# Patient Record
Sex: FEMALE | Race: ASIAN | NOT HISPANIC OR LATINO | URBAN - METROPOLITAN AREA
[De-identification: names, ages, dates, MRNs, and addresses within clinical notes are randomized per-mention and may not be internally consistent; named-entity substitution may affect disease eponyms.]

---

## 2018-05-06 ENCOUNTER — EMERGENCY (EMERGENCY)
Facility: HOSPITAL | Age: 38
LOS: 1 days | Discharge: HOME | End: 2018-05-06
Attending: EMERGENCY MEDICINE | Admitting: EMERGENCY MEDICINE
Payer: COMMERCIAL

## 2018-05-06 VITALS
SYSTOLIC BLOOD PRESSURE: 130 MMHG | RESPIRATION RATE: 18 BRPM | DIASTOLIC BLOOD PRESSURE: 59 MMHG | TEMPERATURE: 98 F | OXYGEN SATURATION: 100 % | HEART RATE: 69 BPM

## 2018-05-06 PROCEDURE — 99283 EMERGENCY DEPT VISIT LOW MDM: CPT

## 2018-05-06 PROCEDURE — 73090 X-RAY EXAM OF FOREARM: CPT | Mod: 26,RT

## 2018-05-06 RX ORDER — KETOROLAC TROMETHAMINE 30 MG/ML
30 SYRINGE (ML) INJECTION ONCE
Qty: 0 | Refills: 0 | Status: DISCONTINUED | OUTPATIENT
Start: 2018-05-06 | End: 2018-05-06

## 2018-05-06 RX ADMIN — Medication 30 MILLIGRAM(S): at 18:50

## 2018-05-06 NOTE — ED PROVIDER NOTE - OBJECTIVE STATEMENT
37 yo with mechanical fall at home onto her R FA.  No LOC no other injuries.  Pain to the lateral R mid FA.  No deformity and good distal PMS.

## 2018-11-21 ENCOUNTER — EMERGENCY (EMERGENCY)
Facility: HOSPITAL | Age: 38
LOS: 1 days | Discharge: HOME | End: 2018-11-21
Attending: EMERGENCY MEDICINE | Admitting: EMERGENCY MEDICINE
Payer: COMMERCIAL

## 2018-11-21 VITALS
DIASTOLIC BLOOD PRESSURE: 92 MMHG | TEMPERATURE: 98 F | HEART RATE: 80 BPM | OXYGEN SATURATION: 97 % | RESPIRATION RATE: 17 BRPM | SYSTOLIC BLOOD PRESSURE: 146 MMHG

## 2018-11-21 VITALS
DIASTOLIC BLOOD PRESSURE: 85 MMHG | OXYGEN SATURATION: 100 % | SYSTOLIC BLOOD PRESSURE: 147 MMHG | RESPIRATION RATE: 16 BRPM | HEART RATE: 68 BPM

## 2018-11-21 PROCEDURE — 99284 EMERGENCY DEPT VISIT MOD MDM: CPT | Mod: 25

## 2018-11-21 RX ORDER — LIDOCAINE 4 G/100G
1 CREAM TOPICAL ONCE
Qty: 0 | Refills: 0 | Status: COMPLETED | OUTPATIENT
Start: 2018-11-21 | End: 2018-11-21

## 2018-11-21 RX ORDER — DIAZEPAM 5 MG
2 TABLET ORAL ONCE
Qty: 0 | Refills: 0 | Status: DISCONTINUED | OUTPATIENT
Start: 2018-11-21 | End: 2018-11-21

## 2018-11-21 RX ORDER — OXYCODONE AND ACETAMINOPHEN 5; 325 MG/1; MG/1
1 TABLET ORAL ONCE
Qty: 0 | Refills: 0 | Status: DISCONTINUED | OUTPATIENT
Start: 2018-11-21 | End: 2018-11-21

## 2018-11-21 RX ORDER — MORPHINE SULFATE 50 MG/1
4 CAPSULE, EXTENDED RELEASE ORAL ONCE
Qty: 0 | Refills: 0 | Status: DISCONTINUED | OUTPATIENT
Start: 2018-11-21 | End: 2018-11-21

## 2018-11-21 RX ORDER — KETOROLAC TROMETHAMINE 30 MG/ML
10 SYRINGE (ML) INJECTION ONCE
Qty: 0 | Refills: 0 | Status: DISCONTINUED | OUTPATIENT
Start: 2018-11-21 | End: 2018-11-21

## 2018-11-21 RX ADMIN — Medication 2 MILLIGRAM(S): at 07:25

## 2018-11-21 RX ADMIN — LIDOCAINE 1 PATCH: 4 CREAM TOPICAL at 07:21

## 2018-11-21 RX ADMIN — Medication 10 MILLIGRAM(S): at 07:19

## 2018-11-21 RX ADMIN — Medication 125 MILLIGRAM(S): at 08:59

## 2018-11-21 RX ADMIN — MORPHINE SULFATE 4 MILLIGRAM(S): 50 CAPSULE, EXTENDED RELEASE ORAL at 08:59

## 2018-11-21 NOTE — ED PROVIDER NOTE - MEDICAL DECISION MAKING DETAILS
37 yo F pt w/PMHx HLD presenting w/lower back pain after mechanical injury; found to have bulging disc. Will provide with pain control for now.

## 2018-11-21 NOTE — ED ADULT NURSE NOTE - NSIMPLEMENTINTERV_GEN_ALL_ED
Implemented All Universal Safety Interventions:  [place] to call system. Call bell, personal items and telephone within reach. Instruct patient to call for assistance. Room bathroom lighting operational. Non-slip footwear when patient is off stretcher. Physically safe environment: no spills, clutter or unnecessary equipment. Stretcher in lowest position, wheels locked, appropriate side rails in place.

## 2018-11-21 NOTE — ED PROVIDER NOTE - FAMILY HISTORY
Mother  Still living? Unknown  Family history of hypertension, Age at diagnosis: Age Unknown  Family history of hyperlipidemia, Age at diagnosis: Age Unknown     Father  Still living? Unknown  Family history of hyperlipidemia, Age at diagnosis: Age Unknown

## 2018-11-21 NOTE — ED PROVIDER NOTE - NSFOLLOWUPINSTRUCTIONS_ED_ALL_ED_FT
[facility]  Please take all medication as prescribed. Please do not drive a motor vehicle while taking percocet as it can cause dizziness and drowsiness.  Please use heat on affected area 3-4 times per day.

## 2018-11-21 NOTE — ED ADULT NURSE NOTE - OBJECTIVE STATEMENT
Received pt to rm 19 sitting on stretcher [employer] with Sister. Pt calm pleasant affect. Report severe back pain since bending over at work and lifting a tote off floow. pt st" I was seen at another ED they did a Ct scan told I have a bulging/hernieated disc. they gave me meds and told me to follow up with MRI but the meds not helping and I can barely walk...pain so bad I have a numbness in left leg. No incontinence." Pt postioned for comfort. asst on bedban . As per Resident Cayetano no labs will medicate and reassess. Meds given will reassess .

## 2018-11-21 NOTE — ED PROVIDER NOTE - OBJECTIVE STATEMENT
39 yo F pt w/PMHx HLD presenting w/lower back pain. 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 Went to ED at [place], where CT spine revealed L4/L5 and L5/S1 annular disc bulges. Pt was DC'd on Mobic 500 q6hrs and ibuprofen 600. Reports worsening lower back pain so that she cannot ambulate and shooting pain to left leg. Denies headaches, F/C, dizziness, syncope, CP/SOB, N/V, abdominal pain, dysuria, changes in BM, peripheral swelling, skin changes. 37 yo F pt w/PMHx HLD presenting w/lower back pain. 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 Went to ED at [place], where CT spine revealed L4/L5 and L5/S1 annular disc bulges. Pt was DC'd on Mobic q6hrs and ibuprofen 600. Reports worsening lower back pain so that she cannot ambulate and shooting pain to left leg. Denies headaches, F/C, dizziness, syncope, CP/SOB, N/V, abdominal pain, dysuria, changes in BM, peripheral swelling, skin changes.

## 2018-11-21 NOTE — ED ADULT TRIAGE NOTE - CHIEF COMPLAINT QUOTE
pt. BIBA for mid lower back pain radiating to left leg x 2 days worsened when she moves. as per EMT, pt. was dx with herniated disc last Monday and was prescribed with Robaxin and Motrin but no relief. denies any numbness/weakness.

## 2018-11-21 NOTE — ED PROVIDER NOTE - ATTENDING CONTRIBUTION TO CARE
MD Hughes:  I performed a face to face bedside interview with patient regarding history of present illness, review of symptoms and past medical history. I completed an independent physical exam(documented below).  I have discussed patient's plan of care with resident.   I agree with note as stated above, having amended the EMR as needed to reflect my findings. I have discussed the assessment and plan of care.  This includes during the time I functioned as the attending physician for this patient.  PE:  Gen: Alert, obese, mild distress  Head: NC, AT,  EOMI, normal lids/conjunctiva  ENT:  normal hearing, patent oropharynx without erythema/exudate  Neck: +supple, no tenderness/meningismus/JVD, +Trachea midline  Chest: no chest wall tenderness, equal chest rise  Pulm: Bilateral BS, normal resp effort, no wheeze/stridor/retractions  CV: RRR, no M/R/G, +dist pulses  Abd: +BS, soft, NT/ND  Rectal: deferred  Mskel: no edema/erythema/cyanosis  Back: +Left lumbar paraspinal ttp >midline lumbar ttp > Right lumbar paraspinal ttp; +b/l straightleg test  Skin: no rash  Neuro: AAOx3, no sensory/motor deficits, CN 2-12 intact   MDM:   39yo F pmh of obesity, hld, lower back injury in March (heavy lifting at work), c/o acute on chronic lower back pain, rads to b/l LEs, X3 days. States this episode of pain started Monday immediately after lifting heavy bag at work, constant since, worsened with any kind of movement, having difficulty ambulating, was seen at OSH and had CT lumbar spine showing L4/L5 and L5/S1 disc bulging, sent home on ibuprofen/mobic/robaxin without significant relief. Denies f/c/n/v/urinary symptoms or hx of renal stones. Denies perineal paresthesias, LE weakness, urinary retention, or bowel/bladder incontinence. Do not suspect cauda equina. No indication for further imaging at this time. Pain mngmt, ambulatory trial, outpt orthospine vs PM&R f/u.

## 2019-05-02 PROBLEM — E78.5 HYPERLIPIDEMIA, UNSPECIFIED: Chronic | Status: ACTIVE | Noted: 2018-11-21

## 2019-05-20 PROBLEM — Z00.00 ENCOUNTER FOR PREVENTIVE HEALTH EXAMINATION: Status: ACTIVE | Noted: 2019-05-20

## 2019-06-19 ENCOUNTER — APPOINTMENT (OUTPATIENT)
Dept: OBGYN | Facility: CLINIC | Age: 39
End: 2019-06-19
Payer: MEDICAID

## 2019-06-19 VITALS
HEART RATE: 67 BPM | BODY MASS INDEX: 36.15 KG/M2 | WEIGHT: 217 LBS | HEIGHT: 65 IN | SYSTOLIC BLOOD PRESSURE: 127 MMHG | DIASTOLIC BLOOD PRESSURE: 92 MMHG

## 2019-06-19 DIAGNOSIS — Z78.9 OTHER SPECIFIED HEALTH STATUS: ICD-10-CM

## 2019-06-19 DIAGNOSIS — Z83.3 FAMILY HISTORY OF DIABETES MELLITUS: ICD-10-CM

## 2019-06-19 DIAGNOSIS — Z82.5 FAMILY HISTORY OF ASTHMA AND OTHER CHRONIC LOWER RESPIRATORY DISEASES: ICD-10-CM

## 2019-06-19 DIAGNOSIS — Z82.49 FAMILY HISTORY OF ISCHEMIC HEART DISEASE AND OTHER DISEASES OF THE CIRCULATORY SYSTEM: ICD-10-CM

## 2019-06-19 DIAGNOSIS — N83.209 UNSPECIFIED OVARIAN CYST, UNSPECIFIED SIDE: ICD-10-CM

## 2019-06-19 PROCEDURE — 99214 OFFICE O/P EST MOD 30 MIN: CPT | Mod: 25

## 2019-06-19 PROCEDURE — 99385 PREV VISIT NEW AGE 18-39: CPT

## 2019-06-19 NOTE — COUNSELING
[Breast Self Exam] : breast self exam [Nutrition] : nutrition [Exercise] : exercise [Other ___] : [unfilled] [Vitamins/Supplements] : vitamins/supplements

## 2019-06-20 NOTE — PHYSICAL EXAM
[Awake] : awake [Alert] : alert [Oriented x3] : oriented to person, place, and time [Soft] : soft [Normal] : uterus [Uterine Adnexae] : were not tender and not enlarged [No Bleeding] : there was no active vaginal bleeding [Mass] : no breast mass [Acute Distress] : no acute distress [Nipple Discharge] : no nipple discharge [Axillary LAD] : no axillary lymphadenopathy [Tender] : non tender

## 2019-06-24 PROBLEM — R73.03 PRE-DIABETES: Status: ACTIVE | Noted: 2019-06-24

## 2019-06-25 ENCOUNTER — APPOINTMENT (OUTPATIENT)
Dept: OBGYN | Facility: CLINIC | Age: 39
End: 2019-06-25
Payer: MEDICAID

## 2019-06-25 ENCOUNTER — ASOB RESULT (OUTPATIENT)
Age: 39
End: 2019-06-25

## 2019-06-25 VITALS
HEART RATE: 90 BPM | WEIGHT: 217 LBS | BODY MASS INDEX: 36.15 KG/M2 | HEIGHT: 65 IN | SYSTOLIC BLOOD PRESSURE: 138 MMHG | DIASTOLIC BLOOD PRESSURE: 79 MMHG

## 2019-06-25 DIAGNOSIS — R73.03 PREDIABETES.: ICD-10-CM

## 2019-06-25 DIAGNOSIS — N94.9 UNSPECIFIED CONDITION ASSOCIATED WITH FEMALE GENITAL ORGANS AND MENSTRUAL CYCLE: ICD-10-CM

## 2019-06-25 DIAGNOSIS — N91.2 AMENORRHEA, UNSPECIFIED: ICD-10-CM

## 2019-06-25 LAB
17OHP SERPL-MCNC: 26 NG/DL
C TRACH RRNA SPEC QL NAA+PROBE: NOT DETECTED
CANDIDA VAG CYTO: NOT DETECTED
CYTOLOGY CVX/VAG DOC THIN PREP: NORMAL
ESTIMATED AVERAGE GLUCOSE: 117 MG/DL
ESTRADIOL SERPL-MCNC: 87 PG/ML
FSH SERPL-MCNC: 6 IU/L
G VAGINALIS+PREV SP MTYP VAG QL MICRO: NOT DETECTED
HBA1C MFR BLD HPLC: 5.7 %
HCG SERPL-MCNC: <1 MIU/ML
HPV HIGH+LOW RISK DNA PNL CVX: NOT DETECTED
LH SERPL-ACNC: 9.9 IU/L
N GONORRHOEA RRNA SPEC QL NAA+PROBE: NOT DETECTED
PROLACTIN SERPL-MCNC: 14.6 NG/ML
SOURCE AMPLIFICATION: NORMAL
T VAGINALIS VAG QL WET PREP: NOT DETECTED
TESTOST BND SERPL-MCNC: 0.5 PG/ML
TESTOST SERPL-MCNC: 39 NG/DL
TSH SERPL-ACNC: 2.13 UIU/ML

## 2019-06-25 PROCEDURE — 99214 OFFICE O/P EST MOD 30 MIN: CPT | Mod: 25

## 2019-06-25 PROCEDURE — 58100 BIOPSY OF UTERUS LINING: CPT | Mod: 53

## 2019-06-25 NOTE — PROCEDURE
[Endometrial Biopsy] : Endometrial biopsy [Abnormal US] : abnormal ultrasound findings [Risks] : risks [Benefits] : benefits [Alternatives] : alternatives [Infection] : infection [Patient] : patient [Bleeding] : bleeding [Allergic Reaction] : allergic reaction [Uterine Perforation] : uterine perforation [Pain] : pain [CONSENT OBTAINED] : written consent was obtained prior to the procedure. [Neg Pregnancy Test] : a pregnancy test was negative [No Premedication] : No premedication [None] : none [Without Epi] : without epinephrine [1%] : 1% [Tenaculum] : a single toothed tenaculum [Tolerated Well] : the patient tolerated the procedure well [No Complications] : there were no complications [de-identified] : could not enter Em Cavity [de-identified] : procedure aborted. Sched D&C hysteroscopy

## 2019-07-19 ENCOUNTER — OUTPATIENT (OUTPATIENT)
Dept: OUTPATIENT SERVICES | Facility: HOSPITAL | Age: 39
LOS: 1 days | End: 2019-07-19

## 2019-07-19 VITALS
RESPIRATION RATE: 16 BRPM | SYSTOLIC BLOOD PRESSURE: 124 MMHG | WEIGHT: 214.95 LBS | HEIGHT: 64 IN | TEMPERATURE: 98 F | DIASTOLIC BLOOD PRESSURE: 72 MMHG | HEART RATE: 68 BPM | OXYGEN SATURATION: 98 %

## 2019-07-19 DIAGNOSIS — N91.2 AMENORRHEA, UNSPECIFIED: ICD-10-CM

## 2019-07-19 DIAGNOSIS — Z87.42 PERSONAL HISTORY OF OTHER DISEASES OF THE FEMALE GENITAL TRACT: ICD-10-CM

## 2019-07-19 LAB
HCG SERPL-ACNC: < 5 MIU/ML — SIGNIFICANT CHANGE UP
HCT VFR BLD CALC: 38.9 % — SIGNIFICANT CHANGE UP (ref 34.5–45)
HGB BLD-MCNC: 12.4 G/DL — SIGNIFICANT CHANGE UP (ref 11.5–15.5)
MCHC RBC-ENTMCNC: 27.4 PG — SIGNIFICANT CHANGE UP (ref 27–34)
MCHC RBC-ENTMCNC: 31.9 % — LOW (ref 32–36)
MCV RBC AUTO: 85.9 FL — SIGNIFICANT CHANGE UP (ref 80–100)
NRBC # FLD: 0 K/UL — SIGNIFICANT CHANGE UP (ref 0–0)
PLATELET # BLD AUTO: 367 K/UL — SIGNIFICANT CHANGE UP (ref 150–400)
PMV BLD: 10.7 FL — SIGNIFICANT CHANGE UP (ref 7–13)
RBC # BLD: 4.53 M/UL — SIGNIFICANT CHANGE UP (ref 3.8–5.2)
RBC # FLD: 13.1 % — SIGNIFICANT CHANGE UP (ref 10.3–14.5)
WBC # BLD: 10.67 K/UL — HIGH (ref 3.8–10.5)
WBC # FLD AUTO: 10.67 K/UL — HIGH (ref 3.8–10.5)

## 2019-07-19 RX ORDER — SODIUM CHLORIDE 9 MG/ML
1000 INJECTION, SOLUTION INTRAVENOUS
Refills: 0 | Status: DISCONTINUED | OUTPATIENT
Start: 2019-07-23 | End: 2019-08-09

## 2019-07-19 NOTE — H&P PST ADULT - NEGATIVE ENMT SYMPTOMS
no ear pain/no vertigo/no throat pain/no dysphagia/no hearing difficulty/no nose bleeds/no tinnitus/no sinus symptoms

## 2019-07-19 NOTE — H&P PST ADULT - NEGATIVE CARDIOVASCULAR SYMPTOMS
no dyspnea on exertion/no palpitations/no chest pain/no paroxysmal nocturnal dyspnea/no peripheral edema

## 2019-07-19 NOTE — H&P PST ADULT - NEGATIVE OPHTHALMOLOGIC SYMPTOMS
no pain L/no loss of vision R/no blurred vision L/no blurred vision R/no diplopia/no pain R/no loss of vision L/no photophobia

## 2019-07-19 NOTE — H&P PST ADULT - RS GEN PE MLT RESP DETAILS PC
breath sounds equal/good air movement/no rhonchi/no wheezes/respirations non-labored/airway patent/clear to auscultation bilaterally/no rales

## 2019-07-19 NOTE — H&P PST ADULT - NEGATIVE NEUROLOGICAL SYMPTOMS
no syncope/no transient paralysis/no weakness/no paresthesias/no difficulty walking/no generalized seizures/no headache/no tremors

## 2019-07-19 NOTE — H&P PST ADULT - HISTORY OF PRESENT ILLNESS
39 year old female presents to presurgical testing with diagnosis of amenorrhea, unspecified scheduled for dilation curettage hysteroscopy for 7/23/19. Pt states she is trying to conceive and has irregular menses, last one 3 months ago. 39 year old female G0 presents to presurgical testing with diagnosis of amenorrhea, unspecified scheduled for dilation curettage hysteroscopy for 7/23/19. Pt states she is trying to conceive and has irregular menses, last one 3 months ago.

## 2019-07-19 NOTE — H&P PST ADULT - NSICDXFAMILYHX_GEN_ALL_CORE_FT
FAMILY HISTORY:  Father  Still living? Unknown  Family history of hyperlipidemia, Age at diagnosis: Age Unknown    Mother  Still living? Unknown  Family history of hyperlipidemia, Age at diagnosis: Age Unknown  Family history of hypertension, Age at diagnosis: Age Unknown

## 2019-07-19 NOTE — H&P PST ADULT - NSICDXPROBLEM_GEN_ALL_CORE_FT
PROBLEM DIAGNOSES  Problem: H/O amenorrhea  Assessment and Plan: Pt is scheduled for dilation curettage hysteroscopy for 7/23/19. Pre-op instructions provided. Pt given verbal and written instructions with teach back on pepcid. Urine cup provided for day of procedure pregnancy testing. Pt verbalized understanding with return demonstration.

## 2019-07-22 ENCOUNTER — TRANSCRIPTION ENCOUNTER (OUTPATIENT)
Age: 39
End: 2019-07-22

## 2019-07-23 ENCOUNTER — RESULT REVIEW (OUTPATIENT)
Age: 39
End: 2019-07-23

## 2019-07-23 ENCOUNTER — APPOINTMENT (OUTPATIENT)
Dept: OBGYN | Facility: HOSPITAL | Age: 39
End: 2019-07-23

## 2019-07-23 ENCOUNTER — OUTPATIENT (OUTPATIENT)
Dept: OUTPATIENT SERVICES | Facility: HOSPITAL | Age: 39
LOS: 1 days | Discharge: HOME | End: 2019-07-23
Payer: COMMERCIAL

## 2019-07-23 VITALS
RESPIRATION RATE: 15 BRPM | HEIGHT: 64 IN | SYSTOLIC BLOOD PRESSURE: 129 MMHG | WEIGHT: 214.95 LBS | OXYGEN SATURATION: 99 % | DIASTOLIC BLOOD PRESSURE: 90 MMHG | TEMPERATURE: 99 F | HEART RATE: 72 BPM

## 2019-07-23 VITALS
DIASTOLIC BLOOD PRESSURE: 72 MMHG | RESPIRATION RATE: 16 BRPM | SYSTOLIC BLOOD PRESSURE: 110 MMHG | OXYGEN SATURATION: 98 % | HEART RATE: 72 BPM

## 2019-07-23 DIAGNOSIS — N91.2 AMENORRHEA, UNSPECIFIED: ICD-10-CM

## 2019-07-23 LAB — HCG UR QL: NEGATIVE — SIGNIFICANT CHANGE UP

## 2019-07-23 PROCEDURE — 58558 HYSTEROSCOPY BIOPSY: CPT

## 2019-07-23 PROCEDURE — 88305 TISSUE EXAM BY PATHOLOGIST: CPT | Mod: 26

## 2019-07-23 RX ORDER — HYDROMORPHONE HYDROCHLORIDE 2 MG/ML
0.5 INJECTION INTRAMUSCULAR; INTRAVENOUS; SUBCUTANEOUS
Refills: 0 | Status: DISCONTINUED | OUTPATIENT
Start: 2019-07-23 | End: 2019-07-23

## 2019-07-23 RX ORDER — ONDANSETRON 8 MG/1
4 TABLET, FILM COATED ORAL ONCE
Refills: 0 | Status: DISCONTINUED | OUTPATIENT
Start: 2019-07-23 | End: 2019-08-09

## 2019-07-23 RX ORDER — FENTANYL CITRATE 50 UG/ML
50 INJECTION INTRAVENOUS
Refills: 0 | Status: DISCONTINUED | OUTPATIENT
Start: 2019-07-23 | End: 2019-07-23

## 2019-07-23 RX ORDER — FENTANYL CITRATE 50 UG/ML
25 INJECTION INTRAVENOUS
Refills: 0 | Status: DISCONTINUED | OUTPATIENT
Start: 2019-07-23 | End: 2019-07-23

## 2019-07-23 RX ADMIN — SODIUM CHLORIDE 30 MILLILITER(S): 9 INJECTION, SOLUTION INTRAVENOUS at 09:45

## 2019-07-23 NOTE — BRIEF OPERATIVE NOTE - OPERATION/FINDINGS
Anterverted uterus  Gross visualization of external os and vaginal canal wnl  Cervical stenosis on hysteroscopy, uterine cavity was unable to visualized.

## 2019-07-23 NOTE — ASU DISCHARGE PLAN (ADULT/PEDIATRIC) - CARE PROVIDER_API CALL
Rach Shaw)  Obstetrics and Gynecology  [facility] [place] [address]  Phone: [phone]  Fax: [phone]  Follow Up Time: 2 weeks

## 2019-07-26 LAB — SURGICAL PATHOLOGY STUDY: SIGNIFICANT CHANGE UP

## 2019-07-29 PROBLEM — E28.2 POLYCYSTIC OVARIAN SYNDROME: Chronic | Status: ACTIVE | Noted: 2019-07-19

## 2019-07-29 PROBLEM — N91.2 AMENORRHEA, UNSPECIFIED: Chronic | Status: ACTIVE | Noted: 2019-07-19

## 2019-08-05 ENCOUNTER — ASOB RESULT (OUTPATIENT)
Age: 39
End: 2019-08-05

## 2019-08-05 ENCOUNTER — APPOINTMENT (OUTPATIENT)
Dept: OBGYN | Facility: CLINIC | Age: 39
End: 2019-08-05
Payer: MEDICAID

## 2019-08-05 VITALS
HEART RATE: 71 BPM | HEIGHT: 65 IN | SYSTOLIC BLOOD PRESSURE: 141 MMHG | WEIGHT: 220 LBS | BODY MASS INDEX: 36.65 KG/M2 | DIASTOLIC BLOOD PRESSURE: 83 MMHG

## 2019-08-05 DIAGNOSIS — N97.9 FEMALE INFERTILITY, UNSPECIFIED: ICD-10-CM

## 2019-08-05 DIAGNOSIS — N92.6 IRREGULAR MENSTRUATION, UNSPECIFIED: ICD-10-CM

## 2019-08-05 DIAGNOSIS — Z01.419 ENCOUNTER FOR GYNECOLOGICAL EXAMINATION (GENERAL) (ROUTINE) W/OUT ABNORMAL FINDINGS: ICD-10-CM

## 2019-08-05 PROCEDURE — 58340 CATHETER FOR HYSTEROGRAPHY: CPT

## 2019-08-05 PROCEDURE — 76831 ECHO EXAM UTERUS: CPT

## 2019-08-12 ENCOUNTER — APPOINTMENT (OUTPATIENT)
Dept: OBGYN | Facility: CLINIC | Age: 39
End: 2019-08-12

## 2019-10-21 NOTE — ASU DISCHARGE PLAN (ADULT/PEDIATRIC) - NURSING INSTRUCTIONS
Pt was admitted to [facility] on 10/16 and discharged on 10/19. Called to f/u and see if she was able to fax financial paperwork to [facility]. No answer. LIDIA.   
You were given Toradol in the OR.  Do not take Motrin or any products containing ibuprofen until 2:15pm

## 2019-10-25 ENCOUNTER — EMERGENCY (EMERGENCY)
Facility: HOSPITAL | Age: 39
LOS: 1 days | Discharge: HOME | End: 2019-10-25
Attending: EMERGENCY MEDICINE | Admitting: EMERGENCY MEDICINE
Payer: COMMERCIAL

## 2019-10-25 VITALS
HEART RATE: 82 BPM | DIASTOLIC BLOOD PRESSURE: 87 MMHG | OXYGEN SATURATION: 100 % | SYSTOLIC BLOOD PRESSURE: 141 MMHG | TEMPERATURE: 98 F

## 2019-10-25 VITALS
DIASTOLIC BLOOD PRESSURE: 60 MMHG | TEMPERATURE: 98 F | HEART RATE: 72 BPM | OXYGEN SATURATION: 96 % | SYSTOLIC BLOOD PRESSURE: 126 MMHG

## 2019-10-25 PROCEDURE — 99283 EMERGENCY DEPT VISIT LOW MDM: CPT

## 2019-10-25 PROCEDURE — 73030 X-RAY EXAM OF SHOULDER: CPT | Mod: 26,LT

## 2019-10-25 RX ORDER — LIDOCAINE 4 G/100G
1 CREAM TOPICAL ONCE
Refills: 0 | Status: COMPLETED | OUTPATIENT
Start: 2019-10-25 | End: 2019-10-25

## 2019-10-25 RX ORDER — DIAZEPAM 5 MG
5 TABLET ORAL ONCE
Refills: 0 | Status: DISCONTINUED | OUTPATIENT
Start: 2019-10-25 | End: 2019-10-25

## 2019-10-25 RX ORDER — IBUPROFEN 200 MG
1 TABLET ORAL
Qty: 15 | Refills: 0
Start: 2019-10-25 | End: 2019-10-29

## 2019-10-25 RX ORDER — IBUPROFEN 200 MG
800 TABLET ORAL ONCE
Refills: 0 | Status: COMPLETED | OUTPATIENT
Start: 2019-10-25 | End: 2019-10-25

## 2019-10-25 RX ORDER — DIAZEPAM 5 MG
1 TABLET ORAL
Qty: 9 | Refills: 0
Start: 2019-10-25 | End: 2019-10-27

## 2019-10-25 RX ADMIN — Medication 5 MILLIGRAM(S): at 19:17

## 2019-10-25 RX ADMIN — Medication 800 MILLIGRAM(S): at 19:17

## 2019-10-25 RX ADMIN — LIDOCAINE 1 PATCH: 4 CREAM TOPICAL at 19:18

## 2019-10-25 NOTE — ED PROVIDER NOTE - NSFOLLOWUPINSTRUCTIONS_ED_ALL_ED_FT
Follow up with Ortho within the week- referral list provided.  Take Ibuprofen 600mg 1 tab every 6-8 hrs as needed with food for pain. Take Valium 5mg tab every 8 hrs as needed for muscle spasm/severe pain- caution drowsiness, do not drive or operate heavy machinery.  Worsening pain, numbness, weakness, swelling or new concerning symptoms return to the Emergency Department.

## 2019-10-25 NOTE — ED PROVIDER NOTE - CLINICAL SUMMARY MEDICAL DECISION MAKING FREE TEXT BOX
Pt p/w atraumatic left shoulder pain, limited active ROM. Will obtain XR, provide Sx Tx and reassess.

## 2019-10-25 NOTE — ED PROVIDER NOTE - OBJECTIVE STATEMENT
39 yr old F c non contrib PMHx who p.w  L shoulder pain on ROM.  No inuries.  No skin changes.  No fever/chilllls.  Pain is non radiating and when not moving shoulder has no pain. Pain has limilted her ability to use left arm.

## 2019-10-25 NOTE — ED PROVIDER NOTE - MUSCULOSKELETAL, MLM
Spine appears normal, range of motion is not limited, no muscle or joint tenderness except for mild tenderness at ant aspect of left shoulder.  LIited ROM d/t pain, but able to rage passivley.  Neurovasc intact

## 2019-10-25 NOTE — ED PROVIDER NOTE - PROGRESS NOTE DETAILS
JOSEPH BENJAMIN- received sign out pending xrays. xray negative. pt pain well controlled. advised ortho f/u. no swelling, nor bruising noted. no dvt/pe risk factors.

## 2019-10-25 NOTE — ED PROVIDER NOTE - ATTENDING CONTRIBUTION TO CARE
ED Attending - Dr. Santamaria:  I performed the initial face to face bedside interview with this patient regarding history of present illness, review of symptoms and past medical, social and family history.  I completed an independent physical examination.  I was the initial provider who evaluated this patient. I have signed out the follow up of any pending tests (i.e. labs, radiological studies) to the PA and have discussed the patient’s plan of care and disposition with the PA. Pt p/w atraumatic left shoulder pain, limited active ROM. Will obtain XR, provide Sx Tx and reassess.

## 2019-10-25 NOTE — ED PROVIDER NOTE - PATIENT PORTAL LINK FT
You can access the FollowMyHealth Patient Portal offered by [facility] by registering at the following website: [email or web address]. By joining [employer]’s FollowMyHealth portal, you will also be able to view your health information using other applications (apps) compatible with our system.

## 2019-11-20 NOTE — ED ADULT NURSE REASSESSMENT NOTE - PAIN ACTIVITY
"Subjective   Teodora Whitman is a 49 y.o. female.   Cc: follow up of chronic medical issues  HPI The patient comes in for check of their chronic medical issues which include Hypertension,GERD and Developmental delay. She  Is at her base line. She needs refill of her Diazepam..      The following portions of the patient's history were reviewed and updated as appropriate: allergies, current medications, past family history, past medical history, past social history, past surgical history and problem list.    Review of Systems   Constitutional: Negative for fatigue and fever.   Respiratory: Negative for cough, chest tightness and stridor.    Cardiovascular: Negative for chest pain, palpitations and leg swelling.       Objective   Physical Exam   Constitutional: She is oriented to person, place, and time. She appears well-developed and well-nourished.   HENT:   Head: Normocephalic and atraumatic.   Right Ear: External ear normal.   Left Ear: External ear normal.   Nose: Nose normal.   Mouth/Throat: Oropharynx is clear and moist.   Eyes: Conjunctivae are normal.   Neck: Normal range of motion.   Cardiovascular: Normal rate, regular rhythm and normal heart sounds. Exam reveals no gallop and no friction rub.   No murmur heard.  Pulmonary/Chest: Effort normal and breath sounds normal. No stridor. No respiratory distress. She has no wheezes.   Abdominal: Soft. Bowel sounds are normal. She exhibits no distension. There is no tenderness. There is no guarding.   Neurological: She is alert and oriented to person, place, and time.   Skin: Skin is warm and dry.   Vitals reviewed.        Visit Vitals  /74   Pulse 115   Ht 170.2 cm (67\")   Wt 101 kg (222 lb 9 oz)   LMP  (LMP Unknown)   SpO2 96%   Breastfeeding? No   BMI 34.86 kg/m²     Body mass index is 34.86 kg/m².      Assessment/Plan   Teodora was seen today for follow-up.    Diagnoses and all orders for this visit:    Gastroesophageal reflux disease without " esophagitis    Unspecified intellectual disabilities  -     diazePAM (VALIUM) 5 MG tablet; Take 1 tablet by mouth every night at bedtime.    Essential hypertension    Other orders  -     famotidine (PEPCID) 20 MG tablet; Take 1 tablet by mouth 2 (Two) Times a Day.    Return to the clinic in 3 month/s.  Will contact with results as needed.  [id number] is appropriate.   10

## 2020-01-20 NOTE — H&P PST ADULT - SPO2 (%)
Quality 130: Documentation Of Current Medications In The Medical Record: Current Medications Documented Quality 402: Tobacco Use And Help With Quitting Among Adolescents: Patient screened for tobacco and never smoked Detail Level: Detailed 98

## 2020-02-12 ENCOUNTER — EMERGENCY (EMERGENCY)
Facility: HOSPITAL | Age: 40
LOS: 1 days | Discharge: HOME | End: 2020-02-12
Attending: EMERGENCY MEDICINE | Admitting: EMERGENCY MEDICINE
Payer: COMMERCIAL

## 2020-02-12 VITALS
RESPIRATION RATE: 16 BRPM | DIASTOLIC BLOOD PRESSURE: 66 MMHG | SYSTOLIC BLOOD PRESSURE: 129 MMHG | TEMPERATURE: 98 F | HEART RATE: 78 BPM | OXYGEN SATURATION: 100 %

## 2020-02-12 VITALS
RESPIRATION RATE: 17 BRPM | OXYGEN SATURATION: 100 % | TEMPERATURE: 99 F | DIASTOLIC BLOOD PRESSURE: 78 MMHG | HEART RATE: 75 BPM | SYSTOLIC BLOOD PRESSURE: 143 MMHG

## 2020-02-12 DIAGNOSIS — R10.32 LEFT LOWER QUADRANT PAIN: ICD-10-CM

## 2020-02-12 LAB
ALBUMIN SERPL ELPH-MCNC: 4 G/DL — SIGNIFICANT CHANGE UP (ref 3.3–5)
ALP SERPL-CCNC: 105 U/L — SIGNIFICANT CHANGE UP (ref 40–120)
ALT FLD-CCNC: 22 U/L — SIGNIFICANT CHANGE UP (ref 4–33)
ANION GAP SERPL CALC-SCNC: 11 MMO/L — SIGNIFICANT CHANGE UP (ref 7–14)
APPEARANCE UR: CLEAR — SIGNIFICANT CHANGE UP
AST SERPL-CCNC: 20 U/L — SIGNIFICANT CHANGE UP (ref 4–32)
BACTERIA # UR AUTO: NEGATIVE — SIGNIFICANT CHANGE UP
BASOPHILS # BLD AUTO: 0.07 K/UL — SIGNIFICANT CHANGE UP (ref 0–0.2)
BASOPHILS NFR BLD AUTO: 0.6 % — SIGNIFICANT CHANGE UP (ref 0–2)
BILIRUB SERPL-MCNC: 0.3 MG/DL — SIGNIFICANT CHANGE UP (ref 0.2–1.2)
BILIRUB UR-MCNC: NEGATIVE — SIGNIFICANT CHANGE UP
BLOOD UR QL VISUAL: SIGNIFICANT CHANGE UP
BUN SERPL-MCNC: 12 MG/DL — SIGNIFICANT CHANGE UP (ref 7–23)
CALCIUM SERPL-MCNC: 9.2 MG/DL — SIGNIFICANT CHANGE UP (ref 8.4–10.5)
CHLORIDE SERPL-SCNC: 106 MMOL/L — SIGNIFICANT CHANGE UP (ref 98–107)
CO2 SERPL-SCNC: 22 MMOL/L — SIGNIFICANT CHANGE UP (ref 22–31)
COLOR SPEC: YELLOW — SIGNIFICANT CHANGE UP
CREAT SERPL-MCNC: 0.65 MG/DL — SIGNIFICANT CHANGE UP (ref 0.5–1.3)
EOSINOPHIL # BLD AUTO: 0.21 K/UL — SIGNIFICANT CHANGE UP (ref 0–0.5)
EOSINOPHIL NFR BLD AUTO: 1.9 % — SIGNIFICANT CHANGE UP (ref 0–6)
GLUCOSE SERPL-MCNC: 101 MG/DL — HIGH (ref 70–99)
GLUCOSE UR-MCNC: NEGATIVE — SIGNIFICANT CHANGE UP
HCT VFR BLD CALC: 40.1 % — SIGNIFICANT CHANGE UP (ref 34.5–45)
HGB BLD-MCNC: 12.8 G/DL — SIGNIFICANT CHANGE UP (ref 11.5–15.5)
HYALINE CASTS # UR AUTO: NEGATIVE — SIGNIFICANT CHANGE UP
IMM GRANULOCYTES NFR BLD AUTO: 0.5 % — SIGNIFICANT CHANGE UP (ref 0–1.5)
KETONES UR-MCNC: NEGATIVE — SIGNIFICANT CHANGE UP
LEUKOCYTE ESTERASE UR-ACNC: NEGATIVE — SIGNIFICANT CHANGE UP
LYMPHOCYTES # BLD AUTO: 2.4 K/UL — SIGNIFICANT CHANGE UP (ref 1–3.3)
LYMPHOCYTES # BLD AUTO: 22.3 % — SIGNIFICANT CHANGE UP (ref 13–44)
MCHC RBC-ENTMCNC: 27.5 PG — SIGNIFICANT CHANGE UP (ref 27–34)
MCHC RBC-ENTMCNC: 31.9 % — LOW (ref 32–36)
MCV RBC AUTO: 86.1 FL — SIGNIFICANT CHANGE UP (ref 80–100)
MONOCYTES # BLD AUTO: 0.7 K/UL — SIGNIFICANT CHANGE UP (ref 0–0.9)
MONOCYTES NFR BLD AUTO: 6.5 % — SIGNIFICANT CHANGE UP (ref 2–14)
NEUTROPHILS # BLD AUTO: 7.34 K/UL — SIGNIFICANT CHANGE UP (ref 1.8–7.4)
NEUTROPHILS NFR BLD AUTO: 68.2 % — SIGNIFICANT CHANGE UP (ref 43–77)
NITRITE UR-MCNC: NEGATIVE — SIGNIFICANT CHANGE UP
NRBC # FLD: 0 K/UL — SIGNIFICANT CHANGE UP (ref 0–0)
PH UR: 6 — SIGNIFICANT CHANGE UP (ref 5–8)
PLATELET # BLD AUTO: 355 K/UL — SIGNIFICANT CHANGE UP (ref 150–400)
PMV BLD: 10.5 FL — SIGNIFICANT CHANGE UP (ref 7–13)
POTASSIUM SERPL-MCNC: 3.8 MMOL/L — SIGNIFICANT CHANGE UP (ref 3.5–5.3)
POTASSIUM SERPL-SCNC: 3.8 MMOL/L — SIGNIFICANT CHANGE UP (ref 3.5–5.3)
PROT SERPL-MCNC: 7.5 G/DL — SIGNIFICANT CHANGE UP (ref 6–8.3)
PROT UR-MCNC: 20 — SIGNIFICANT CHANGE UP
RBC # BLD: 4.66 M/UL — SIGNIFICANT CHANGE UP (ref 3.8–5.2)
RBC # FLD: 13.2 % — SIGNIFICANT CHANGE UP (ref 10.3–14.5)
RBC CASTS # UR COMP ASSIST: SIGNIFICANT CHANGE UP (ref 0–?)
SODIUM SERPL-SCNC: 139 MMOL/L — SIGNIFICANT CHANGE UP (ref 135–145)
SP GR SPEC: 1.03 — SIGNIFICANT CHANGE UP (ref 1–1.04)
SQUAMOUS # UR AUTO: SIGNIFICANT CHANGE UP
UROBILINOGEN FLD QL: NORMAL — SIGNIFICANT CHANGE UP
WBC # BLD: 10.77 K/UL — HIGH (ref 3.8–10.5)
WBC # FLD AUTO: 10.77 K/UL — HIGH (ref 3.8–10.5)
WBC UR QL: SIGNIFICANT CHANGE UP (ref 0–?)

## 2020-02-12 PROCEDURE — 76830 TRANSVAGINAL US NON-OB: CPT | Mod: 26

## 2020-02-12 PROCEDURE — 74177 CT ABD & PELVIS W/CONTRAST: CPT | Mod: 26

## 2020-02-12 PROCEDURE — 99213 OFFICE O/P EST LOW 20 MIN: CPT

## 2020-02-12 PROCEDURE — 99285 EMERGENCY DEPT VISIT HI MDM: CPT

## 2020-02-12 RX ORDER — ACETAMINOPHEN 500 MG
650 TABLET ORAL ONCE
Refills: 0 | Status: COMPLETED | OUTPATIENT
Start: 2020-02-12 | End: 2020-02-12

## 2020-02-12 RX ADMIN — Medication 650 MILLIGRAM(S): at 10:57

## 2020-02-12 NOTE — ED ADULT TRIAGE NOTE - CHIEF COMPLAINT QUOTE
states" I am having lower abdominal pain started since 1 week and is getting worst, with diarrhea started since this morning .

## 2020-02-12 NOTE — ED PROVIDER NOTE - ATTENDING CONTRIBUTION TO CARE
I performed the initial face to face rapid assessment bedside interview with this patient regarding history of present illness, review of symptoms and past medical history.  I completed an independent physical examination.  I was the initial provider who evaluated this patient.  The history, review of symptoms and examination was documented by the scribe in my presence and I attest to the accuracy of the documentation.  I have signed out the follow up of any pending tests (i.e. labs, radiological studies) to the PA.  I have discussed the patient’s plan of care and disposition with the PA and dr. rascon I performed the initial face to face rapid assessment bedside interview with this patient regarding history of present illness, review of symptoms and past medical history.  I completed an independent physical examination.  I was the initial provider who evaluated this patient.   I have signed out the follow up of any pending tests (i.e. labs, radiological studies) to the PA.  I have discussed the patient’s plan of care and disposition with the JOSEPH Koo and dr. rascon

## 2020-02-12 NOTE — CONSULT NOTE ADULT - ASSESSMENT
38 yo G0 LMP 1/15 with PMHx of PCOS and HLD p/w abdominal pain and diarrhea found to have bl hemorrhagic cyst and left sided hydrosalpinx on TVUS.

## 2020-02-12 NOTE — ED PROVIDER NOTE - NEUROLOGICAL, MLM
Detail Level: Simple Photo Preface (Leave Blank If You Do Not Want): Photographs were obtained today Declines Alert and oriented, no focal deficits, no motor or sensory deficits.

## 2020-02-12 NOTE — ED PROVIDER NOTE - RAPID ASSESSMENT
MD Fleming: I briefly evaluated the patient in quick look triage as per [facility] ED procedure.   I have placed initial orders to help expedite the care.  I have handed off the care of this patient to the accepting ED provider team and signed out my initial brief assessment and orders to the team. The patient is to be fully evaluated by the intake providers and have orders confirmed/changed/added to as they see fit.  38 y/o f presents to ed with lower abd pain. Has been intermittent for 1 week, became more constant few days ago, across lower abd L>>R. Today had episode of loose nonbloody stools. Has h/o pcos monitored by us every year. Denies fevers, chills, ha, cp, sob, vomiting, dysuria, hematuria. On exam, patient well appearing, vss. A & O x 3, NAD, HEENT WNL and no facial asymmetry; lungs CTAB, heart with reg rhythm without murmur; abdomen soft minimal tenderness to low LLQ; extremities with no edema; neuro exam non focal with no motor or sensory deficits. CBC/CMP/UA/transvaginal US ordered with tylenol for pain. Patient to have full exam including pelvic exam by ED team. S/o to JOSEPH Koo.

## 2020-02-12 NOTE — CONSULT NOTE ADULT - SUBJECTIVE AND OBJECTIVE BOX
R2 GYN CONSULT NOTE    Note backcharted 2/2 clinical duties. Patient seen at 3pm with Dr. Shaw    HPI: 40 yo with PMHx of PCOS and HLD presents to the ER c/o 1 week of left sided pelvic pain and 1 day of diarrhea. She states that the pain was 8/10 prior to arrival to the ED and now it is a 7/10. The pain is not         Name of GYN Physician:     POB:      Pgyn: Denies fibroids, cysts, endometriosis, STI's, Abnormal pap smears     Home meds:     Hospital Meds:   MEDICATIONS  (STANDING):    MEDICATIONS  (PRN):      Allergies    No Known Allergies    Intolerances        PAST MEDICAL & SURGICAL HISTORY:  Amenorrhea  PCOS (polycystic ovarian syndrome)  Hyperlipidemia  No significant past surgical history      FAMILY HISTORY:  Family history of hyperlipidemia (Mother, Father)  Family history of hypertension (Mother)      Social History:  Denies smoking use, drug use, alcohol use.   +occasional social alcohol use    Vital Signs Last 24 Hrs  T(C): 37 (2020 15:32), Max: 37.1 (2020 13:51)  T(F): 98.6 (2020 15:32), Max: 98.7 (2020 13:51)  HR: 73 (2020 15:32) (72 - 78)  BP: 126/65 (2020 15:32) (125/72 - 129/66)  BP(mean): --  RR: 16 (2020 15:32) (16 - 16)  SpO2: 98% (2020 15:32) (98% - 100%)    Physical Exam:   General: sitting comftorably in bed, NAD   HEENT: neck supple, full ROM  CV: RR S1S2 no m/r/g  Lungs: CTA b/l, good air flow b/l   Back: No CVA tenderness  Abd: Soft, non-tender, non-distended.  Bowel sounds present.    :  No bleeding on pad.    External labia wnl.  Bimanual exam with no cervical motion tenderness, uterus wnl, adnexa non palpable b/l.  Cervix closed vs. Cervix dilated    cm   Speculum Exam: No active bleeding from os.  Physiologic discharge.    Ext: non-tender b/l, no edema     LABS:                              12.8   10.77 )-----------( 355      ( 2020 10:45 )             40.1     02-12    139  |  106  |  12  ----------------------------<  101<H>  3.8   |  22  |  0.65    Ca    9.2      2020 10:45    TPro  7.5  /  Alb  4.0  /  TBili  0.3  /  DBili  x   /  AST  20  /  ALT  22  /  AlkPhos  105  12    I&O's Detail      Urinalysis Basic - ( 2020 10:55 )    Color: YELLOW / Appearance: CLEAR / S.029 / pH: 6.0  Gluc: NEGATIVE / Ketone: NEGATIVE  / Bili: NEGATIVE / Urobili: NORMAL   Blood: TRACE / Protein: 20 / Nitrite: NEGATIVE   Leuk Esterase: NEGATIVE / RBC: 0-2 / WBC 0-2   Sq Epi: OCC / Non Sq Epi: x / Bacteria: NEGATIVE        RADIOLOGY & ADDITIONAL STUDIES: R2 GYN CONSULT NOTE    Note backcharted 2/2 clinical duties. Patient seen at 3pm with Dr. Shaw    HPI: 38 yo G0 LMP 15 with PMHx of PCOS and HLD presents to the ER c/o 1 week of left sided pelvic pain and 1 day of diarrhea. She states that the pain was 8/10 prior to arrival to the ED and now it is a 7/10. The pain is not constant is not related to position. It has become worse in the last two days, she describes it as throbbing and occasionally stabbing. She denies n/v and has had no sick contacts. She denies fever, chills, SOB, CP, dysuria. In 2019 patient had D&C hysteroscopy attempted but 2/2 cervical stenosis it could not be completed and endometrium has not been sampled. ECC was benign. Patient has a history of infertility and was being referred to [facility] at her last visit with GYN.    Name of GYN Physician: Dr. Shaw    POB:  P0    Pgyn: Hx of PCOS, suspected endometriosis. Denies fibroids, STI's, Abnormal pap smears.     Home meds: none    Allergies: No Known Allergies    PAST MEDICAL & SURGICAL HISTORY:  Amenorrhea  PCOS (polycystic ovarian syndrome)  Hyperlipidemia  No significant past surgical history  D&C hysteroscopy       FAMILY HISTORY:  Family history of hyperlipidemia (Mother, Father)  Family history of hypertension (Mother)      Social History:  Denies smoking use, drug use, alcohol use.   +occasional social alcohol use    Vital Signs Last 24 Hrs  T(C): 37 (2020 15:32), Max: 37.1 (2020 13:51)  T(F): 98.6 (2020 15:32), Max: 98.7 (2020 13:51)  HR: 73 (2020 15:32) (72 - 78)  BP: 126/65 (2020 15:32) (125/72 - 129/66)  BP(mean): --  RR: 16 (2020 15:32) (16 - 16)  SpO2: 98% (2020 15:32) (98% - 100%)    Physical Exam:   General: sitting comfortably in bed, NAD   Back: No CVA tenderness  Abd: Soft, mildly tender in LLQ, non-distended.  Bowel sounds present.    :  No bleeding on pad.  External labia wnl.  Bimanual exam with no cervical motion tenderness, uterus wnl, adnexa non palpable b/l.  Cervix closed.    LABS:                          12.8   10.77 )-----------( 355      ( 2020 10:45 )             40.1         139  |  106  |  12  ----------------------------<  101<H>  3.8   |  22  |  0.65    Ca    9.2      2020 10:45    TPro  7.5  /  Alb  4.0  /  TBili  0.3  /  DBili  x   /  AST  20  /  ALT  22  /  AlkPhos  105      I&O's Detail      Urinalysis Basic - ( 2020 10:55 )    Color: YELLOW / Appearance: CLEAR / S.029 / pH: 6.0  Gluc: NEGATIVE / Ketone: NEGATIVE  / Bili: NEGATIVE / Urobili: NORMAL   Blood: TRACE / Protein: 20 / Nitrite: NEGATIVE   Leuk Esterase: NEGATIVE / RBC: 0-2 / WBC 0-2   Sq Epi: OCC / Non Sq Epi: x / Bacteria: NEGATIVE      RADIOLOGY & ADDITIONAL STUDIES:    < from: US Transvaginal (20 @ 11:35) >    EXAM:  US TRANSVAGINAL        PROCEDURE DATE:  2020         INTERPRETATION:  CLINICAL INFORMATION: Left lower quadrant pain.    LMP: January 15, 2020.    COMPARISON: None available.    TECHNIQUE:     Endovaginal and transabdominal pelvic sonogram. Color and Spectral Doppler was performed.     FINDINGS:    Uterus: 9.2 x 4.5 x 5.0 cm. Subendometrial cyst at the fundus. Endometrial stripe measures 10 mm.    Right ovary: 7.2 x 3.8 x 5.4 cm. Complex cysts, measuring 5.1 x 3.9 x 4.8 cm and 2.0x 1.9 x 2.0 cm. Flow is present.    Left ovary: 3.7 x 3.1 x 3.7 cm. Complex cyst, measuring 2.8 x 2.1 x 2.7 cm. Dilated tubular structure in the left adnexa with complex internal echoes    Fluid: None.    IMPRESSION:    Bilateral complex ovarian cysts, suspicious for endometriomas. Subendometrial cyst at the uterine fundus.    Probable left hemosalpinx.    OMAIRA HANLEY M.D., ATTENDING RADIOLOGIST  This document has been electronically signed. 2020 12:06PM        < end of copied text >    	  < from: CT Abdomen and Pelvis w/ IV Cont (20 @ 17:46) >  EXAM:  CT ABDOMEN AND PELVIS IC        PROCEDURE DATE:  2020         INTERPRETATION:  CLINICAL INFORMATION: Left-sided abdominal pain.    COMPARISON: Transvaginal pelvic sonogram performed earlier on 2020.    PROCEDURE:   CT of the Abdomen and Pelvis was performed with intravenous contrast.   Intravenous contrast: 90 ml Omnipaque 350. 10 ml discarded.  Oral contrast: None.  Sagittal and coronal reformats were performed.    FINDINGS:    LOWER CHEST: Within normal limits.    LIVER: Within normal limits.  BILE DUCTS: Normal caliber.  GALLBLADDER: Within normal limits.  SPLEEN: Within normal limits.  PANCREAS: Within normal limits.  ADRENALS: Within normal limits.  KIDNEYS/URETERS: Symmetrically enhance without hydronephrosis. Left lower pole cyst.    BLADDER: Within normal limits.  REPRODUCTIVE ORGANS: Redemonstration of bilateral ovarian cysts, some of which demonstrate higher attenuation than fluid. The largest on the right measures 5.2 x 5.0 x 5.4 cm and the largest on the left measures 3.0 x 2.7 x 2.8 cm. Left hydrosalpinx is present. The uterus is normal in appearance.    BOWEL: No bowel obstruction. Focal short segment narrowing of the distal transverse colon (602:30). Appendix is normal.  PERITONEUM: No ascites.  VESSELS: Within normal limits.  RETROPERITONEUM/LYMPH NODES: No lymphadenopathy.    ABDOMINAL WALL: Within normal limits.  BONES: Within normal limits.    IMPRESSION:     Bilateral ovarian cysts with slight internal complexity, better evaluated on concurrent pelvic sonogram and suspicious for endometriomas. Left hydrosalpinx, also better evaluated on concurrent pelvic sonogram.    No intra-abdominal free fluid.    Focal short segment narrowing of the distal transverse colon which may be on the basis of peristalsis, however underlying mass is not excluded. Further evaluation with colonoscopy is recommended.    GRANT RIDDLE M.D., RADIOLOGY RESIDENT  This document has been electronically signed.  CAIT CANTRELL M.D., ATTENDING RADIOLOGIST  This document has been electronically signed. 2020  6:24PM     < end of copied text >

## 2020-02-12 NOTE — ED PROVIDER NOTE - PATIENT PORTAL LINK FT
You can access the FollowMyHealth Patient Portal offered by [facility] by registering at the following website: [email or web address]. By joining [facility]’s FollowMyHealth portal, you will also be able to view your health information using other applications (apps) compatible with our system.

## 2020-02-12 NOTE — ED PROVIDER NOTE - CLINICAL SUMMARY MEDICAL DECISION MAKING FREE TEXT BOX
40 y/o female with a hx of PCOS and HLD presents to the ER c/o 1 week of left sided pelvic pain and 1 day of diarrhea; pt is well appearing, NAD, will check labs, US, reassess.

## 2020-02-12 NOTE — ED PROVIDER NOTE - PROGRESS NOTE DETAILS
JOSEPH Koo: pt seen and evaluated by OB/GYN Dr Lyles pt's OB/GYN, advised to obtain CT abdomen per GYN unlikely GYN related.  CT ordered, pt aware of plan, pt resting comfortably NAD. JOSEPH Koo: pt feels better, pain improved.  Spoke with GYN regarding CT results states patient can follow up outpatient with Dr Lyles.  Pt advised of CT and US results copy of results provided; pt advised to follow up with PMD and GI for colonoscopy regarding CT results.  D/w Dr Lopes.

## 2020-02-12 NOTE — CONSULT NOTE ADULT - PROBLEM SELECTOR RECOMMENDATION 9
-on review of outpatient sonos, patient's TVUS is overall unchanged  -pain may be related to oncoming menses in setting of likely endometriosis  -exam is not concerning for torsion  -would recommend CT to assess GI pathology/cause for pain (CT reviewed as it has resulted at time of writing this note): consistent with TVUS with no GYN intervention needed at this time. Patient may follow up with Dr. Shaw for evaluation of endometriosis. Abnormal appearance of the distal transverse colon should be evaluated outpatient.  -recommend NSAIDs for pain    Patient see with Dr. Valeria Marr PGY-2

## 2020-02-12 NOTE — ED PROVIDER NOTE - NSFOLLOWUPINSTRUCTIONS_ED_ALL_ED_FT
Follow up with your Primary Medical Doctor in 1-2 days.  Follow up with Gastroenterology in 1-2 days for colonoscopy and further evaluation, bring a copy of your results.  Follow up with your OB/GYN in 1-2 days Dr Lyles.  Rest.  Drink plenty of fluids.  Return to the ER for any persistent/worsening or new symptoms fevers, chills, vomiting, weakness, dizziness, pain or any concerning symptoms.

## 2020-02-13 LAB
BACTERIA UR CULT: SIGNIFICANT CHANGE UP
SPECIMEN SOURCE: SIGNIFICANT CHANGE UP

## 2020-03-16 ENCOUNTER — APPOINTMENT (OUTPATIENT)
Dept: GASTROENTEROLOGY | Facility: CLINIC | Age: 40
End: 2020-03-16

## 2020-04-09 ENCOUNTER — APPOINTMENT (OUTPATIENT)
Dept: DISASTER EMERGENCY | Facility: CLINIC | Age: 40
End: 2020-04-09

## 2020-04-26 ENCOUNTER — MESSAGE (OUTPATIENT)
Age: 40
End: 2020-04-26

## 2020-05-13 ENCOUNTER — APPOINTMENT (OUTPATIENT)
Dept: OBGYN | Facility: CLINIC | Age: 40
End: 2020-05-13
Payer: COMMERCIAL

## 2020-05-13 VITALS
SYSTOLIC BLOOD PRESSURE: 118 MMHG | BODY MASS INDEX: 36.65 KG/M2 | HEIGHT: 65 IN | WEIGHT: 220 LBS | DIASTOLIC BLOOD PRESSURE: 87 MMHG | HEART RATE: 60 BPM | TEMPERATURE: 98 F

## 2020-05-13 DIAGNOSIS — Z30.011 ENCOUNTER FOR INITIAL PRESCRIPTION OF CONTRACEPTIVE PILLS: ICD-10-CM

## 2020-05-13 DIAGNOSIS — N70.11 CHRONIC SALPINGITIS: ICD-10-CM

## 2020-05-13 DIAGNOSIS — N80.1 ENDOMETRIOSIS OF OVARY: ICD-10-CM

## 2020-05-13 DIAGNOSIS — N94.6 DYSMENORRHEA, UNSPECIFIED: ICD-10-CM

## 2020-05-13 PROCEDURE — 99213 OFFICE O/P EST LOW 20 MIN: CPT

## 2020-05-25 ENCOUNTER — TRANSCRIPTION ENCOUNTER (OUTPATIENT)
Age: 40
End: 2020-05-25

## 2020-06-26 ENCOUNTER — APPOINTMENT (OUTPATIENT)
Dept: HUMAN REPRODUCTION | Facility: CLINIC | Age: 40
End: 2020-06-26
Payer: COMMERCIAL

## 2020-06-26 PROCEDURE — 99203 OFFICE O/P NEW LOW 30 MIN: CPT | Mod: 95

## 2020-08-10 ENCOUNTER — APPOINTMENT (OUTPATIENT)
Dept: ORTHOPEDIC SURGERY | Facility: CLINIC | Age: 40
End: 2020-08-10
Payer: OTHER MISCELLANEOUS

## 2020-08-10 VITALS
HEART RATE: 69 BPM | DIASTOLIC BLOOD PRESSURE: 80 MMHG | WEIGHT: 215 LBS | SYSTOLIC BLOOD PRESSURE: 132 MMHG | TEMPERATURE: 97.7 F | BODY MASS INDEX: 35.82 KG/M2 | HEIGHT: 65 IN

## 2020-08-10 DIAGNOSIS — M51.36 OTHER INTERVERTEBRAL DISC DEGENERATION, LUMBAR REGION: ICD-10-CM

## 2020-08-10 PROCEDURE — 99203 OFFICE O/P NEW LOW 30 MIN: CPT

## 2020-08-10 NOTE — PHYSICAL EXAM
[de-identified] : Examination of the lumbar spine reveals no midline tenderness palpation, step-offs, or skin lesions. Decreased range of motion with respect to flexion, extension, lateral bending, and rotation. No tenderness to palpation of the sciatic notch. No tenderness palpation of the bilateral greater trochanters. No pain with passive internal/external rotation of the hips. No instability of bilateral lower extremities.  Negative SCOTT. Negative straight leg raise bilaterally. No bowstring. Negative femoral stretch. 5 out of 5 iliopsoas, hip abductors, hips adductors, quadriceps, hamstrings, gastrocsoleus, tibialis anterior, extensor hallucis longus, peroneals. Grossly intact sensation to light touch bilateral lower extremities. 1+ patellar and Achilles reflexes. Downgoing Babinski. No clonus. Intact proprioception. Palpable pulses. No skin lesion and no edema on the right and left lower extremities. [de-identified] : Review of her previous lumbar spine MRI reveals degenerative disc disease with an annular tear at L4-5

## 2020-08-10 NOTE — HISTORY OF PRESENT ILLNESS
[de-identified] : Ms. DARCI GAVIRIA  is a 40 year old female who presents with  a chronic history of low back pain since 2018 when she was injured at work. She is a pharmacy tech.  She has pain and numbness down the left leg in her hamstring and calf.  She has tried physical therapy, nsaids, and tylenol without any relief.   Normal bowel and bladder control.   Denies any recent fevers, chills, sweats, weight loss, or infection.\par \par The patients past medical history, past surgical history, medications, allergies, and social history were reviewed by me today with the patient and documented accordingly.  In addition, the patient's family history, which is noncontributory to their visit, was also reviewed.\par

## 2020-08-10 NOTE — DISCUSSION/SUMMARY
[de-identified] : We discussed further treatment options.  She would like to try some additional physical therapy.  Should this fail to alleviate her symptoms she will contemplate epidural injections.

## 2020-09-16 ENCOUNTER — FORM ENCOUNTER (OUTPATIENT)
Age: 40
End: 2020-09-16

## 2020-12-21 NOTE — H&P PST ADULT - MEDICATION ADMINISTRATION INFO, PROFILE
Please call patient and ask if she would be interested in the IV antibody infusion for her current COVID infection. If she is, I can call her to give her more information.    no concerns

## 2021-03-02 ENCOUNTER — RESULT REVIEW (OUTPATIENT)
Age: 41
End: 2021-03-02

## 2021-04-02 ENCOUNTER — OUTPATIENT (OUTPATIENT)
Dept: OUTPATIENT SERVICES | Facility: HOSPITAL | Age: 41
LOS: 1 days | End: 2021-04-02
Payer: COMMERCIAL

## 2021-04-02 ENCOUNTER — APPOINTMENT (OUTPATIENT)
Dept: MRI IMAGING | Facility: IMAGING CENTER | Age: 41
End: 2021-04-02
Payer: COMMERCIAL

## 2021-04-02 DIAGNOSIS — Z00.8 ENCOUNTER FOR OTHER GENERAL EXAMINATION: ICD-10-CM

## 2021-04-02 PROCEDURE — 72197 MRI PELVIS W/O & W/DYE: CPT | Mod: 26

## 2021-04-02 PROCEDURE — A9585: CPT

## 2021-04-02 PROCEDURE — 72197 MRI PELVIS W/O & W/DYE: CPT

## 2021-06-15 ENCOUNTER — OUTPATIENT (OUTPATIENT)
Dept: OUTPATIENT SERVICES | Facility: HOSPITAL | Age: 41
LOS: 1 days | End: 2021-06-15

## 2021-06-15 VITALS
RESPIRATION RATE: 16 BRPM | HEART RATE: 90 BPM | DIASTOLIC BLOOD PRESSURE: 78 MMHG | HEIGHT: 63 IN | SYSTOLIC BLOOD PRESSURE: 116 MMHG | TEMPERATURE: 97 F | WEIGHT: 216.05 LBS | OXYGEN SATURATION: 97 %

## 2021-06-15 DIAGNOSIS — R10.2 PELVIC AND PERINEAL PAIN: ICD-10-CM

## 2021-06-15 LAB
ANION GAP SERPL CALC-SCNC: 15 MMOL/L — HIGH (ref 7–14)
BLD GP AB SCN SERPL QL: NEGATIVE — SIGNIFICANT CHANGE UP
BUN SERPL-MCNC: 16 MG/DL — SIGNIFICANT CHANGE UP (ref 7–23)
CALCIUM SERPL-MCNC: 9.7 MG/DL — SIGNIFICANT CHANGE UP (ref 8.4–10.5)
CHLORIDE SERPL-SCNC: 102 MMOL/L — SIGNIFICANT CHANGE UP (ref 98–107)
CO2 SERPL-SCNC: 22 MMOL/L — SIGNIFICANT CHANGE UP (ref 22–31)
CREAT SERPL-MCNC: 0.85 MG/DL — SIGNIFICANT CHANGE UP (ref 0.5–1.3)
GLUCOSE SERPL-MCNC: 84 MG/DL — SIGNIFICANT CHANGE UP (ref 70–99)
HCG UR QL: NEGATIVE — SIGNIFICANT CHANGE UP
HCT VFR BLD CALC: 43.3 % — SIGNIFICANT CHANGE UP (ref 34.5–45)
HGB BLD-MCNC: 13.5 G/DL — SIGNIFICANT CHANGE UP (ref 11.5–15.5)
MCHC RBC-ENTMCNC: 27.4 PG — SIGNIFICANT CHANGE UP (ref 27–34)
MCHC RBC-ENTMCNC: 31.2 GM/DL — LOW (ref 32–36)
MCV RBC AUTO: 88 FL — SIGNIFICANT CHANGE UP (ref 80–100)
NRBC # BLD: 0 /100 WBCS — SIGNIFICANT CHANGE UP
NRBC # FLD: 0 K/UL — SIGNIFICANT CHANGE UP
PLATELET # BLD AUTO: 413 K/UL — HIGH (ref 150–400)
POTASSIUM SERPL-MCNC: 3.8 MMOL/L — SIGNIFICANT CHANGE UP (ref 3.5–5.3)
POTASSIUM SERPL-SCNC: 3.8 MMOL/L — SIGNIFICANT CHANGE UP (ref 3.5–5.3)
RBC # BLD: 4.92 M/UL — SIGNIFICANT CHANGE UP (ref 3.8–5.2)
RBC # FLD: 13.2 % — SIGNIFICANT CHANGE UP (ref 10.3–14.5)
RH IG SCN BLD-IMP: POSITIVE — SIGNIFICANT CHANGE UP
SODIUM SERPL-SCNC: 139 MMOL/L — SIGNIFICANT CHANGE UP (ref 135–145)
WBC # BLD: 13.09 K/UL — HIGH (ref 3.8–10.5)
WBC # FLD AUTO: 13.09 K/UL — HIGH (ref 3.8–10.5)

## 2021-06-15 RX ORDER — SODIUM CHLORIDE 9 MG/ML
1000 INJECTION, SOLUTION INTRAVENOUS
Refills: 0 | Status: DISCONTINUED | OUTPATIENT
Start: 2021-07-01 | End: 2021-07-15

## 2021-06-15 NOTE — H&P PST ADULT - RESPIRATORY AND THORAX
Subjective





- Subjective


Reason for Note: Progress Note


History: 





I reviewed Zen Flood's presentation with the patient and her .  

She has had a cough that was evaluated as an outpatient.  This steadily 

worsened and became productive. She developed a fever and 2 days prior to 

presentation whilst her  was out she feel in her bathroom and struck the 

right side of her head. She was able to get herself up.  She became 

progressively weaker and had anorexia for 3 days - wasn't eating.  She has 

wheezed and had some shortness of breath.  She has had no pleuritic chest pain.


Active Problems: 


 Active Problems





Fall (Acute) 


Head contusion (Acute) S00.93XA


Right lower lobe pneumonia (Acute) J18.1


Sepsis (Acute) 


GERD (gastroesophageal reflux disease) (Chronic) K21.9


HTN (hypertension) (Chronic) I10


History of CVA (cerebrovascular accident) (Chronic) Z86.73


Hx of radiofrequency ablation for complex left atrial arrhythmia (Chronic) 

Z98.89, Z86.79








Current Medications: 


 Current Medications





Acetaminophen (Tylenol Tab*)  650 mg PO Q4H PRN


   PRN Reason: FEVER/PAIN


   Last Admin: 18 15:38 Dose:  650 mg


Al Hydrox/Mg Hydrox/Simethicone (Maalox Plus*)  30 ml PO Q6H PRN


   PRN Reason: INDIGESTION


Albuterol (Ventolin 2.5 Mg/3 Ml Neb.Sol*)  2.5 mg INH [id number] AWAKE PRN


   PRN Reason: sob/wheezing


Docusate Sodium (Colace Cap*)  100 mg PO BID Formerly [facility], 1928–1983


   Last Admin: 18 21:38 Dose:  100 mg


Guaifenesin (Mucinex*)  600 mg PO BID Formerly [facility], 1928–1983


   Last Admin: 18 21:39 Dose:  600 mg


Levofloxacin/Dextrose (Levaquin 250 Mg Ivpremx(*))  250 mg in 50 mls @ 50 mls/

hr IVPB Q24H Formerly [facility], 1928–1983


Sodium Chloride (Ns 0.9% 1000 Ml*)  1,000 mls @ 75 mls/hr IV PER RATE Formerly [facility], 1928–1983


   Last Admin: 18 19:14 Dose:  75 mls/hr


Losartan Potassium (Cozaar Tab*)  50 mg PO BID Formerly [facility], 1928–1983


   Last Admin: 18 21:39 Dose:  50 mg


Magnesium Hydroxide (Milk Of Magnesia Liq*)  30 ml PO Q4H PRN


   PRN Reason: CONSTIPATION


Ondansetron HCl (Zofran Inj*)  4 mg IV Q4H PRN


   PRN Reason: NAUSEA/VOMITING


Oxycodone/Acetaminophen (Percocet 5/325 Tab*)  1 tab PO Q4H PRN


   PRN Reason: Pain


Rivaroxaban (Xarelto(*))  20 mg PO DAILY JORDAN


   Last Admin: 18 21:38 Dose:  20 mg











- Review of Systems


Constitutional Symptoms: Yes: Weakness, Unexplained Falls


Pulmonary: Positive: Cough, Sputum, Hemoptysis - yesterday, Wheezing, Shortness 

of Breath


   Negative: Respiratory Distress


Cardiology: 


   Negative: Chest Pain, Palpitations, Swelling of Ankles


Gastroenterology: Positive: Anorexia, Constipation, Change in Bowel Habits


   Negative: Abdominal Pain, Nausea, Vomiting


Genital - Urinary: 


   Negative: Dysuria


Home Medications: 


 Home Medications











 Medication  Instructions  Recorded  Confirmed  Type


 


Rivaroxaban TAB(*) [Xarelto (NF)] 20 mg PO DAILY 16 History


 


Losartan TAB* [Cozaar TAB*] 50 mg PO BID 16 History











Allergies: 


 Allergies











Allergy/AdvReac Type Severity Reaction Status Date / Time


 


erythromycin base Allergy  Nausea And Verified 18 23:32





   Vomiting  


 


Sulfa (Sulfonamide Allergy  Nausea And Verified 18 23:32





Antibiotics)   Vomiting  














Objective





- Vital Signs


Vital Signs: 


 Vital Signs











  18





  13:57 14:00 14:27


 


Temperature   


 


Pulse Rate 88 87 86


 


Respiratory 26 26 18





Rate   


 


Blood Pressure 131/68  124/58





(mmHg)   


 


O2 Sat by Pulse 92 91 92





Oximetry   














  18





  14:57 15:00 15:29


 


Temperature   


 


Pulse Rate 89 88 89


 


Respiratory 25 24 23





Rate   


 


Blood Pressure 120/56  133/72





(mmHg)   


 


O2 Sat by Pulse 92 91 95





Oximetry   














  18





  16:05 16:19 19:26


 


Temperature 100.2 F 101 F 98.2 F


 


Pulse Rate 131 91 86


 


Respiratory 20 23 20





Rate   


 


Blood Pressure 132/69 133/72 125/54





(mmHg)   


 


O2 Sat by Pulse 96 95 98





Oximetry   














  05/14/18 05/14/18 05/15/18





  19:55 23:47 03:52


 


Temperature  98.1 F 98.5 F


 


Pulse Rate  82 97


 


Respiratory 20 20 16





Rate   


 


Blood Pressure  128/57 136/64





(mmHg)   


 


O2 Sat by Pulse  96 99





Oximetry   














- Intake and Output


Intake and Output: 


 Intake & Output











 05/12/18 05/13/18 05/14/18 05/15/18





 11:59 11:59 11:59 11:59


 


Intake Total    1206


 


Output Total    200


 


Balance    1006


 


Weight    158 lb


 


Intake:    


 


  IV Fluids    966


 


    NS (0.9%)    966


 


  Oral    240


 


Output:    


 


  Urine    200


 


Other:    


 


  Estimated Void    Medium


 


  # Bowel Movements    0


 


  # Voids    1





 





ADLs: Meal  Record                                         Start:  18 15:

41


Freq:   DAILY@0900,1400,1800                               Status: Active      

  


Protocol:                                                                      

  


 Document     18 22:13  [id number]  (Rec: 18 22:14  [id number]  TELE-C13)


Intake and Output                                          Start:  18 15:

41


Freq:   DAILY@0600,1400,2200                               Status: Active      

  


Protocol:                                                                      

  


 Document     18 22:14  [id number]  (Rec: 18 22:16  [id number]  TELE-C13)


 Document     05/15/18 06:00  [id number]  (Rec: 05/15/18 07:01  [id number]  TELE-C35)











- Physical Exam


General Physical Exam Comment: hematoma on right temple


General: No Cyanosis, No Anemia, No Jaundice, No Clubbing


Lungs and Chest: Yes: Chest Expansion Full, Chest Expansion Symetrica, Crackles 

- some on the right base, Wheezes - bilaterally right > left.  No: Percussion 

Note Resonant - dull right base, Vessicular Breath Sounds - patchy brochial 

breathing right base, Respiratory Distress, Use of Accessory Muscles


Heart Rate and Rhythm: Irregular


Additional Cardiovascular: Yes: Normal Heart Sounds.  No: Heart Murmur, Pedal 

Edema


Abdominal Exam: Yes: Soft, Bowel Sounds Present.  No: Distention, Rigidity, 

Abdominal Mass, Hepatomegaly, Abdominal Tenderness





- Extremities


Cranial Nerves II-XII Intact: Yes


Limbs: Normal Power, Normal Tone, Normal Coordination





- Neuro


Orientation: A/O x3


Speech: Normal





Results





- Results


Lab Results: 


 Laboratory Results - last 24 hr











  05/14/18 05/14/18 05/15/18





  14:18 15:59 04:53


 


WBC    18.3 H


 


RBC    3.60 L


 


Hgb    9.3 L


 


Hct    29 L


 


MCV    79 L


 


MCH    26 L


 


MCHC    33


 


RDW    17 H


 


Plt Count    222


 


MPV    9.5


 


Neut % (Auto)    81.8


 


Lymph % (Auto)    11.8 L


 


Mono % (Auto)    6.3


 


Eos % (Auto)    0


 


Baso % (Auto)    0.1


 


Absolute Neuts (auto)    15.0 H


 


Absolute Lymphs (auto)    2.1


 


Absolute Monos (auto)    1.1 H


 


Absolute Eos (auto)    0


 


Absolute Basos (auto)    0


 


Absolute Nucleated RBC    0


 


Nucleated RBC %    0


 


Sodium   


 


Potassium   


 


Chloride   


 


Carbon Dioxide   


 


Anion Gap   


 


BUN   


 


Creatinine   


 


Est GFR ( Amer)   


 


Est GFR (Non-Af Amer)   


 


BUN/Creatinine Ratio   


 


Glucose   


 


Calcium   


 


Magnesium   


 


Total Bilirubin   


 


AST   


 


ALT   


 


Alkaline Phosphatase   


 


Total Protein   


 


Albumin   


 


Globulin   


 


Albumin/Globulin Ratio   


 


Urine Color   Jody 


 


Urine Appearance   Cloudy 


 


Urine pH   5.0 


 


Ur Specific Gravity   1.023 


 


Urine Protein   2+(100 mg/dl) A 


 


Urine Ketones   Trace A 


 


Urine Blood   Negative 


 


Urine Nitrate   Negative 


 


Urine Bilirubin   Negative 


 


Urine Urobilinogen   Negative 


 


Ur Leukocyte Esterase   Negative 


 


Urine WBC (Auto)   Trace(0-5/hpf) 


 


Urine RBC (Auto)   2+(6-10/hpf) A 


 


Ur Squamous Epith Cells   Present A 


 


Urine Bacteria   Absent 


 


Urine Glucose   Negative 


 


Urine Ascorbic Acid   * A 


 


Influenza A (Rapid)  Negative  


 


Influenza B (Rapid)  Negative  














  05/15/18





  04:53


 


WBC 


 


RBC 


 


Hgb 


 


Hct 


 


MCV 


 


MCH 


 


MCHC 


 


RDW 


 


Plt Count 


 


MPV 


 


Neut % (Auto) 


 


Lymph % (Auto) 


 


Mono % (Auto) 


 


Eos % (Auto) 


 


Baso % (Auto) 


 


Absolute Neuts (auto) 


 


Absolute Lymphs (auto) 


 


Absolute Monos (auto) 


 


Absolute Eos (auto) 


 


Absolute Basos (auto) 


 


Absolute Nucleated RBC 


 


Nucleated RBC % 


 


Sodium  133 L


 


Potassium  4.1


 


Chloride  105


 


Carbon Dioxide  20 L


 


Anion Gap  8


 


BUN  14


 


Creatinine  0.69


 


Est GFR ( Amer)  103.7


 


Est GFR (Non-Af Amer)  80.7


 


BUN/Creatinine Ratio  20.3 H


 


Glucose  113 H


 


Calcium  8.4 L


 


Magnesium  1.9


 


Total Bilirubin  0.70


 


AST  22


 


ALT  38


 


Alkaline Phosphatase  110 H


 


Total Protein  5.6 L


 


Albumin  3.0 L


 


Globulin  2.6


 


Albumin/Globulin Ratio  1.2


 


Urine Color 


 


Urine Appearance 


 


Urine pH 


 


Ur Specific Gravity 


 


Urine Protein 


 


Urine Ketones 


 


Urine Blood 


 


Urine Nitrate 


 


Urine Bilirubin 


 


Urine Urobilinogen 


 


Ur Leukocyte Esterase 


 


Urine WBC (Auto) 


 


Urine RBC (Auto) 


 


Ur Squamous Epith Cells 


 


Urine Bacteria 


 


Urine Glucose 


 


Urine Ascorbic Acid 


 


Influenza A (Rapid) 


 


Influenza B (Rapid) 











Radiology Results: 





Patient Name:         ZEN FLOOD                                         

                        Medical Record#: [id number]


Ordering Physician: Leroy Carroll MD                                        

                        Acct.#: [id number]


:     1931         Age: 87   Sex: F                                   

                        Location: EMERGENCY DEPARTMENT


Exam Date: 18 1024                                                       

                        ADM Status: REG ER


Order Information:                         CHEST AP PORTABLE


Accession Number:                          [id number]


CPT:                                       [id number]


Indication: Weakness.





Single frontal view of the chest performed at 1040 hours was reviewed.





Comparison is made with previous exam dated May 08, 2018.





No mediastinal shift is noted. Airspace disease in the right base not present 

on previous


exam is present. There is some atelectasis in the left midlung field as well.





IMPRESSION: SUGGESTION OF RIGHT BASILAR INFILTRATE AND PNEUMONIA.





____________________________________________________________


<Electronically signed by Lashonda Agosto MD in OV>  18 1054


Dictated By: Lashonda Agosto MD


Dictated Date/Time: 18 1054


Transcribed Date/Time: 18 1053


Copy to:











CC:Evan Naidu MD; Leroy Carroll MD


Imaging - [facility]                                 Imaging - [facility] 

[facility] 


[facility] [address]                                       [address] [address]


ph ([phone])                                     ph ([phone])        

                                        ph ([phone]) 














Patient Name:                    ZEN FLOOD                              

                                                                            

Medical Record#: [id number]


Ordering Physician: Leroy Carroll MD                                        

                                                                            

Acct.#: [id number]


:         1931                    Age: 87   Sex: F                    

                                                                            

Location: EMERGENCY DEPARTMENT


Exam Date: 18 1101                                                       

                                                                            ADM 

Status: REG ER


Order Information:                                               CT BRAIN WO


Accession Number:                                                [id number]


CPT:                                                             [id number]


HISTORY: Subacute trauma, anticoagulation





COMPARISONS: 2015





TECHNIQUE: Multiple contiguous axial CT scans were obtained of the head without 


intravenous contrast. 





FINDINGS: 


HEMORRHAGE/INFARCT: There is no hemorrhage or acute infarct.


MASSES/SHIFT: There is no mass or shift.





EXTRA-AXIAL SPACES: There are no extra-axial fluid collections.


SULCI AND VENTRICLES: The sulci and ventricles are normal in size and position 

for the


patient's stated age.





CEREBRUM: There is hypoattenuation of the periventricular and subcortical white 

matter.


There is a chronic infarct of the anterior limb of the left internal capsule


BRAINSTEM: There are no focal parenchymal abnormalities.


CEREBELLUM: There are no focal parenchymal abnormalities.





VESSELS: There is calcification of the cavernous segments of the internal 

carotid arteries


bilaterally.


PARANASAL SINUSES: There is mucosal thickening of the ethmoid air cells. There 

is a right


mastoid effusion.


ORBITS: Senile calcifications are noted.


BONES AND SOFT TISSUE: No bone or soft tissue abnormalities are noted.





OTHER: None





IMPRESSION: 


1.  NO ACUTE INTRACRANIAL PATHOLOGY.


2.  CHRONIC SMALL VESSEL ISCHEMIC CHANGE.


3.  RIGHT MASTOID EFFUSION.





____________________________________________________________


<Electronically signed by Torsten Bear MD in OV>  18 1212


Dictated By: Torsten Bear MD


Dictated Date/Time: 18 1212


Transcribed Date/Time: 18 1207


Copy to:











CC:Evan Naidu MD; Leroy Carroll MD


Imaging - [facility]                                                          

  Imaging - [facility]                                                 

                     Imaging - [facility] 


[address]                                                                

  [address]


                                                                               

                        1 [facility]






































                                                                    





EKG Report: 





Atrial fibrillation 89 QTc 446 QRS qaxis 52. No ST/T changes. No pathological Q 

waves





Assessment





- Problem List


Assessment: 


Patient Problems





Fall (Acute)


Head contusion (Acute)


Right lower lobe pneumonia (Acute)


Sepsis (Acute)


GERD (gastroesophageal reflux disease) (Chronic)


HTN (hypertension) (Chronic)


History of CVA (cerebrovascular accident) (Chronic)


Hx of radiofrequency ablation for complex left atrial arrhythmia (Chronic)








Plan: 





Right lower lobe pneumonia (Acute) Sepsis: The physical signs of her right 

pneumonia are much greater than the X-ray would suggest - I will repeat this 

today. Her neut% and WBC are coming down with the levofloxacin.  I will check a 

urine pneumonia/legionalla ag and tomorrow a CRP/pro-calcitonin to follow the 

course of this pneumonia.  She is eating and drinking again and doesn't require 

IVF.  She has some wheezing - I will ask for a consutation from RT.  Her BP, 

resp rate, and pulse rate are stable, she has a urine output and hence I think 

she is recovering from the sepsis


Fall (Acute)Head contusion (Acute)  I think this related to her infection - she 

has a bruise on her right forehead, but no signs of any intracranial pathology


GERD (gastroesophageal reflux disease) (Chronic)  secondary diagnosis


HTN (hypertension) (Chronic) secondary diagnosis


History of CVA (cerebrovascular accident) (Chronic) secondary diagnosis


Hx of radiofrequency ablation for complex left atrial arrhythmia (Chronic) She 

is anticoagulated and is currently in atrial fibrillation.





I spoke to the patient and her  - she feels she is improving. I 

explained that with pneumonia she may worsen from the inflammatory response/

sepsis, but even these signs are improving. details…

## 2021-06-15 NOTE — H&P PST ADULT - NSICDXPROBLEM_GEN_ALL_CORE_FT
PROBLEM DIAGNOSES  Problem: Pelvic and perineal pain  Assessment and Plan: pt scheduled for laparotomy, resection of right endometrioma on 07/01/21  Preop instructions provided. Pt verbalized understanding.   Pepcid for GI prophylaxis with written and verbal instruction provided    written and verbal instructions with teach back on chlorhexidine shampoo provided,  pt verbalized understanding with returned demonstration   no COVID vaccine, pt reports has appt for COVID test 3 days preop

## 2021-06-15 NOTE — H&P PST ADULT - NSICDXPASTMEDICALHX_GEN_ALL_CORE_FT
PAST MEDICAL HISTORY:  Amenorrhea     Hyperlipidemia     PCOS (polycystic ovarian syndrome)     Pelvic and perineal pain

## 2021-06-15 NOTE — H&P PST ADULT - PSYCHIATRIC
OUTPATIENT PROGRESS NOTE    CHIEF COMPLAINT:  Chief Complaint   Patient presents with   • Eye Exam     no vision changes       HPI:  The patient presented to the office for eye exam.  Current contacts are working fine.      ROS:  1. Do you have pain?  no  2. Do you have fever, chills, sweats or weight change? no  3. Do you have headaches or seizures? no  4. Do you have ringing in your ear/s or hearing loss? no  5. Do you have chest pain, palpitations or heart murmur? no  6. Do you have shortness of breath or wheezing? no  7. Do you have abdominal pain, nausea, vomiting, diarrhea or constipation? no  8. Do you have pain, frequent or difficulty with urination? no  9. Do you have joint or back pain? no  10. Do you have weakness, numbness or tingling? no  11. Do you have skin changes such as a rash? no  12. Do you experience easy bruising or bleeding? no  13. Are you depressed? no      PAST MEDICAL HISTORY:  Past Medical History:   Diagnosis Date   • Allergy    • Arthritis    • HLD (hyperlipidemia)    • Reactive airway disease    • Sleep apnea        SURGICAL HISTORY:  Past Surgical History:   Procedure Laterality Date   • COLONOSCOPY  04/30/2010   • KNEE ARTHROSCOPY W/ MENISCECTOMY  02/01/2013       FAMILY HISTORY:  History reviewed. No pertinent family history.    SOCIAL HISTORY:  Social History     Social History   • Marital status:      Spouse name: N/A   • Number of children: N/A   • Years of education: N/A     Occupational History   • Not on file.     Social History Main Topics   • Smoking status: Never Smoker   • Smokeless tobacco: Never Used   • Alcohol use Not on file   • Drug use: Not on file   • Sexual activity: Not on file     Other Topics Concern   • Not on file     Social History Narrative         I reviewed testing done by technician found in [employer].    Base Eye Exam     Visual Acuity (Snellen - Linear)      Right Left   Dist cc 20/20 20/20 -2       Correction:  Contacts      Tonometry (Applanation,  3:01 PM)      Right Left   Pressure 15 15         Extraocular Movement      Right Left   Result Full Full         Neuro/Psych     Oriented x3:  Yes    Mood/Affect:  Normal      Dilation     Both eyes:  Paremyd @ 2:59 PM            Slit Lamp and Fundus Exam     External Exam      Right Left    External Normal Normal      Slit Lamp Exam      Right Left    Lids/Lashes Normal Normal    Conjunctiva/Sclera Clear Clear    Cornea Clear Clear    Anterior Chamber Deep and quiet Deep and quiet    Iris Round and regular Round and regular    Lens Clear Clear    Vitreous Clear Clear      Fundus Exam      Right Left    Disc Flat, pink with a healthy rim Flat, pink with a healthy rim    C/D Ratio 0.2 0.3    Macula Healthy with sharp foveal reflex Healthy with sharp foveal reflex    Vessels Healthy with normal Artery-Vein ratio Healthy with normal Artery-Vein ratio    Periphery Flat, healthy, without tears or detachments Flat, healthy, without tears or detachments            Contact Lens Exam     Current Contact Lens Rx      Brand Base Curve Diameter Sphere Addl. Specs Over-Sphere   Right Air Optix Aqua Multifocal 8.6 14.2 -3.75 Medium +0.25   Left Air Optix Aqua Multifocal 8.6 14.2 -2.75 Medium +0.25         Final Contact Lens Rx      Brand Base Curve Diameter Sphere Addl. Specs   Right Air Optix Aqua Multifocal 8.6 14.2 -3.50 Medium   Left Air Optix Aqua Multifocal 8.6 14.2 -2.50 Medium                           ASSESSMENT:  1. Myopia with astigmatism and presbyopia, bilateral    No significant change in refractive error, ocular health within normal limits both eyes      PLAN:  We'll make a slight change to present contact lens power dilated exam again within 2 years     Orders Placed This Encounter   • REFRACTION     Return in about 2 years (around 5/9/2019).    Final Rx Glasses      Expiration Date:               Affect and characteristics of appearance, verbalizations, behaviors are appropriate

## 2021-06-15 NOTE — H&P PST ADULT - HISTORY OF PRESENT ILLNESS
41 y.o. female G0, LMP 05/21/2021, with h/o HLD, c/o pelvic pain x 1 year, denies abnormal vaginal bleeding, s/p transvaginal ultrasound and abd/pelvic CT scan, presents to PST with preop diagnosis pelvic and perineal pain, scheduled for laparotomy, resection of right endometrioma 41 y.o. female G0, LMP 05/21/2021, h/o HLD, c/o pelvic pain x 1 year, denies abnormal vaginal bleeding, s/p transvaginal ultrasound and abd/pelvic CT scan, presents to PST with preop diagnosis pelvic and perineal pain, scheduled for laparotomy, resection of right endometrioma

## 2021-06-15 NOTE — H&P PST ADULT - NEGATIVE GENERAL GENITOURINARY SYMPTOMS
no hematuria/no renal colic/no flank pain L/no flank pain R/no bladder infections/no incontinence/normal urinary frequency

## 2021-06-15 NOTE — H&P PST ADULT - ATTENDING COMMENTS
41 y.o. f G0, LMP 05/21/2021,  c/o pelvic pain x 1 year, denies abnormal vaginal bleeding, s/p transvaginal ultrasound and abd/pelvic CT scan/ MRI c/w endometriomas and planned laparoscopic endometriosis resection and all potential surgeries necessary She  is aware of potential oophorectomy. All the complications and potential risks were discussed in preop consultation office visit yesterday. She is aware of all and singed informed consent.

## 2021-06-28 ENCOUNTER — APPOINTMENT (OUTPATIENT)
Dept: DISASTER EMERGENCY | Facility: CLINIC | Age: 41
End: 2021-06-28

## 2021-06-28 DIAGNOSIS — Z01.818 ENCOUNTER FOR OTHER PREPROCEDURAL EXAMINATION: ICD-10-CM

## 2021-06-29 LAB — SARS-COV-2 N GENE NPH QL NAA+PROBE: NOT DETECTED

## 2021-06-30 ENCOUNTER — TRANSCRIPTION ENCOUNTER (OUTPATIENT)
Age: 41
End: 2021-06-30

## 2021-07-01 ENCOUNTER — RESULT REVIEW (OUTPATIENT)
Age: 41
End: 2021-07-01

## 2021-07-01 ENCOUNTER — OUTPATIENT (OUTPATIENT)
Dept: OUTPATIENT SERVICES | Facility: HOSPITAL | Age: 41
LOS: 1 days | Discharge: HOME | End: 2021-07-01
Payer: COMMERCIAL

## 2021-07-01 VITALS
RESPIRATION RATE: 16 BRPM | HEART RATE: 96 BPM | OXYGEN SATURATION: 97 % | DIASTOLIC BLOOD PRESSURE: 83 MMHG | SYSTOLIC BLOOD PRESSURE: 138 MMHG

## 2021-07-01 VITALS
DIASTOLIC BLOOD PRESSURE: 88 MMHG | RESPIRATION RATE: 16 BRPM | SYSTOLIC BLOOD PRESSURE: 138 MMHG | OXYGEN SATURATION: 100 % | HEIGHT: 63 IN | TEMPERATURE: 99 F | HEART RATE: 77 BPM | WEIGHT: 216.05 LBS

## 2021-07-01 DIAGNOSIS — R10.2 PELVIC AND PERINEAL PAIN: ICD-10-CM

## 2021-07-01 LAB
HCG UR QL: NEGATIVE — SIGNIFICANT CHANGE UP
RH IG SCN BLD-IMP: POSITIVE — SIGNIFICANT CHANGE UP

## 2021-07-01 PROCEDURE — 88307 TISSUE EXAM BY PATHOLOGIST: CPT | Mod: 26

## 2021-07-01 PROCEDURE — 88302 TISSUE EXAM BY PATHOLOGIST: CPT | Mod: 26

## 2021-07-01 RX ORDER — OXYCODONE HYDROCHLORIDE 5 MG/1
5 TABLET ORAL EVERY 6 HOURS
Refills: 0 | Status: DISCONTINUED | OUTPATIENT
Start: 2021-07-01 | End: 2021-07-01

## 2021-07-01 RX ORDER — CEPHALEXIN 500 MG
1 CAPSULE ORAL
Qty: 6 | Refills: 0
Start: 2021-07-01 | End: 2021-07-03

## 2021-07-01 RX ORDER — FENTANYL CITRATE 50 UG/ML
25 INJECTION INTRAVENOUS
Refills: 0 | Status: DISCONTINUED | OUTPATIENT
Start: 2021-07-01 | End: 2021-07-01

## 2021-07-01 RX ORDER — ONDANSETRON 8 MG/1
4 TABLET, FILM COATED ORAL ONCE
Refills: 0 | Status: DISCONTINUED | OUTPATIENT
Start: 2021-07-01 | End: 2021-07-15

## 2021-07-01 RX ORDER — IBUPROFEN 200 MG
600 TABLET ORAL EVERY 6 HOURS
Refills: 0 | Status: DISCONTINUED | OUTPATIENT
Start: 2021-07-01 | End: 2021-07-15

## 2021-07-01 RX ORDER — ACETAMINOPHEN 500 MG
975 TABLET ORAL EVERY 6 HOURS
Refills: 0 | Status: DISCONTINUED | OUTPATIENT
Start: 2021-07-01 | End: 2021-07-15

## 2021-07-01 RX ORDER — ACETAMINOPHEN 500 MG
3 TABLET ORAL
Qty: 0 | Refills: 0 | DISCHARGE
Start: 2021-07-01

## 2021-07-01 RX ORDER — OXYCODONE AND ACETAMINOPHEN 5; 325 MG/1; MG/1
1 TABLET ORAL ONCE
Refills: 0 | Status: DISCONTINUED | OUTPATIENT
Start: 2021-07-01 | End: 2021-07-01

## 2021-07-01 RX ORDER — OXYCODONE HYDROCHLORIDE 5 MG/1
1 TABLET ORAL
Qty: 5 | Refills: 0
Start: 2021-07-01

## 2021-07-01 RX ORDER — IBUPROFEN 200 MG
1 TABLET ORAL
Qty: 0 | Refills: 0 | DISCHARGE
Start: 2021-07-01

## 2021-07-01 RX ADMIN — FENTANYL CITRATE 25 MICROGRAM(S): 50 INJECTION INTRAVENOUS at 14:17

## 2021-07-01 RX ADMIN — OXYCODONE HYDROCHLORIDE 5 MILLIGRAM(S): 5 TABLET ORAL at 14:18

## 2021-07-01 NOTE — ASU DISCHARGE PLAN (ADULT/PEDIATRIC) - ASU DC SPECIAL INSTRUCTIONSFT
Return to your regular way of eating.  Resume normal activity as tolerated, but no heavy lifting or strenuous activity for 2 weeks.  No driving for next 2 weeks and/or while on narcotic pain medication.  Complete vaginal rest, no tampons, no douching, no tub bathing, no sexual activities for 2 weeks unless otherwise instructed by your doctor.  Call your doctor with any signs and symptoms of infection such as fever, chills, nausea or vomiting.  Call your doctor with redness or swelling at the incision site, fluid leakage or wound separation.  Call your doctor if you're unable to tolerate food or have difficulty urinating.  Call your doctor if you have pain that is not relieved by your prescribed medications.  Notify your doctor with any other concerns.  Follow up with Dr. Ricci.    You were given Keflex for prevention of UTI; take 1 tab twice a day for 3 days.

## 2021-07-01 NOTE — ASU DISCHARGE PLAN (ADULT/PEDIATRIC) - CARE PROVIDER_API CALL
Aileen Ricci)  Obstetrics and Gynecology  [address] [place] [address]  Phone: [phone]  Fax: [phone]  Follow Up Time:

## 2021-07-01 NOTE — ASU DISCHARGE PLAN (ADULT/PEDIATRIC) - MEDICATION INSTRUCTIONS
Take Tylenol 975mg every 6 hours and Ibuprofen 600mg every 6 years, alternating every 3 hours. Take Oxycodone for severe pain

## 2021-07-01 NOTE — CHART NOTE - NSCHARTNOTEFT_GEN_A_CORE
R1 GYN POST-OP CHECK NOTE    SUBJECTIVE:    40yo F now POD0 s/p LSC BS, RO, excision of endometriosis, enterolysis, cystoscopy.     Pt reports pain well controlled by pain meds. She has gotten out of bed and voided independently.  She is tolerating sips of clear liquids w/o N/V.  She denies fever, chills, nausea, vomiting, headache, dizziness, chest pain, palpitations, shortness of breath.    acetaminophen   Tablet .. 975 milliGRAM(s) Oral every 6 hours  fentaNYL    Injectable 25 MICROGram(s) IV Push every 5 minutes PRN  ibuprofen  Tablet. 600 milliGRAM(s) Oral every 6 hours  lactated ringers. 1000 milliLiter(s) IV Continuous <Continuous>  ondansetron Injectable 4 milliGRAM(s) IV Push once PRN  oxycodone    5 mG/acetaminophen 325 mG 1 Tablet(s) Oral once PRN  oxyCODONE    IR 5 milliGRAM(s) Oral every 6 hours PRN      OBJECTIVE:    VITAL SIGNS:  Vital Signs Last 24 Hrs  T(C): 36.6 (01 Jul 2021 12:30), Max: 37.1 (01 Jul 2021 05:44)  T(F): 97.9 (01 Jul 2021 12:30), Max: 98.7 (01 Jul 2021 05:44)  HR: 81 (01 Jul 2021 14:45) (76 - 84)  BP: 159/100 (01 Jul 2021 14:45) (101/69 - 159/100)  BP(mean): 112 (01 Jul 2021 14:45) (74 - 112)  RR: 16 (01 Jul 2021 14:45) (4 - 23)  SpO2: 99% (01 Jul 2021 14:45) (95% - 100%)  CAPILLARY BLOOD GLUCOSE          07-01-21 @ 07:01  -  07-01-21 @ 15:08  --------------------------------------------------------  IN: 210 mL / OUT: 360 mL / NET: -150 mL        PHYSICAL EXAM:  GEN: NAD, A&Ox3  CV: RRR, no m/g/r  LUNGS: CTAB  ABD: soft, appropriately tender, ND, +BS  Incision: CDI, dressings in place  EXT: WWP, no edema/TTP          ASSESSMENT:  40yo F now POD0 s/p LSC BS, RO, excision of endometriosis, enterolysis, cystoscopy.   Patient is stable and progressing normally postoperatively.    NEURO: pain well controlled on current regimen  CV: hemodyamically stable  PULM: increase incentive spirometry  GI: advance diet as tolerated; colace/mylicon prn  : continue nieto  HEME: SCDs, early ambulation, HSQ  ID: afebrile    Anupama Cadet PGY1 R1 GYN POST-OP CHECK NOTE    SUBJECTIVE:    40yo F now POD0 s/p LSC BS, RO, excision of endometriosis, enterolysis, chromotubation, cystoscopy.     Pt reports pain well controlled by pain meds. She has gotten out of bed and voided independently.  She is tolerating sips of clear liquids w/o N/V.  She denies fever, chills, nausea, vomiting, headache, dizziness, chest pain, palpitations, shortness of breath.    acetaminophen   Tablet .. 975 milliGRAM(s) Oral every 6 hours  fentaNYL    Injectable 25 MICROGram(s) IV Push every 5 minutes PRN  ibuprofen  Tablet. 600 milliGRAM(s) Oral every 6 hours  lactated ringers. 1000 milliLiter(s) IV Continuous <Continuous>  ondansetron Injectable 4 milliGRAM(s) IV Push once PRN  oxycodone    5 mG/acetaminophen 325 mG 1 Tablet(s) Oral once PRN  oxyCODONE    IR 5 milliGRAM(s) Oral every 6 hours PRN      OBJECTIVE:    VITAL SIGNS:  Vital Signs Last 24 Hrs  T(C): 36.6 (01 Jul 2021 12:30), Max: 37.1 (01 Jul 2021 05:44)  T(F): 97.9 (01 Jul 2021 12:30), Max: 98.7 (01 Jul 2021 05:44)  HR: 81 (01 Jul 2021 14:45) (76 - 84)  BP: 159/100 (01 Jul 2021 14:45) (101/69 - 159/100)  BP(mean): 112 (01 Jul 2021 14:45) (74 - 112)  RR: 16 (01 Jul 2021 14:45) (4 - 23)  SpO2: 99% (01 Jul 2021 14:45) (95% - 100%)  CAPILLARY BLOOD GLUCOSE          07-01-21 @ 07:01  -  07-01-21 @ 15:08  --------------------------------------------------------  IN: 210 mL / OUT: 360 mL / NET: -150 mL        PHYSICAL EXAM:  GEN: NAD, A&Ox3  CV: RRR, no m/g/r  LUNGS: CTAB  ABD: soft, appropriately tender, ND, +BS  Incision: Laparoscopic skin incisions c/d/i  EXT: WWP, no edema/TTP          ASSESSMENT:  40yo F now POD0 s/p LSC BS, RO, excision of endometriosis, enterolysis, chromotubation, cystoscopy.   Patient is stable and progressing normally postoperatively.    NEURO: pain well controlled  CV: hemodynamically stable  PULM: encourage OOB/Ambulation  GI: Tolerating Regular diet  : voiding spontaneously   HEME: SCDs, early ambulation  ID: afebrile  Dispo: discharge planning    Anupama Cadet PGY1

## 2021-07-01 NOTE — BRIEF OPERATIVE NOTE - SPECIMENS
Right cyst and ovary, left fallopian tube, right fallopian tube Right cysts and ovary, left fallopian tube, right fallopian tube

## 2021-07-01 NOTE — BRIEF OPERATIVE NOTE - NSICDXBRIEFPREOP_GEN_ALL_CORE_FT
PRE-OP DIAGNOSIS:  Endometrioma 01-Jul-2021 12:46:48  DiamBreanna us  Hydrosalpinx 01-Jul-2021 12:47:04  Breanna Herrera

## 2021-07-01 NOTE — BRIEF OPERATIVE NOTE - OPERATION/FINDINGS
Normal appearing uterus and left ovary; b/l hydrosalpinx L>R; right ovary with approximately 10cm endometrioma and 3cm endometrioma Normal appearing uterus and left ovary; normal upper abdomen  b/l hydrosalpinx L>R; after L Salpingectomy performed; chromotubation performed with no efflux of dye from right fallopian tube; decision made to proceed with R Salpingectomy  right ovary with approximately 10cm endometrioma and 3cm endometrioma adhered to cul-de-sac and rectum with filmy adhesions; dense adhesions to right pelvic sidewall; cyst I&D performed to gain better visualization with mild leakage of endometrioma contents into pelvis  cystoscopy performed with Fluorescein; b/l ureteral jets visualized with normal bladder contour Normal appearing uterus with small myoma on the right upper uterus close to the fallopian tube. Normal  left ovary; normal upper abdomen Large left hydrosalpinx Right fallopian tube noted inflamed by endometriosis and adhesions and no spillage with chromotubation. In addition, the intersitial part of the tube was tethered and narrow ; after L Salpingectomy performed; chromotubation performed with no efflux of dye from right fallopian tube; decision made to proceed with R Salpingectomy  right ovary with approximately 10cm endometrioma posteriorly  and 3cm endometrioma anteriorlyy adhered to cul-de-sac rectum and right pelvic side wall. Adhesions were dense adhesions to right pelvic sidewall; cyst I&D performed to gain better visualization with mild leakage of endometrioma contents into pelvis which were irrigated and suction.   cystoscopy performed with Fluorescein; b/l ureteral jets visualized with normal bladder contour

## 2021-07-01 NOTE — BRIEF OPERATIVE NOTE - NSICDXBRIEFPROCEDURE_GEN_ALL_CORE_FT
PROCEDURES:  Salpingectomy, bilateral, total, laparoscopic 01-Jul-2021 12:44:42  Diamant, Breanna  Laparoscopic oophorectomy, right 01-Jul-2021 12:44:54  Diammagen, Breanna  Laparoscopic chromotubation 01-Jul-2021 12:45:05  Diamant, Breanna  Laparoscopic incision and drainage of cyst of ovary 01-Jul-2021 12:45:45  Diammagen, Breanna  Cystoscopy 01-Jul-2021 12:46:14  Diammagen, Breanna  Lysis of pelvic adhesions 01-Jul-2021 12:46:37  Diammagen, Breanna

## 2021-07-01 NOTE — BRIEF OPERATIVE NOTE - NSICDXBRIEFPOSTOP_GEN_ALL_CORE_FT
POST-OP DIAGNOSIS:  Endometrioma 01-Jul-2021 12:47:13  Breanna Herrera  Hydrosalpinx 01-Jul-2021 12:47:20  Breanna Herrera

## 2021-07-12 LAB — SURGICAL PATHOLOGY STUDY: SIGNIFICANT CHANGE UP

## 2021-07-23 PROBLEM — R10.2 PELVIC AND PERINEAL PAIN: Chronic | Status: ACTIVE | Noted: 2021-06-15

## 2021-08-03 NOTE — ASU PREOP CHECKLIST - ANTIBIOTIC
n/a Benzoyl Peroxide Counseling: Patient counseled that medicine may cause skin irritation and bleach clothing.  In the event of skin irritation, the patient was advised to reduce the amount of the drug applied or use it less frequently.   The patient verbalized understanding of the proper use and possible adverse effects of benzoyl peroxide.  All of the patient's questions and concerns were addressed.

## 2021-08-17 ENCOUNTER — APPOINTMENT (OUTPATIENT)
Dept: HUMAN REPRODUCTION | Facility: CLINIC | Age: 41
End: 2021-08-17
Payer: COMMERCIAL

## 2021-08-17 PROCEDURE — 99214 OFFICE O/P EST MOD 30 MIN: CPT | Mod: 25

## 2021-08-17 PROCEDURE — 36415 COLL VENOUS BLD VENIPUNCTURE: CPT

## 2021-09-14 ENCOUNTER — APPOINTMENT (OUTPATIENT)
Dept: HUMAN REPRODUCTION | Facility: CLINIC | Age: 41
End: 2021-09-14
Payer: COMMERCIAL

## 2021-09-14 PROCEDURE — 99214 OFFICE O/P EST MOD 30 MIN: CPT | Mod: 95

## 2021-11-10 ENCOUNTER — APPOINTMENT (OUTPATIENT)
Dept: HUMAN REPRODUCTION | Facility: CLINIC | Age: 41
End: 2021-11-10
Payer: COMMERCIAL

## 2021-11-10 PROCEDURE — 99214 OFFICE O/P EST MOD 30 MIN: CPT | Mod: 95

## 2021-12-27 ENCOUNTER — APPOINTMENT (OUTPATIENT)
Dept: HUMAN REPRODUCTION | Facility: CLINIC | Age: 41
End: 2021-12-27

## 2021-12-28 ENCOUNTER — APPOINTMENT (OUTPATIENT)
Dept: HUMAN REPRODUCTION | Facility: CLINIC | Age: 41
End: 2021-12-28

## 2022-01-23 ENCOUNTER — APPOINTMENT (OUTPATIENT)
Dept: HUMAN REPRODUCTION | Facility: CLINIC | Age: 42
End: 2022-01-23

## 2022-01-25 ENCOUNTER — APPOINTMENT (OUTPATIENT)
Dept: HUMAN REPRODUCTION | Facility: CLINIC | Age: 42
End: 2022-01-25
Payer: COMMERCIAL

## 2022-01-25 PROCEDURE — 76831 ECHO EXAM UTERUS: CPT

## 2022-01-25 PROCEDURE — 58340 CATHETER FOR HYSTEROGRAPHY: CPT

## 2022-02-21 ENCOUNTER — OUTPATIENT (OUTPATIENT)
Dept: OUTPATIENT SERVICES | Facility: HOSPITAL | Age: 42
LOS: 1 days | End: 2022-02-21
Payer: COMMERCIAL

## 2022-02-21 ENCOUNTER — APPOINTMENT (OUTPATIENT)
Dept: MAMMOGRAPHY | Facility: IMAGING CENTER | Age: 42
End: 2022-02-21
Payer: COMMERCIAL

## 2022-02-21 DIAGNOSIS — Z00.8 ENCOUNTER FOR OTHER GENERAL EXAMINATION: ICD-10-CM

## 2022-02-21 PROCEDURE — 77063 BREAST TOMOSYNTHESIS BI: CPT

## 2022-02-21 PROCEDURE — 77067 SCR MAMMO BI INCL CAD: CPT

## 2022-02-21 PROCEDURE — 77063 BREAST TOMOSYNTHESIS BI: CPT | Mod: 26

## 2022-02-21 PROCEDURE — 77067 SCR MAMMO BI INCL CAD: CPT | Mod: 26

## 2022-03-07 ENCOUNTER — APPOINTMENT (OUTPATIENT)
Dept: ULTRASOUND IMAGING | Facility: IMAGING CENTER | Age: 42
End: 2022-03-07
Payer: COMMERCIAL

## 2022-03-07 ENCOUNTER — APPOINTMENT (OUTPATIENT)
Dept: MAMMOGRAPHY | Facility: IMAGING CENTER | Age: 42
End: 2022-03-07
Payer: COMMERCIAL

## 2022-03-07 ENCOUNTER — OUTPATIENT (OUTPATIENT)
Dept: OUTPATIENT SERVICES | Facility: HOSPITAL | Age: 42
LOS: 1 days | End: 2022-03-07
Payer: COMMERCIAL

## 2022-03-07 DIAGNOSIS — Z00.8 ENCOUNTER FOR OTHER GENERAL EXAMINATION: ICD-10-CM

## 2022-03-07 PROCEDURE — 77065 DX MAMMO INCL CAD UNI: CPT | Mod: 26,LT

## 2022-03-07 PROCEDURE — G0279: CPT

## 2022-03-07 PROCEDURE — G0279: CPT | Mod: 26

## 2022-03-07 PROCEDURE — 76642 ULTRASOUND BREAST LIMITED: CPT

## 2022-03-07 PROCEDURE — 77065 DX MAMMO INCL CAD UNI: CPT

## 2022-03-07 PROCEDURE — 76642 ULTRASOUND BREAST LIMITED: CPT | Mod: 26,LT

## 2022-03-11 ENCOUNTER — APPOINTMENT (OUTPATIENT)
Dept: HUMAN REPRODUCTION | Facility: CLINIC | Age: 42
End: 2022-03-11
Payer: COMMERCIAL

## 2022-03-11 PROCEDURE — 99213 OFFICE O/P EST LOW 20 MIN: CPT | Mod: 25

## 2022-03-11 PROCEDURE — 36415 COLL VENOUS BLD VENIPUNCTURE: CPT

## 2022-03-11 PROCEDURE — 76830 TRANSVAGINAL US NON-OB: CPT

## 2022-03-18 ENCOUNTER — APPOINTMENT (OUTPATIENT)
Dept: HUMAN REPRODUCTION | Facility: CLINIC | Age: 42
End: 2022-03-18

## 2022-04-19 ENCOUNTER — APPOINTMENT (OUTPATIENT)
Dept: HUMAN REPRODUCTION | Facility: CLINIC | Age: 42
End: 2022-04-19
Payer: COMMERCIAL

## 2022-04-19 PROCEDURE — 36415 COLL VENOUS BLD VENIPUNCTURE: CPT

## 2022-04-19 PROCEDURE — 76830 TRANSVAGINAL US NON-OB: CPT

## 2022-04-19 PROCEDURE — 99213Y: CUSTOM | Mod: 25

## 2022-04-26 ENCOUNTER — APPOINTMENT (OUTPATIENT)
Dept: HUMAN REPRODUCTION | Facility: CLINIC | Age: 42
End: 2022-04-26
Payer: COMMERCIAL

## 2022-04-26 PROCEDURE — 36415 COLL VENOUS BLD VENIPUNCTURE: CPT

## 2022-04-26 PROCEDURE — 76830 TRANSVAGINAL US NON-OB: CPT

## 2022-04-26 PROCEDURE — 99213Y: CUSTOM | Mod: 25

## 2022-04-29 ENCOUNTER — NON-APPOINTMENT (OUTPATIENT)
Age: 42
End: 2022-04-29

## 2022-04-29 ENCOUNTER — APPOINTMENT (OUTPATIENT)
Dept: HUMAN REPRODUCTION | Facility: CLINIC | Age: 42
End: 2022-04-29
Payer: COMMERCIAL

## 2022-04-29 PROCEDURE — 99213Y: CUSTOM | Mod: 25

## 2022-04-29 PROCEDURE — 36415 COLL VENOUS BLD VENIPUNCTURE: CPT

## 2022-04-29 PROCEDURE — 76830 TRANSVAGINAL US NON-OB: CPT

## 2022-05-01 ENCOUNTER — APPOINTMENT (OUTPATIENT)
Dept: HUMAN REPRODUCTION | Facility: CLINIC | Age: 42
End: 2022-05-01
Payer: COMMERCIAL

## 2022-05-01 PROCEDURE — 76830 TRANSVAGINAL US NON-OB: CPT

## 2022-05-01 PROCEDURE — 36415 COLL VENOUS BLD VENIPUNCTURE: CPT

## 2022-05-01 PROCEDURE — 99213 OFFICE O/P EST LOW 20 MIN: CPT | Mod: 25

## 2022-05-03 ENCOUNTER — APPOINTMENT (OUTPATIENT)
Dept: HUMAN REPRODUCTION | Facility: CLINIC | Age: 42
End: 2022-05-03
Payer: COMMERCIAL

## 2022-05-03 PROCEDURE — 76830 TRANSVAGINAL US NON-OB: CPT

## 2022-05-03 PROCEDURE — 36415 COLL VENOUS BLD VENIPUNCTURE: CPT

## 2022-05-03 PROCEDURE — 99213Y: CUSTOM | Mod: 25

## 2022-05-04 ENCOUNTER — APPOINTMENT (OUTPATIENT)
Dept: HUMAN REPRODUCTION | Facility: CLINIC | Age: 42
End: 2022-05-04
Payer: COMMERCIAL

## 2022-05-04 PROCEDURE — 76830 TRANSVAGINAL US NON-OB: CPT

## 2022-05-04 PROCEDURE — 99213Y: CUSTOM | Mod: 25

## 2022-05-04 PROCEDURE — 36415 COLL VENOUS BLD VENIPUNCTURE: CPT

## 2022-05-05 ENCOUNTER — APPOINTMENT (OUTPATIENT)
Dept: HUMAN REPRODUCTION | Facility: CLINIC | Age: 42
End: 2022-05-05
Payer: COMMERCIAL

## 2022-05-05 PROCEDURE — 99213Y: CUSTOM | Mod: 25

## 2022-05-05 PROCEDURE — 76830 TRANSVAGINAL US NON-OB: CPT

## 2022-05-05 PROCEDURE — 36415 COLL VENOUS BLD VENIPUNCTURE: CPT

## 2022-05-06 ENCOUNTER — APPOINTMENT (OUTPATIENT)
Dept: HUMAN REPRODUCTION | Facility: CLINIC | Age: 42
End: 2022-05-06
Payer: COMMERCIAL

## 2022-05-06 PROCEDURE — 36415 COLL VENOUS BLD VENIPUNCTURE: CPT

## 2022-05-07 ENCOUNTER — APPOINTMENT (OUTPATIENT)
Dept: HUMAN REPRODUCTION | Facility: CLINIC | Age: 42
End: 2022-05-07
Payer: COMMERCIAL

## 2022-05-07 PROCEDURE — 89250 CULTR OOCYTE/EMBRYO <4 DAYS: CPT

## 2022-05-07 PROCEDURE — 76948 ECHO GUIDE OVA ASPIRATION: CPT

## 2022-05-07 PROCEDURE — 89254 OOCYTE IDENTIFICATION: CPT

## 2022-05-07 PROCEDURE — 89268 INSEMINATION OF OOCYTES: CPT

## 2022-05-07 PROCEDURE — 89261 SPERM ISOLATION COMPLEX: CPT

## 2022-05-07 PROCEDURE — 58970 RETRIEVAL OF OOCYTE: CPT

## 2022-05-10 PROCEDURE — 89253 EMBRYO HATCHING: CPT

## 2022-05-12 PROCEDURE — 89290 BIOPSY OOCYTE POLAR BODY <=5: CPT

## 2022-05-12 PROCEDURE — 89272 EXTENDED CULTURE OF OOCYTES: CPT

## 2022-05-12 PROCEDURE — 89258 CRYOPRESERVATION EMBRYO(S): CPT

## 2022-05-13 PROCEDURE — 89258 CRYOPRESERVATION EMBRYO(S): CPT

## 2022-05-13 PROCEDURE — 89290 BIOPSY OOCYTE POLAR BODY <=5: CPT

## 2022-05-13 PROCEDURE — 89342 STORAGE/YEAR EMBRYO(S): CPT

## 2022-05-23 ENCOUNTER — NON-APPOINTMENT (OUTPATIENT)
Age: 42
End: 2022-05-23

## 2022-05-23 ENCOUNTER — APPOINTMENT (OUTPATIENT)
Dept: OPHTHALMOLOGY | Facility: CLINIC | Age: 42
End: 2022-05-23
Payer: COMMERCIAL

## 2022-05-23 PROCEDURE — 92004 COMPRE OPH EXAM NEW PT 1/>: CPT

## 2022-05-23 PROCEDURE — 92015 DETERMINE REFRACTIVE STATE: CPT

## 2022-06-08 ENCOUNTER — APPOINTMENT (OUTPATIENT)
Dept: HUMAN REPRODUCTION | Facility: CLINIC | Age: 42
End: 2022-06-08
Payer: COMMERCIAL

## 2022-06-08 PROCEDURE — 99214 OFFICE O/P EST MOD 30 MIN: CPT | Mod: 95

## 2022-06-24 ENCOUNTER — APPOINTMENT (OUTPATIENT)
Dept: HUMAN REPRODUCTION | Facility: CLINIC | Age: 42
End: 2022-06-24
Payer: COMMERCIAL

## 2022-06-24 PROCEDURE — 76830 TRANSVAGINAL US NON-OB: CPT

## 2022-06-24 PROCEDURE — 36415 COLL VENOUS BLD VENIPUNCTURE: CPT

## 2022-06-24 PROCEDURE — 99213Y: CUSTOM | Mod: 25

## 2022-07-01 ENCOUNTER — APPOINTMENT (OUTPATIENT)
Dept: HUMAN REPRODUCTION | Facility: CLINIC | Age: 42
End: 2022-07-01

## 2022-07-01 ENCOUNTER — RESULT REVIEW (OUTPATIENT)
Age: 42
End: 2022-07-01

## 2022-07-01 PROCEDURE — 36415 COLL VENOUS BLD VENIPUNCTURE: CPT

## 2022-07-01 PROCEDURE — 99213Y: CUSTOM | Mod: 25

## 2022-07-01 PROCEDURE — 76830 TRANSVAGINAL US NON-OB: CPT

## 2022-07-08 ENCOUNTER — APPOINTMENT (OUTPATIENT)
Dept: HUMAN REPRODUCTION | Facility: CLINIC | Age: 42
End: 2022-07-08

## 2022-07-08 PROCEDURE — 36415 COLL VENOUS BLD VENIPUNCTURE: CPT

## 2022-07-08 PROCEDURE — 99213Y: CUSTOM | Mod: 25

## 2022-07-08 PROCEDURE — 76830 TRANSVAGINAL US NON-OB: CPT

## 2022-07-15 ENCOUNTER — APPOINTMENT (OUTPATIENT)
Dept: HUMAN REPRODUCTION | Facility: CLINIC | Age: 42
End: 2022-07-15

## 2022-07-15 PROCEDURE — 89255 PREPARE EMBRYO FOR TRANSFER: CPT

## 2022-07-15 PROCEDURE — 89352 THAWING CRYOPRESRVED EMBRYO: CPT

## 2022-07-15 PROCEDURE — 58974 EMBRYO TRANSFER INTRAUTERINE: CPT

## 2022-07-15 PROCEDURE — 76705 ECHO EXAM OF ABDOMEN: CPT

## 2022-07-25 ENCOUNTER — APPOINTMENT (OUTPATIENT)
Dept: HUMAN REPRODUCTION | Facility: CLINIC | Age: 42
End: 2022-07-25

## 2022-07-25 PROCEDURE — 36415 COLL VENOUS BLD VENIPUNCTURE: CPT

## 2022-08-16 ENCOUNTER — APPOINTMENT (OUTPATIENT)
Dept: HUMAN REPRODUCTION | Facility: CLINIC | Age: 42
End: 2022-08-16

## 2022-08-16 PROCEDURE — 96372 THER/PROPH/DIAG INJ SC/IM: CPT

## 2022-08-24 NOTE — BRIEF OPERATIVE NOTE - TYPE OF ANESTHESIA
1240- Patient requested epidural. Anesthesia notified.     1310- RN and anesthesia at bedside for epidural procedure. Timeout performed at 1323, procedure completed at 1340 without complication. Alfonso catheter inserted by nursing instructor and nursing student at 1400. Patient reporting comfort.     General

## 2022-09-16 ENCOUNTER — APPOINTMENT (OUTPATIENT)
Dept: HUMAN REPRODUCTION | Facility: CLINIC | Age: 42
End: 2022-09-16

## 2022-09-16 PROCEDURE — 96372 THER/PROPH/DIAG INJ SC/IM: CPT

## 2022-10-05 ENCOUNTER — APPOINTMENT (OUTPATIENT)
Dept: ULTRASOUND IMAGING | Facility: IMAGING CENTER | Age: 42
End: 2022-10-05

## 2022-10-17 ENCOUNTER — APPOINTMENT (OUTPATIENT)
Dept: HUMAN REPRODUCTION | Facility: CLINIC | Age: 42
End: 2022-10-17

## 2022-10-19 NOTE — ED PROVIDER NOTE - OBJECTIVE STATEMENT
Abdomen , soft, nontender, nondistended , no guarding or rigidity , no masses palpable , normal bowel sounds , Liver and Spleen,  no hepatosplenomegaly , liver nontender
40 y/o female with a hx of PCOS and HLD presents to the ER c/o 1 week of left sided pelvic pain and 1 day of diarrhea.  Pt denies fevers, chills, nausea, vomiting, vaginal bleeding, vaginal discharge, dysuria, frequency, hematuria, weakness, dizziness.

## 2022-12-09 ENCOUNTER — APPOINTMENT (OUTPATIENT)
Dept: HUMAN REPRODUCTION | Facility: CLINIC | Age: 42
End: 2022-12-09

## 2022-12-09 PROCEDURE — 96372 THER/PROPH/DIAG INJ SC/IM: CPT

## 2023-01-06 ENCOUNTER — NON-APPOINTMENT (OUTPATIENT)
Age: 43
End: 2023-01-06

## 2023-01-14 ENCOUNTER — APPOINTMENT (OUTPATIENT)
Dept: HUMAN REPRODUCTION | Facility: CLINIC | Age: 43
End: 2023-01-14
Payer: COMMERCIAL

## 2023-01-14 PROCEDURE — 96372 THER/PROPH/DIAG INJ SC/IM: CPT

## 2023-02-14 ENCOUNTER — APPOINTMENT (OUTPATIENT)
Dept: HUMAN REPRODUCTION | Facility: CLINIC | Age: 43
End: 2023-02-14
Payer: COMMERCIAL

## 2023-02-14 PROCEDURE — 36415 COLL VENOUS BLD VENIPUNCTURE: CPT

## 2023-02-14 PROCEDURE — 76857 US EXAM PELVIC LIMITED: CPT

## 2023-02-14 PROCEDURE — 99213 OFFICE O/P EST LOW 20 MIN: CPT | Mod: 25

## 2023-02-22 ENCOUNTER — APPOINTMENT (OUTPATIENT)
Dept: HUMAN REPRODUCTION | Facility: CLINIC | Age: 43
End: 2023-02-22
Payer: COMMERCIAL

## 2023-02-22 PROCEDURE — 99213 OFFICE O/P EST LOW 20 MIN: CPT | Mod: 25

## 2023-02-22 PROCEDURE — 76857 US EXAM PELVIC LIMITED: CPT

## 2023-02-22 PROCEDURE — 36415 COLL VENOUS BLD VENIPUNCTURE: CPT

## 2023-02-24 ENCOUNTER — APPOINTMENT (OUTPATIENT)
Dept: HUMAN REPRODUCTION | Facility: CLINIC | Age: 43
End: 2023-02-24
Payer: COMMERCIAL

## 2023-02-24 PROCEDURE — 36415 COLL VENOUS BLD VENIPUNCTURE: CPT

## 2023-02-24 PROCEDURE — 76857 US EXAM PELVIC LIMITED: CPT

## 2023-02-24 PROCEDURE — 99213 OFFICE O/P EST LOW 20 MIN: CPT | Mod: 25

## 2023-03-03 ENCOUNTER — APPOINTMENT (OUTPATIENT)
Dept: HUMAN REPRODUCTION | Facility: CLINIC | Age: 43
End: 2023-03-03
Payer: COMMERCIAL

## 2023-03-03 PROCEDURE — 89255 PREPARE EMBRYO FOR TRANSFER: CPT

## 2023-03-03 PROCEDURE — 89352 THAWING CRYOPRESRVED EMBRYO: CPT

## 2023-03-03 PROCEDURE — 58974 EMBRYO TRANSFER INTRAUTERINE: CPT

## 2023-03-03 PROCEDURE — 89398A: CUSTOM

## 2023-03-03 PROCEDURE — 76998 US GUIDE INTRAOP: CPT

## 2023-03-04 ENCOUNTER — APPOINTMENT (OUTPATIENT)
Dept: HUMAN REPRODUCTION | Facility: CLINIC | Age: 43
End: 2023-03-04
Payer: COMMERCIAL

## 2023-03-14 ENCOUNTER — APPOINTMENT (OUTPATIENT)
Dept: HUMAN REPRODUCTION | Facility: CLINIC | Age: 43
End: 2023-03-14
Payer: COMMERCIAL

## 2023-03-14 PROCEDURE — 36415 COLL VENOUS BLD VENIPUNCTURE: CPT

## 2023-03-16 ENCOUNTER — APPOINTMENT (OUTPATIENT)
Dept: HUMAN REPRODUCTION | Facility: CLINIC | Age: 43
End: 2023-03-16
Payer: COMMERCIAL

## 2023-03-16 PROCEDURE — 36415 COLL VENOUS BLD VENIPUNCTURE: CPT

## 2023-03-24 ENCOUNTER — APPOINTMENT (OUTPATIENT)
Dept: HUMAN REPRODUCTION | Facility: CLINIC | Age: 43
End: 2023-03-24
Payer: COMMERCIAL

## 2023-03-24 PROCEDURE — 99213 OFFICE O/P EST LOW 20 MIN: CPT

## 2023-03-24 PROCEDURE — 76817 TRANSVAGINAL US OBSTETRIC: CPT

## 2023-03-24 PROCEDURE — 36415 COLL VENOUS BLD VENIPUNCTURE: CPT

## 2023-03-31 ENCOUNTER — APPOINTMENT (OUTPATIENT)
Dept: HUMAN REPRODUCTION | Facility: CLINIC | Age: 43
End: 2023-03-31
Payer: COMMERCIAL

## 2023-03-31 PROCEDURE — 99213 OFFICE O/P EST LOW 20 MIN: CPT | Mod: 25

## 2023-03-31 PROCEDURE — 76817 TRANSVAGINAL US OBSTETRIC: CPT

## 2023-05-01 ENCOUNTER — NON-APPOINTMENT (OUTPATIENT)
Age: 43
End: 2023-05-01

## 2023-05-16 ENCOUNTER — APPOINTMENT (OUTPATIENT)
Dept: ANTEPARTUM | Facility: CLINIC | Age: 43
End: 2023-05-16
Payer: COMMERCIAL

## 2023-05-16 ENCOUNTER — ASOB RESULT (OUTPATIENT)
Age: 43
End: 2023-05-16

## 2023-05-16 ENCOUNTER — APPOINTMENT (OUTPATIENT)
Dept: MATERNAL FETAL MEDICINE | Facility: CLINIC | Age: 43
End: 2023-05-16

## 2023-05-16 PROCEDURE — 76813 OB US NUCHAL MEAS 1 GEST: CPT | Mod: 59

## 2023-05-16 PROCEDURE — 76801 OB US < 14 WKS SINGLE FETUS: CPT

## 2023-05-23 ENCOUNTER — APPOINTMENT (OUTPATIENT)
Dept: DERMATOLOGY | Facility: CLINIC | Age: 43
End: 2023-05-23

## 2023-07-06 ENCOUNTER — APPOINTMENT (OUTPATIENT)
Dept: ANTEPARTUM | Facility: CLINIC | Age: 43
End: 2023-07-06
Payer: COMMERCIAL

## 2023-07-06 ENCOUNTER — ASOB RESULT (OUTPATIENT)
Age: 43
End: 2023-07-06

## 2023-07-06 PROCEDURE — 76811 OB US DETAILED SNGL FETUS: CPT

## 2023-07-11 ENCOUNTER — APPOINTMENT (OUTPATIENT)
Dept: PEDIATRIC CARDIOLOGY | Facility: CLINIC | Age: 43
End: 2023-07-11
Payer: COMMERCIAL

## 2023-07-11 PROCEDURE — 76827 ECHO EXAM OF FETAL HEART: CPT

## 2023-07-11 PROCEDURE — 76825 ECHO EXAM OF FETAL HEART: CPT

## 2023-07-11 PROCEDURE — 76820 UMBILICAL ARTERY ECHO: CPT

## 2023-07-11 PROCEDURE — 99242 OFF/OP CONSLTJ NEW/EST SF 20: CPT

## 2023-07-11 PROCEDURE — 93325 DOPPLER ECHO COLOR FLOW MAPG: CPT | Mod: 59

## 2023-07-28 ENCOUNTER — ASOB RESULT (OUTPATIENT)
Age: 43
End: 2023-07-28

## 2023-07-28 ENCOUNTER — APPOINTMENT (OUTPATIENT)
Dept: MATERNAL FETAL MEDICINE | Facility: CLINIC | Age: 43
End: 2023-07-28
Payer: COMMERCIAL

## 2023-07-28 DIAGNOSIS — O24.419 GESTATIONAL DIABETES MELLITUS IN PREGNANCY, UNSPECIFIED CONTROL: ICD-10-CM

## 2023-07-28 PROCEDURE — G0109 DIAB MANAGE TRN IND/GROUP: CPT | Mod: 95

## 2023-08-01 ENCOUNTER — APPOINTMENT (OUTPATIENT)
Dept: PULMONOLOGY | Facility: CLINIC | Age: 43
End: 2023-08-01
Payer: COMMERCIAL

## 2023-08-01 VITALS
WEIGHT: 215 LBS | HEART RATE: 98 BPM | OXYGEN SATURATION: 97 % | DIASTOLIC BLOOD PRESSURE: 72 MMHG | HEIGHT: 65 IN | BODY MASS INDEX: 35.82 KG/M2 | SYSTOLIC BLOOD PRESSURE: 107 MMHG

## 2023-08-01 LAB — POCT - HEMOGLOBIN (HGB), QUANTITATIVE, TRANSCUTANEOUS: 11.5

## 2023-08-01 PROCEDURE — 94010 BREATHING CAPACITY TEST: CPT

## 2023-08-01 PROCEDURE — 94729 DIFFUSING CAPACITY: CPT

## 2023-08-01 PROCEDURE — ZZZZZ: CPT

## 2023-08-01 PROCEDURE — 94618 PULMONARY STRESS TESTING: CPT

## 2023-08-01 PROCEDURE — 88738 HGB QUANT TRANSCUTANEOUS: CPT

## 2023-08-01 PROCEDURE — 94727 GAS DIL/WSHOT DETER LNG VOL: CPT

## 2023-08-01 PROCEDURE — 99203 OFFICE O/P NEW LOW 30 MIN: CPT | Mod: 25

## 2023-08-02 RX ORDER — ATORVASTATIN CALCIUM 20 MG/1
20 TABLET, FILM COATED ORAL
Refills: 0 | Status: DISCONTINUED | COMMUNITY
End: 2023-08-02

## 2023-08-02 RX ORDER — NORETHINDRONE ACETATE AND ETHINYL ESTRADIOL AND FERROUS FUMARATE 1MG-20(21)
1-20 KIT ORAL
Qty: 3 | Refills: 3 | Status: DISCONTINUED | COMMUNITY
Start: 2020-05-13 | End: 2023-08-02

## 2023-08-02 NOTE — PHYSICAL EXAM
[No Acute Distress] : no acute distress [Normal Oropharynx] : normal oropharynx [Normal Appearance] : normal appearance [No Neck Mass] : no neck mass [Normal Rate/Rhythm] : normal rate/rhythm [Normal S1, S2] : normal s1, s2 [No Murmurs] : no murmurs [No Resp Distress] : no resp distress [Clear to Auscultation Bilaterally] : clear to auscultation bilaterally [No Abnormalities] : no abnormalities [Benign] : benign [Normal Gait] : normal gait [No Clubbing] : no clubbing [No Cyanosis] : no cyanosis [No Edema] : no edema [FROM] : FROM [Normal Color/ Pigmentation] : normal color/ pigmentation [No Focal Deficits] : no focal deficits [Oriented x3] : oriented x3 [Normal Affect] : normal affect [TextBox_2] : Overweight female

## 2023-08-02 NOTE — PROCEDURE
[FreeTextEntry1] : PFT demonstrates a mild restrictive pattern.  There is a mild gas exchange impairment that corrects for volume.  Pulmonary stress test demonstrates no oxygen desaturation Low Dose Naltrexone Pregnancy And Lactation Text: Naltrexone is pregnancy category C.  There have been no adequate and well-controlled studies in pregnant women.  It should be used in pregnancy only if the potential benefit justifies the potential risk to the fetus.   Limited data indicates that naltrexone is minimally excreted into breastmilk.

## 2023-08-02 NOTE — ASSESSMENT
[FreeTextEntry1] : Shortness of breath associated with pregnancy.  PFTs show restrictive pattern likely related to underlying obesity.  She does not demonstrate oxygen desaturation with exertion.  I suspect this is dyspnea of pregnancy.  I did give her an albuterol inhaler prescription to try on an as-needed basis and she will get back to me if she has a consistent response to the medication otherwise we will continue to observe through the pregnancy

## 2023-08-02 NOTE — HISTORY OF PRESENT ILLNESS
[TextBox_4] : 43-year-old woman referred for shortness of breath during pregnancy.  She developed shortness of breath at the onset of her pregnancy.  She continues to be short of breath throughout the entire pregnancy.  She has not gained significant weight.  She has no preceding pulmonary history.  Shortness of breath is only noted with exertion.  She does not complain of cough congestion or wheezing.

## 2023-08-07 ENCOUNTER — APPOINTMENT (OUTPATIENT)
Dept: MATERNAL FETAL MEDICINE | Facility: CLINIC | Age: 43
End: 2023-08-07
Payer: COMMERCIAL

## 2023-08-07 ENCOUNTER — NON-APPOINTMENT (OUTPATIENT)
Age: 43
End: 2023-08-07

## 2023-08-07 ENCOUNTER — APPOINTMENT (OUTPATIENT)
Dept: CARDIOLOGY | Facility: CLINIC | Age: 43
End: 2023-08-07
Payer: COMMERCIAL

## 2023-08-07 ENCOUNTER — ASOB RESULT (OUTPATIENT)
Age: 43
End: 2023-08-07

## 2023-08-07 VITALS
DIASTOLIC BLOOD PRESSURE: 83 MMHG | OXYGEN SATURATION: 98 % | WEIGHT: 217 LBS | BODY MASS INDEX: 36.15 KG/M2 | HEART RATE: 104 BPM | SYSTOLIC BLOOD PRESSURE: 119 MMHG | HEIGHT: 65 IN

## 2023-08-07 DIAGNOSIS — O24.414 GESTATIONAL DIABETES MELLITUS IN PREGNANCY, INSULIN CONTROLLED: ICD-10-CM

## 2023-08-07 PROCEDURE — 93000 ELECTROCARDIOGRAM COMPLETE: CPT

## 2023-08-07 PROCEDURE — G0108 DIAB MANAGE TRN  PER INDIV: CPT | Mod: 95

## 2023-08-07 PROCEDURE — 99203 OFFICE O/P NEW LOW 30 MIN: CPT

## 2023-08-07 NOTE — HISTORY OF PRESENT ILLNESS
[FreeTextEntry1] : 43 year old woman  who is 24 weeks pregnant (MEENA 23) who comes in for evaluation of increasing dyspnea. She carries a history of HLD- was on Lipitor 20 mg - prior to the pregnancy- but she has not taken since pregnant. Also recently diagnosed with GDM- Just told- and starting on insulin.  She noticed the shortness of breath at the start of pregnancy and has progressed since that time.  She saw pulmonologist who gave her a Ventolin pump to use as needed. EKG normal No significant FH No edema no chest pain EKG normal

## 2023-08-07 NOTE — DISCUSSION/SUMMARY
[FreeTextEntry1] : 43 year old woman  who is 24 weeks pregnant (MEENA 23) who comes in for evaluation of increasing dyspnea BP normal Given symptoms will check TTE FU thereafter [EKG obtained to assist in diagnosis and management of assessed problem(s)] : EKG obtained to assist in diagnosis and management of assessed problem(s)

## 2023-08-15 ENCOUNTER — ASOB RESULT (OUTPATIENT)
Age: 43
End: 2023-08-15

## 2023-08-15 ENCOUNTER — APPOINTMENT (OUTPATIENT)
Dept: MATERNAL FETAL MEDICINE | Facility: CLINIC | Age: 43
End: 2023-08-15
Payer: COMMERCIAL

## 2023-08-15 PROCEDURE — G0108 DIAB MANAGE TRN  PER INDIV: CPT | Mod: 95

## 2023-08-28 ENCOUNTER — EMERGENCY (EMERGENCY)
Facility: HOSPITAL | Age: 43
LOS: 1 days | Discharge: HOME | End: 2023-08-28
Attending: STUDENT IN AN ORGANIZED HEALTH CARE EDUCATION/TRAINING PROGRAM | Admitting: EMERGENCY MEDICINE
Payer: COMMERCIAL

## 2023-08-28 VITALS
OXYGEN SATURATION: 100 % | SYSTOLIC BLOOD PRESSURE: 129 MMHG | RESPIRATION RATE: 18 BRPM | DIASTOLIC BLOOD PRESSURE: 79 MMHG | HEART RATE: 100 BPM | TEMPERATURE: 98 F

## 2023-08-28 VITALS
DIASTOLIC BLOOD PRESSURE: 60 MMHG | SYSTOLIC BLOOD PRESSURE: 113 MMHG | RESPIRATION RATE: 17 BRPM | TEMPERATURE: 98 F | HEART RATE: 100 BPM | OXYGEN SATURATION: 100 %

## 2023-08-28 LAB
ALBUMIN SERPL ELPH-MCNC: 3.7 G/DL — SIGNIFICANT CHANGE UP (ref 3.3–5)
ALP SERPL-CCNC: 122 U/L — HIGH (ref 40–120)
ALT FLD-CCNC: 13 U/L — SIGNIFICANT CHANGE UP (ref 4–33)
ANION GAP SERPL CALC-SCNC: 14 MMOL/L — SIGNIFICANT CHANGE UP (ref 7–14)
AST SERPL-CCNC: 15 U/L — SIGNIFICANT CHANGE UP (ref 4–32)
BILIRUB SERPL-MCNC: 0.2 MG/DL — SIGNIFICANT CHANGE UP (ref 0.2–1.2)
BUN SERPL-MCNC: 9 MG/DL — SIGNIFICANT CHANGE UP (ref 7–23)
CALCIUM SERPL-MCNC: 9.6 MG/DL — SIGNIFICANT CHANGE UP (ref 8.4–10.5)
CHLORIDE SERPL-SCNC: 101 MMOL/L — SIGNIFICANT CHANGE UP (ref 98–107)
CO2 SERPL-SCNC: 22 MMOL/L — SIGNIFICANT CHANGE UP (ref 22–31)
CREAT SERPL-MCNC: 0.7 MG/DL — SIGNIFICANT CHANGE UP (ref 0.5–1.3)
EGFR: 111 ML/MIN/1.73M2 — SIGNIFICANT CHANGE UP
GLUCOSE SERPL-MCNC: 68 MG/DL — LOW (ref 70–99)
HCT VFR BLD CALC: 37.1 % — SIGNIFICANT CHANGE UP (ref 34.5–45)
HGB BLD-MCNC: 11.7 G/DL — SIGNIFICANT CHANGE UP (ref 11.5–15.5)
MCHC RBC-ENTMCNC: 26.8 PG — LOW (ref 27–34)
MCHC RBC-ENTMCNC: 31.5 GM/DL — LOW (ref 32–36)
MCV RBC AUTO: 84.9 FL — SIGNIFICANT CHANGE UP (ref 80–100)
NRBC # BLD: 0 /100 WBCS — SIGNIFICANT CHANGE UP (ref 0–0)
NRBC # FLD: 0 K/UL — SIGNIFICANT CHANGE UP (ref 0–0)
NT-PROBNP SERPL-SCNC: <36 PG/ML — SIGNIFICANT CHANGE UP
PLATELET # BLD AUTO: 382 K/UL — SIGNIFICANT CHANGE UP (ref 150–400)
POTASSIUM SERPL-MCNC: 4.3 MMOL/L — SIGNIFICANT CHANGE UP (ref 3.5–5.3)
POTASSIUM SERPL-SCNC: 4.3 MMOL/L — SIGNIFICANT CHANGE UP (ref 3.5–5.3)
PROT SERPL-MCNC: 7.7 G/DL — SIGNIFICANT CHANGE UP (ref 6–8.3)
RBC # BLD: 4.37 M/UL — SIGNIFICANT CHANGE UP (ref 3.8–5.2)
RBC # FLD: 13.8 % — SIGNIFICANT CHANGE UP (ref 10.3–14.5)
SODIUM SERPL-SCNC: 137 MMOL/L — SIGNIFICANT CHANGE UP (ref 135–145)
TROPONIN T, HIGH SENSITIVITY RESULT: 7 NG/L — SIGNIFICANT CHANGE UP
WBC # BLD: 14.95 K/UL — HIGH (ref 3.8–10.5)
WBC # FLD AUTO: 14.95 K/UL — HIGH (ref 3.8–10.5)

## 2023-08-28 PROCEDURE — 71275 CT ANGIOGRAPHY CHEST: CPT | Mod: 26,MA

## 2023-08-28 PROCEDURE — 93010 ELECTROCARDIOGRAM REPORT: CPT

## 2023-08-28 PROCEDURE — 99285 EMERGENCY DEPT VISIT HI MDM: CPT

## 2023-08-28 NOTE — ED PROVIDER NOTE - NSFOLLOWUPINSTRUCTIONS_ED_ALL_ED_FT
You were seen in the ED for for shortness of breath.      We obtained labs which shows that you are not having any heart issues and we obtained a CT of your lungs which shows that you do not have any blood clots or any infection at this time.    It was determined that you have no life threatening injuries or condition that requires you to be admitted to the hospital or have any additional testing while in the ED.     Attached are your results from todays visit.     Please take these results to follow up with your primary care doctor so that they can determine if you need any additional testing or treatment as an outpatient.     If your symptoms worsen or change, you can return to the ED for further evaluation and care at that time.

## 2023-08-28 NOTE — ED ADULT NURSE NOTE - OBJECTIVE STATEMENT
Received pt in intake. Pt is A&O x4. Past medical history of newly dx of Gestational DM. Pt is 28 weeks Pregnant, this is her first pregnancy. Pt came to the ED due to SOB. Pt stated that this started at the bringing of her pregnancy and believed that this was normal. Pt called her OB and was told to come to the ED. Pt stated the SOB is worse when she over exerts herself. When she is lying down the SOB is a little better. Pt has B/L lower extremity swelling. pt breathing is equal and nonlabored. Pt denies any chest pain, Headache, nausea, vomiting, diarrhea, fever or chills. Pt has a 18g tot he Left AC. Pt safety maintained.

## 2023-08-28 NOTE — ED PROVIDER NOTE - OBJECTIVE STATEMENT
42 yo F who is currently 28 weeks pregnant who presents to the ED with shortness of breath. She has been having shortness of breath since she first began pregnant. Saw pulmonology about 1 month ago and they thought it was just due to pregnancy but symptoms are getting worse. Saw her ObGYn today who referred her here for CT PE to rule out pulmonary embolism. No chest pain, LE edema, fever, chills, n/v/d/

## 2023-08-28 NOTE — ED PROVIDER NOTE - NSICDXFAMILYHX_GEN_ALL_CORE_FT
FAMILY HISTORY:  Father  Still living? Unknown  Family history of hyperlipidemia, Age at diagnosis: Age Unknown    Mother  Still living? Unknown  Family history of hyperlipidemia, Age at diagnosis: Age Unknown  Family history of hypertension, Age at diagnosis: Age Unknown     [facility] [place]

## 2023-08-28 NOTE — ED ADULT NURSE NOTE - NSFALLUNIVINTERV_ED_ALL_ED
Bed/Stretcher in lowest position, wheels locked, appropriate side rails in place/Call bell, personal items and telephone in reach/Instruct patient to call for assistance before getting out of bed/chair/stretcher/Non-slip footwear applied when patient is off stretcher/[place] to call system/Physically safe environment - no spills, clutter or unnecessary equipment/Purposeful proactive rounding/Room/bathroom lighting operational, light cord in reach

## 2023-08-28 NOTE — ED PROVIDER NOTE - PROGRESS NOTE DETAILS
JANAY: I was signed out this pt pending CT imaging reviewed which shows no PE troponin is 7 and proBNP is less than 36 labs are otherwise unremarkable.  Patient is aware and will be discharged home to follow with PMD.  Return precautions explained and understood.

## 2023-08-28 NOTE — ED ADULT TRIAGE NOTE - CHIEF COMPLAINT QUOTE
PT arrives ambulatory to triage c/o shortness of breath that started 3 weeks ago. Pt 29 weeks pregnant with her first child. Denies chest pain, headache, dizziness.

## 2023-08-28 NOTE — ED PROVIDER NOTE - CLINICAL SUMMARY MEDICAL DECISION MAKING FREE TEXT BOX
42 yo F who is currently 28 weeks pregnant who presents to the ED with shortness of breath. She has been having shortness of breath since she first began pregnant. Saw pulmonology about 1 month ago and they thought it was just due to pregnancy but symptoms are getting worse. Saw her ObGYn today who referred her here for CT PE to rule out pulmonary embolism. No chest pain, LE edema, fever, chills, n/v/d/  Plan for labs including troponin, CT PE. Went over risks/benefits of radiation of CT in relation to pregnancy, pt signed consent form

## 2023-08-28 NOTE — ED PROVIDER NOTE - PATIENT PORTAL LINK FT
You can access the FollowMyHealth Patient Portal offered by [facility] by registering at the following website: [email or web address]. By joining [facility]’s FollowMyHealth portal, you will also be able to view your health information using other applications (apps) compatible with our system.

## 2023-08-29 ENCOUNTER — APPOINTMENT (OUTPATIENT)
Dept: CV DIAGNOSITCS | Facility: HOSPITAL | Age: 43
End: 2023-08-29

## 2023-08-29 ENCOUNTER — ASOB RESULT (OUTPATIENT)
Age: 43
End: 2023-08-29

## 2023-08-29 ENCOUNTER — OUTPATIENT (OUTPATIENT)
Dept: OUTPATIENT SERVICES | Facility: HOSPITAL | Age: 43
LOS: 1 days | End: 2023-08-29
Payer: COMMERCIAL

## 2023-08-29 ENCOUNTER — APPOINTMENT (OUTPATIENT)
Dept: MATERNAL FETAL MEDICINE | Facility: CLINIC | Age: 43
End: 2023-08-29
Payer: COMMERCIAL

## 2023-08-29 DIAGNOSIS — R06.02 SHORTNESS OF BREATH: ICD-10-CM

## 2023-08-29 PROCEDURE — G0108 DIAB MANAGE TRN  PER INDIV: CPT | Mod: 95

## 2023-08-29 PROCEDURE — 93306 TTE W/DOPPLER COMPLETE: CPT | Mod: 26

## 2023-08-30 ENCOUNTER — APPOINTMENT (OUTPATIENT)
Dept: ANTEPARTUM | Facility: CLINIC | Age: 43
End: 2023-08-30
Payer: COMMERCIAL

## 2023-08-30 ENCOUNTER — ASOB RESULT (OUTPATIENT)
Age: 43
End: 2023-08-30

## 2023-08-30 PROCEDURE — 76819 FETAL BIOPHYS PROFIL W/O NST: CPT

## 2023-08-30 PROCEDURE — 76816 OB US FOLLOW-UP PER FETUS: CPT

## 2023-09-01 ENCOUNTER — NON-APPOINTMENT (OUTPATIENT)
Age: 43
End: 2023-09-01

## 2023-09-05 ENCOUNTER — APPOINTMENT (OUTPATIENT)
Dept: PULMONOLOGY | Facility: CLINIC | Age: 43
End: 2023-09-05

## 2023-09-06 ENCOUNTER — APPOINTMENT (OUTPATIENT)
Dept: PULMONOLOGY | Facility: CLINIC | Age: 43
End: 2023-09-06
Payer: COMMERCIAL

## 2023-09-06 ENCOUNTER — RX RENEWAL (OUTPATIENT)
Age: 43
End: 2023-09-06

## 2023-09-06 DIAGNOSIS — U07.1 COVID-19: ICD-10-CM

## 2023-09-06 DIAGNOSIS — R06.02 SHORTNESS OF BREATH: ICD-10-CM

## 2023-09-06 PROCEDURE — 99212 OFFICE O/P EST SF 10 MIN: CPT | Mod: 95

## 2023-09-06 NOTE — HISTORY OF PRESENT ILLNESS
[TextBox_4] : Telehealth visit for 2 separate issues.  The first is that the patient was told by her OB that she should be evaluated for sleep apnea.  She has a history of snoring and nonrestorative sleep.She was supposed to come into the office for a visit but she developed COVID.  She had a fever for a few days which is now resolved that she has a little bit of cough.  She has not been checking her O2 saturations. I advised her to purchase a pulse oximeter and to contact me if her O2 sat is 94% or below.  In the interim we will arrange a home sleep study

## 2023-09-06 NOTE — ASSESSMENT
[FreeTextEntry1] : Based on history and physical the patient has a high likelihood of having obstructive sleep apnea. Further assessment by sleep testing is recommended. There is no contraindication to a home sleep study. We will therefore proceed to two night home apnea sleep study for further assessment.

## 2023-09-06 NOTE — REASON FOR VISIT
[Home] : at home, [unfilled] , at the time of the visit. [Medical Office: ([facility])___] : at the medical office located in  [Patient] : the patient [This encounter was initiated by telehealth (audio with video) and converted to telephone (audio only) due to technical difficulties.] : This encounter was initiated by telehealth (audio with video) and converted to telephone (audio only) due to technical difficulties.

## 2023-09-12 ENCOUNTER — APPOINTMENT (OUTPATIENT)
Dept: MATERNAL FETAL MEDICINE | Facility: CLINIC | Age: 43
End: 2023-09-12

## 2023-09-14 ENCOUNTER — APPOINTMENT (OUTPATIENT)
Dept: PULMONOLOGY | Facility: CLINIC | Age: 43
End: 2023-09-14
Payer: COMMERCIAL

## 2023-09-14 ENCOUNTER — ASOB RESULT (OUTPATIENT)
Age: 43
End: 2023-09-14

## 2023-09-14 ENCOUNTER — APPOINTMENT (OUTPATIENT)
Dept: MATERNAL FETAL MEDICINE | Facility: CLINIC | Age: 43
End: 2023-09-14
Payer: COMMERCIAL

## 2023-09-14 VITALS — DIASTOLIC BLOOD PRESSURE: 88 MMHG | OXYGEN SATURATION: 98 % | SYSTOLIC BLOOD PRESSURE: 122 MMHG | HEART RATE: 103 BPM

## 2023-09-14 PROCEDURE — G0108 DIAB MANAGE TRN  PER INDIV: CPT | Mod: 95

## 2023-09-14 PROCEDURE — ZZZZZ: CPT

## 2023-09-14 PROCEDURE — 99212 OFFICE O/P EST SF 10 MIN: CPT

## 2023-09-27 ENCOUNTER — ASOB RESULT (OUTPATIENT)
Age: 43
End: 2023-09-27

## 2023-09-27 ENCOUNTER — APPOINTMENT (OUTPATIENT)
Dept: MATERNAL FETAL MEDICINE | Facility: CLINIC | Age: 43
End: 2023-09-27
Payer: COMMERCIAL

## 2023-09-27 ENCOUNTER — APPOINTMENT (OUTPATIENT)
Dept: PULMONOLOGY | Facility: CLINIC | Age: 43
End: 2023-09-27
Payer: COMMERCIAL

## 2023-09-27 PROCEDURE — G0108 DIAB MANAGE TRN  PER INDIV: CPT | Mod: 95

## 2023-09-27 PROCEDURE — 99212 OFFICE O/P EST SF 10 MIN: CPT | Mod: 95

## 2023-09-28 ENCOUNTER — APPOINTMENT (OUTPATIENT)
Dept: ANTEPARTUM | Facility: CLINIC | Age: 43
End: 2023-09-28
Payer: COMMERCIAL

## 2023-09-28 ENCOUNTER — ASOB RESULT (OUTPATIENT)
Age: 43
End: 2023-09-28

## 2023-09-28 PROCEDURE — 76816 OB US FOLLOW-UP PER FETUS: CPT

## 2023-09-28 PROCEDURE — 76818 FETAL BIOPHYS PROFILE W/NST: CPT | Mod: 59

## 2023-10-04 PROCEDURE — 95800 SLP STDY UNATTENDED: CPT

## 2023-10-05 ENCOUNTER — ASOB RESULT (OUTPATIENT)
Age: 43
End: 2023-10-05

## 2023-10-05 ENCOUNTER — TRANSCRIPTION ENCOUNTER (OUTPATIENT)
Age: 43
End: 2023-10-05

## 2023-10-05 ENCOUNTER — APPOINTMENT (OUTPATIENT)
Dept: ANTEPARTUM | Facility: CLINIC | Age: 43
End: 2023-10-05
Payer: COMMERCIAL

## 2023-10-05 PROCEDURE — 76818 FETAL BIOPHYS PROFILE W/NST: CPT

## 2023-10-09 ENCOUNTER — APPOINTMENT (OUTPATIENT)
Dept: MATERNAL FETAL MEDICINE | Facility: CLINIC | Age: 43
End: 2023-10-09
Payer: COMMERCIAL

## 2023-10-09 ENCOUNTER — ASOB RESULT (OUTPATIENT)
Age: 43
End: 2023-10-09

## 2023-10-09 PROCEDURE — G0108 DIAB MANAGE TRN  PER INDIV: CPT | Mod: 95

## 2023-10-11 ENCOUNTER — APPOINTMENT (OUTPATIENT)
Dept: PULMONOLOGY | Facility: CLINIC | Age: 43
End: 2023-10-11
Payer: COMMERCIAL

## 2023-10-11 ENCOUNTER — NON-APPOINTMENT (OUTPATIENT)
Age: 43
End: 2023-10-11

## 2023-10-11 PROCEDURE — 99213 OFFICE O/P EST LOW 20 MIN: CPT | Mod: 95

## 2023-10-12 ENCOUNTER — ASOB RESULT (OUTPATIENT)
Age: 43
End: 2023-10-12

## 2023-10-12 ENCOUNTER — APPOINTMENT (OUTPATIENT)
Dept: ANTEPARTUM | Facility: CLINIC | Age: 43
End: 2023-10-12
Payer: COMMERCIAL

## 2023-10-12 PROCEDURE — 76818 FETAL BIOPHYS PROFILE W/NST: CPT

## 2023-10-18 ENCOUNTER — APPOINTMENT (OUTPATIENT)
Dept: PULMONOLOGY | Facility: CLINIC | Age: 43
End: 2023-10-18

## 2023-10-19 ENCOUNTER — APPOINTMENT (OUTPATIENT)
Dept: ANTEPARTUM | Facility: CLINIC | Age: 43
End: 2023-10-19
Payer: COMMERCIAL

## 2023-10-19 ENCOUNTER — ASOB RESULT (OUTPATIENT)
Age: 43
End: 2023-10-19

## 2023-10-19 ENCOUNTER — APPOINTMENT (OUTPATIENT)
Dept: PULMONOLOGY | Facility: CLINIC | Age: 43
End: 2023-10-19
Payer: COMMERCIAL

## 2023-10-19 DIAGNOSIS — G47.33 OBSTRUCTIVE SLEEP APNEA (ADULT) (PEDIATRIC): ICD-10-CM

## 2023-10-19 PROCEDURE — 76819 FETAL BIOPHYS PROFIL W/O NST: CPT

## 2023-10-19 PROCEDURE — 99212 OFFICE O/P EST SF 10 MIN: CPT | Mod: 95

## 2023-10-23 ENCOUNTER — ASOB RESULT (OUTPATIENT)
Age: 43
End: 2023-10-23

## 2023-10-23 ENCOUNTER — APPOINTMENT (OUTPATIENT)
Dept: MATERNAL FETAL MEDICINE | Facility: CLINIC | Age: 43
End: 2023-10-23
Payer: COMMERCIAL

## 2023-10-23 PROCEDURE — G0108 DIAB MANAGE TRN  PER INDIV: CPT

## 2023-10-26 ENCOUNTER — ASOB RESULT (OUTPATIENT)
Age: 43
End: 2023-10-26

## 2023-10-26 ENCOUNTER — APPOINTMENT (OUTPATIENT)
Dept: ANTEPARTUM | Facility: CLINIC | Age: 43
End: 2023-10-26
Payer: COMMERCIAL

## 2023-10-26 PROCEDURE — 76819 FETAL BIOPHYS PROFIL W/O NST: CPT | Mod: 59

## 2023-10-26 PROCEDURE — 99212 OFFICE O/P EST SF 10 MIN: CPT | Mod: 25

## 2023-10-26 PROCEDURE — 76816 OB US FOLLOW-UP PER FETUS: CPT

## 2023-10-30 ENCOUNTER — ASOB RESULT (OUTPATIENT)
Age: 43
End: 2023-10-30

## 2023-10-30 ENCOUNTER — APPOINTMENT (OUTPATIENT)
Dept: MATERNAL FETAL MEDICINE | Facility: CLINIC | Age: 43
End: 2023-10-30
Payer: COMMERCIAL

## 2023-10-30 PROCEDURE — G0108 DIAB MANAGE TRN  PER INDIV: CPT

## 2023-11-02 ENCOUNTER — APPOINTMENT (OUTPATIENT)
Dept: ANTEPARTUM | Facility: CLINIC | Age: 43
End: 2023-11-02
Payer: COMMERCIAL

## 2023-11-02 ENCOUNTER — ASOB RESULT (OUTPATIENT)
Age: 43
End: 2023-11-02

## 2023-11-02 PROCEDURE — 76819 FETAL BIOPHYS PROFIL W/O NST: CPT

## 2023-11-06 ENCOUNTER — APPOINTMENT (OUTPATIENT)
Dept: MATERNAL FETAL MEDICINE | Facility: CLINIC | Age: 43
End: 2023-11-06
Payer: COMMERCIAL

## 2023-11-06 ENCOUNTER — ASOB RESULT (OUTPATIENT)
Age: 43
End: 2023-11-06

## 2023-11-06 PROCEDURE — G0108 DIAB MANAGE TRN  PER INDIV: CPT

## 2023-11-08 ENCOUNTER — OUTPATIENT (OUTPATIENT)
Dept: OUTPATIENT SERVICES | Facility: HOSPITAL | Age: 43
LOS: 1 days | Discharge: HOME | End: 2023-11-08
Payer: COMMERCIAL

## 2023-11-08 ENCOUNTER — APPOINTMENT (OUTPATIENT)
Dept: ANTEPARTUM | Facility: CLINIC | Age: 43
End: 2023-11-08

## 2023-11-08 VITALS
SYSTOLIC BLOOD PRESSURE: 137 MMHG | HEART RATE: 99 BPM | TEMPERATURE: 98 F | RESPIRATION RATE: 18 BRPM | DIASTOLIC BLOOD PRESSURE: 94 MMHG

## 2023-11-08 VITALS — SYSTOLIC BLOOD PRESSURE: 118 MMHG | HEART RATE: 87 BPM | DIASTOLIC BLOOD PRESSURE: 62 MMHG

## 2023-11-08 DIAGNOSIS — O26.899 OTHER SPECIFIED PREGNANCY RELATED CONDITIONS, UNSPECIFIED TRIMESTER: ICD-10-CM

## 2023-11-08 PROCEDURE — 99221 1ST HOSP IP/OBS SF/LOW 40: CPT | Mod: 25

## 2023-11-08 PROCEDURE — 59025 FETAL NON-STRESS TEST: CPT | Mod: 26

## 2023-11-08 RX ORDER — ATORVASTATIN CALCIUM 80 MG/1
1 TABLET, FILM COATED ORAL
Qty: 0 | Refills: 0 | DISCHARGE

## 2023-11-08 NOTE — OB RN TRIAGE NOTE - NSICDXPASTMEDICALHX_GEN_ALL_CORE_FT
PAST MEDICAL HISTORY:  Amenorrhea     History of endometriosis     Hyperlipidemia     PCOS (polycystic ovarian syndrome)     Pelvic and perineal pain     Sleep apnea

## 2023-11-08 NOTE — OB RN TRIAGE NOTE - NS_TRIAGEMEDICALSCREENINGPERFORMEDBY_OBGYN_ALL_OB_FT
26 yo, BMI 43.    Relevant Problems   No relevant active problems       Physical Exam    Airway   Mallampati: I  TM distance: >3 FB  Neck ROM: full       Cardiovascular - normal exam  Rhythm: regular  Rate: normal  (-) murmur     Dental - normal exam         Pulmonary - normal exam  Breath sounds clear to auscultation     Abdominal    Neurological - normal exam                 Anesthesia Plan    ASA 3   ASA physical status 3 criteria: morbid obesity - BMI greater than or equal to 40    Plan - general       Airway plan will be ETT        Induction: intravenous    Postoperative Plan: Postoperative administration of opioids is intended.    Pertinent diagnostic labs and testing reviewed    Informed Consent:    Anesthetic plan and risks discussed with patient.    Use of blood products discussed with: patient whom consented to blood products.          JOSEPH Nobles

## 2023-11-08 NOTE — OB PROVIDER TRIAGE NOTE - NSHPPHYSICALEXAM_GEN_ALL_CORE
pt seen and examined    ICU Vital Signs Last 24 Hrs  T(C): 36.7 (08 Nov 2023 14:19), Max: 36.7 (08 Nov 2023 13:59)  T(F): 98.06 (08 Nov 2023 14:19), Max: 98.1 (08 Nov 2023 13:59)  HR: 87 (08 Nov 2023 15:44) (87 - 109)  BP: 118/62 (08 Nov 2023 15:44) (110/65 - 137/94)  BP(mean): --  ABP: --  ABP(mean): --  RR: 18 (08 Nov 2023 14:19) (18 - 18)  SpO2: --    pt alert OX3   lungs clear   heart s1 s2   abd soft gravid  non tender    placed on EFM   NST  reactive     with accelerations 160 no decelerations  moderate variability   no contraction pattern pt seen and examined    ICU Vital Signs Last 24 Hrs  T(C): 36.7 (08 Nov 2023 14:19), Max: 36.7 (08 Nov 2023 13:59)  T(F): 98.06 (08 Nov 2023 14:19), Max: 98.1 (08 Nov 2023 13:59)  HR: 87 (08 Nov 2023 15:44) (87 - 109)  BP: 118/62 (08 Nov 2023 15:44) (110/65 - 137/94)  initial /94 with small cufff subsequent Bp with larger cuff normo tensive  denied any s.s of PEC   RR: 18 (08 Nov 2023 14:19) (18 - 18)  SpO2: --  99%     pt alert OX3   lungs clear   heart s1 s2   abd soft gravid  non tender    placed on EFM   NST  reactive     with accelerations 160 no decelerations  moderate variability   no contraction pattern

## 2023-11-08 NOTE — OB RN TRIAGE NOTE - CHIEF COMPLAINT QUOTE
im having pains im having pains/ pressure, less movement from baby since last night  *scheduled IOL 11/9*

## 2023-11-08 NOTE — OB PROVIDER TRIAGE NOTE - HISTORY OF PRESENT ILLNESS
43 year old female P0 at  38.3 weeks gestation who presenst with Decreased FM  this am now feeling good Fm hile waiting in triage    feeling mild cramping intermittently 2/10  denied  any LOF VB     pt is GDMA2  on Insulin   denied any hx of HTN       PNC Dr Ricci     43 year old female P0 at  38.3 weeks gestation who presenst with Decreased FM  this am now feeling good Fm hile waiting in triage    feeling mild cramping intermittently 2/10  denied  any LOF VB     pt is GDMA2  on Insulin   denied any hx of HTN       PNC Dr Ricci    GDMA 2     BMI>40

## 2023-11-08 NOTE — OB PROVIDER TRIAGE NOTE - NSPRIMARYCAREPROV_OBGYN_ALL_OB
Writer sent 90 day supply as requested by insurance.     No further action required.   MD//SCARLET/DAVID

## 2023-11-08 NOTE — OB PROVIDER TRIAGE NOTE - NSOBPROVIDERNOTE_OBGYN_ALL_OB_FT
43 year old female P0  at 38.3 weeks gestation  fetal heart reactive     NST reactive BPP 8/8      initial /94 with small cufff subsequent Bp with larger cuff normo tensive  denied any s.s of PEC     - Discussed with Dr. Odom   - Patient to be discharged home with follow up and return precautions  - Please follow up with your obstetrician at your next scheduled appointment.   - Please return for decreased/no fetal movement, vaginal bleeding similar to that of a period, leaking/gush of fluid, regular contractions occurring 4-5 minutes for one hour or requiring pain medication   - Patient and partner and educated of plan and demonstrate understanding. All questions answered. Discharge instructions provided and signed.   - Discharged at _1600p  has scheduled IOL at 5a on 11/9/23      FM counts reviewed with patient    s/s of labor reviewed with pt

## 2023-11-09 ENCOUNTER — INPATIENT (INPATIENT)
Facility: HOSPITAL | Age: 43
LOS: 3 days | Discharge: HOME | End: 2023-11-13
Attending: OBSTETRICS & GYNECOLOGY | Admitting: OBSTETRICS & GYNECOLOGY
Payer: COMMERCIAL

## 2023-11-09 ENCOUNTER — TRANSCRIPTION ENCOUNTER (OUTPATIENT)
Age: 43
End: 2023-11-09

## 2023-11-09 VITALS
SYSTOLIC BLOOD PRESSURE: 132 MMHG | DIASTOLIC BLOOD PRESSURE: 81 MMHG | OXYGEN SATURATION: 99 % | RESPIRATION RATE: 18 BRPM | HEART RATE: 97 BPM | TEMPERATURE: 98 F | WEIGHT: 229.94 LBS | HEIGHT: 63 IN

## 2023-11-09 DIAGNOSIS — O99.283 ENDOCRINE, NUTRITIONAL AND METABOLIC DISEASES COMPLICATING PREGNANCY, THIRD TRIMESTER: ICD-10-CM

## 2023-11-09 DIAGNOSIS — Z98.890 OTHER SPECIFIED POSTPROCEDURAL STATES: Chronic | ICD-10-CM

## 2023-11-09 DIAGNOSIS — G47.30 SLEEP APNEA, UNSPECIFIED: ICD-10-CM

## 2023-11-09 DIAGNOSIS — O24.419 GESTATIONAL DIABETES MELLITUS IN PREGNANCY, UNSPECIFIED CONTROL: ICD-10-CM

## 2023-11-09 DIAGNOSIS — Z3A.38 38 WEEKS GESTATION OF PREGNANCY: ICD-10-CM

## 2023-11-09 DIAGNOSIS — O09.513 SUPERVISION OF ELDERLY PRIMIGRAVIDA, THIRD TRIMESTER: ICD-10-CM

## 2023-11-09 DIAGNOSIS — R10.9 UNSPECIFIED ABDOMINAL PAIN: ICD-10-CM

## 2023-11-09 DIAGNOSIS — R03.0 ELEVATED BLOOD-PRESSURE READING, WITHOUT DIAGNOSIS OF HYPERTENSION: ICD-10-CM

## 2023-11-09 DIAGNOSIS — O24.414 GESTATIONAL DIABETES MELLITUS IN PREGNANCY, INSULIN CONTROLLED: ICD-10-CM

## 2023-11-09 DIAGNOSIS — O99.353 DISEASES OF THE NERVOUS SYSTEM COMPLICATING PREGNANCY, THIRD TRIMESTER: ICD-10-CM

## 2023-11-09 DIAGNOSIS — E28.2 POLYCYSTIC OVARIAN SYNDROME: ICD-10-CM

## 2023-11-09 DIAGNOSIS — O26.893 OTHER SPECIFIED PREGNANCY RELATED CONDITIONS, THIRD TRIMESTER: ICD-10-CM

## 2023-11-09 DIAGNOSIS — E78.5 HYPERLIPIDEMIA, UNSPECIFIED: ICD-10-CM

## 2023-11-09 DIAGNOSIS — O09.813 SUPERVISION OF PREGNANCY RESULTING FROM ASSISTED REPRODUCTIVE TECHNOLOGY, THIRD TRIMESTER: ICD-10-CM

## 2023-11-09 DIAGNOSIS — O36.8130 DECREASED FETAL MOVEMENTS, THIRD TRIMESTER, NOT APPLICABLE OR UNSPECIFIED: ICD-10-CM

## 2023-11-09 PROBLEM — Z87.42 PERSONAL HISTORY OF OTHER DISEASES OF THE FEMALE GENITAL TRACT: Chronic | Status: ACTIVE | Noted: 2023-11-08

## 2023-11-09 LAB
ALBUMIN SERPL ELPH-MCNC: 3.2 G/DL — LOW (ref 3.3–5)
ALBUMIN SERPL ELPH-MCNC: 3.2 G/DL — LOW (ref 3.3–5)
ALP SERPL-CCNC: 217 U/L — HIGH (ref 40–120)
ALP SERPL-CCNC: 217 U/L — HIGH (ref 40–120)
ALT FLD-CCNC: 9 U/L — SIGNIFICANT CHANGE UP (ref 4–33)
ALT FLD-CCNC: 9 U/L — SIGNIFICANT CHANGE UP (ref 4–33)
ANION GAP SERPL CALC-SCNC: 11 MMOL/L — SIGNIFICANT CHANGE UP (ref 7–14)
ANION GAP SERPL CALC-SCNC: 11 MMOL/L — SIGNIFICANT CHANGE UP (ref 7–14)
APPEARANCE UR: ABNORMAL
APPEARANCE UR: ABNORMAL
APTT BLD: 28.8 SEC — SIGNIFICANT CHANGE UP (ref 24.5–35.6)
APTT BLD: 28.8 SEC — SIGNIFICANT CHANGE UP (ref 24.5–35.6)
AST SERPL-CCNC: 15 U/L — SIGNIFICANT CHANGE UP (ref 4–32)
AST SERPL-CCNC: 15 U/L — SIGNIFICANT CHANGE UP (ref 4–32)
BACTERIA # UR AUTO: NEGATIVE /HPF — SIGNIFICANT CHANGE UP
BACTERIA # UR AUTO: NEGATIVE /HPF — SIGNIFICANT CHANGE UP
BASOPHILS # BLD AUTO: 0.05 K/UL — SIGNIFICANT CHANGE UP (ref 0–0.2)
BASOPHILS NFR BLD AUTO: 0.5 % — SIGNIFICANT CHANGE UP (ref 0–2)
BASOPHILS NFR BLD AUTO: 0.5 % — SIGNIFICANT CHANGE UP (ref 0–2)
BASOPHILS NFR BLD AUTO: 0.6 % — SIGNIFICANT CHANGE UP (ref 0–2)
BASOPHILS NFR BLD AUTO: 0.6 % — SIGNIFICANT CHANGE UP (ref 0–2)
BILIRUB SERPL-MCNC: 0.4 MG/DL — SIGNIFICANT CHANGE UP (ref 0.2–1.2)
BILIRUB SERPL-MCNC: 0.4 MG/DL — SIGNIFICANT CHANGE UP (ref 0.2–1.2)
BILIRUB UR-MCNC: NEGATIVE — SIGNIFICANT CHANGE UP
BILIRUB UR-MCNC: NEGATIVE — SIGNIFICANT CHANGE UP
BLD GP AB SCN SERPL QL: NEGATIVE — SIGNIFICANT CHANGE UP
BLD GP AB SCN SERPL QL: NEGATIVE — SIGNIFICANT CHANGE UP
BUN SERPL-MCNC: 11 MG/DL — SIGNIFICANT CHANGE UP (ref 7–23)
BUN SERPL-MCNC: 11 MG/DL — SIGNIFICANT CHANGE UP (ref 7–23)
CALCIUM SERPL-MCNC: 9.5 MG/DL — SIGNIFICANT CHANGE UP (ref 8.4–10.5)
CALCIUM SERPL-MCNC: 9.5 MG/DL — SIGNIFICANT CHANGE UP (ref 8.4–10.5)
CAST: 0 /LPF — SIGNIFICANT CHANGE UP (ref 0–4)
CAST: 0 /LPF — SIGNIFICANT CHANGE UP (ref 0–4)
CHLORIDE SERPL-SCNC: 106 MMOL/L — SIGNIFICANT CHANGE UP (ref 98–107)
CHLORIDE SERPL-SCNC: 106 MMOL/L — SIGNIFICANT CHANGE UP (ref 98–107)
CO2 SERPL-SCNC: 19 MMOL/L — LOW (ref 22–31)
CO2 SERPL-SCNC: 19 MMOL/L — LOW (ref 22–31)
COLOR SPEC: YELLOW — SIGNIFICANT CHANGE UP
COLOR SPEC: YELLOW — SIGNIFICANT CHANGE UP
CREAT ?TM UR-MCNC: 138 MG/DL — SIGNIFICANT CHANGE UP
CREAT ?TM UR-MCNC: 138 MG/DL — SIGNIFICANT CHANGE UP
CREAT SERPL-MCNC: 0.89 MG/DL — SIGNIFICANT CHANGE UP (ref 0.5–1.3)
CREAT SERPL-MCNC: 0.89 MG/DL — SIGNIFICANT CHANGE UP (ref 0.5–1.3)
DIFF PNL FLD: ABNORMAL
DIFF PNL FLD: ABNORMAL
EGFR: 83 ML/MIN/1.73M2 — SIGNIFICANT CHANGE UP
EGFR: 83 ML/MIN/1.73M2 — SIGNIFICANT CHANGE UP
EOSINOPHIL # BLD AUTO: 0.1 K/UL — SIGNIFICANT CHANGE UP (ref 0–0.5)
EOSINOPHIL NFR BLD AUTO: 1 % — SIGNIFICANT CHANGE UP (ref 0–6)
EOSINOPHIL NFR BLD AUTO: 1 % — SIGNIFICANT CHANGE UP (ref 0–6)
EOSINOPHIL NFR BLD AUTO: 1.1 % — SIGNIFICANT CHANGE UP (ref 0–6)
EOSINOPHIL NFR BLD AUTO: 1.1 % — SIGNIFICANT CHANGE UP (ref 0–6)
FIBRINOGEN PPP-MCNC: 494 MG/DL — HIGH (ref 200–465)
FIBRINOGEN PPP-MCNC: 494 MG/DL — HIGH (ref 200–465)
GLUCOSE BLDC GLUCOMTR-MCNC: 107 MG/DL — HIGH (ref 70–99)
GLUCOSE BLDC GLUCOMTR-MCNC: 107 MG/DL — HIGH (ref 70–99)
GLUCOSE BLDC GLUCOMTR-MCNC: 70 MG/DL — SIGNIFICANT CHANGE UP (ref 70–99)
GLUCOSE BLDC GLUCOMTR-MCNC: 70 MG/DL — SIGNIFICANT CHANGE UP (ref 70–99)
GLUCOSE BLDC GLUCOMTR-MCNC: 86 MG/DL — SIGNIFICANT CHANGE UP (ref 70–99)
GLUCOSE BLDC GLUCOMTR-MCNC: 86 MG/DL — SIGNIFICANT CHANGE UP (ref 70–99)
GLUCOSE BLDC GLUCOMTR-MCNC: 90 MG/DL — SIGNIFICANT CHANGE UP (ref 70–99)
GLUCOSE BLDC GLUCOMTR-MCNC: 90 MG/DL — SIGNIFICANT CHANGE UP (ref 70–99)
GLUCOSE BLDC GLUCOMTR-MCNC: 91 MG/DL — SIGNIFICANT CHANGE UP (ref 70–99)
GLUCOSE BLDC GLUCOMTR-MCNC: 91 MG/DL — SIGNIFICANT CHANGE UP (ref 70–99)
GLUCOSE SERPL-MCNC: 88 MG/DL — SIGNIFICANT CHANGE UP (ref 70–99)
GLUCOSE SERPL-MCNC: 88 MG/DL — SIGNIFICANT CHANGE UP (ref 70–99)
GLUCOSE UR QL: NEGATIVE MG/DL — SIGNIFICANT CHANGE UP
GLUCOSE UR QL: NEGATIVE MG/DL — SIGNIFICANT CHANGE UP
HCT VFR BLD CALC: 31.4 % — LOW (ref 34.5–45)
HCT VFR BLD CALC: 31.4 % — LOW (ref 34.5–45)
HCT VFR BLD CALC: 35.9 % — SIGNIFICANT CHANGE UP (ref 34.5–45)
HCT VFR BLD CALC: 35.9 % — SIGNIFICANT CHANGE UP (ref 34.5–45)
HGB BLD-MCNC: 10 G/DL — LOW (ref 11.5–15.5)
HGB BLD-MCNC: 10 G/DL — LOW (ref 11.5–15.5)
HGB BLD-MCNC: 11.1 G/DL — LOW (ref 11.5–15.5)
HGB BLD-MCNC: 11.1 G/DL — LOW (ref 11.5–15.5)
IANC: 5.7 K/UL — SIGNIFICANT CHANGE UP (ref 1.8–7.4)
IANC: 5.7 K/UL — SIGNIFICANT CHANGE UP (ref 1.8–7.4)
IANC: 7.14 K/UL — SIGNIFICANT CHANGE UP (ref 1.8–7.4)
IANC: 7.14 K/UL — SIGNIFICANT CHANGE UP (ref 1.8–7.4)
IMM GRANULOCYTES NFR BLD AUTO: 0.5 % — SIGNIFICANT CHANGE UP (ref 0–0.9)
IMM GRANULOCYTES NFR BLD AUTO: 0.5 % — SIGNIFICANT CHANGE UP (ref 0–0.9)
IMM GRANULOCYTES NFR BLD AUTO: 0.6 % — SIGNIFICANT CHANGE UP (ref 0–0.9)
IMM GRANULOCYTES NFR BLD AUTO: 0.6 % — SIGNIFICANT CHANGE UP (ref 0–0.9)
INR BLD: 0.95 RATIO — SIGNIFICANT CHANGE UP (ref 0.85–1.18)
INR BLD: 0.95 RATIO — SIGNIFICANT CHANGE UP (ref 0.85–1.18)
KETONES UR-MCNC: NEGATIVE MG/DL — SIGNIFICANT CHANGE UP
KETONES UR-MCNC: NEGATIVE MG/DL — SIGNIFICANT CHANGE UP
LDH SERPL L TO P-CCNC: 174 U/L — SIGNIFICANT CHANGE UP (ref 135–225)
LDH SERPL L TO P-CCNC: 174 U/L — SIGNIFICANT CHANGE UP (ref 135–225)
LEUKOCYTE ESTERASE UR-ACNC: ABNORMAL
LEUKOCYTE ESTERASE UR-ACNC: ABNORMAL
LYMPHOCYTES # BLD AUTO: 2.41 K/UL — SIGNIFICANT CHANGE UP (ref 1–3.3)
LYMPHOCYTES # BLD AUTO: 2.41 K/UL — SIGNIFICANT CHANGE UP (ref 1–3.3)
LYMPHOCYTES # BLD AUTO: 2.55 K/UL — SIGNIFICANT CHANGE UP (ref 1–3.3)
LYMPHOCYTES # BLD AUTO: 2.55 K/UL — SIGNIFICANT CHANGE UP (ref 1–3.3)
LYMPHOCYTES # BLD AUTO: 23.3 % — SIGNIFICANT CHANGE UP (ref 13–44)
LYMPHOCYTES # BLD AUTO: 23.3 % — SIGNIFICANT CHANGE UP (ref 13–44)
LYMPHOCYTES # BLD AUTO: 28.1 % — SIGNIFICANT CHANGE UP (ref 13–44)
LYMPHOCYTES # BLD AUTO: 28.1 % — SIGNIFICANT CHANGE UP (ref 13–44)
MCHC RBC-ENTMCNC: 25.1 PG — LOW (ref 27–34)
MCHC RBC-ENTMCNC: 25.1 PG — LOW (ref 27–34)
MCHC RBC-ENTMCNC: 25.4 PG — LOW (ref 27–34)
MCHC RBC-ENTMCNC: 25.4 PG — LOW (ref 27–34)
MCHC RBC-ENTMCNC: 30.9 GM/DL — LOW (ref 32–36)
MCHC RBC-ENTMCNC: 30.9 GM/DL — LOW (ref 32–36)
MCHC RBC-ENTMCNC: 31.8 GM/DL — LOW (ref 32–36)
MCHC RBC-ENTMCNC: 31.8 GM/DL — LOW (ref 32–36)
MCV RBC AUTO: 79.9 FL — LOW (ref 80–100)
MCV RBC AUTO: 79.9 FL — LOW (ref 80–100)
MCV RBC AUTO: 81 FL — SIGNIFICANT CHANGE UP (ref 80–100)
MCV RBC AUTO: 81 FL — SIGNIFICANT CHANGE UP (ref 80–100)
MONOCYTES # BLD AUTO: 0.59 K/UL — SIGNIFICANT CHANGE UP (ref 0–0.9)
MONOCYTES # BLD AUTO: 0.59 K/UL — SIGNIFICANT CHANGE UP (ref 0–0.9)
MONOCYTES # BLD AUTO: 0.61 K/UL — SIGNIFICANT CHANGE UP (ref 0–0.9)
MONOCYTES # BLD AUTO: 0.61 K/UL — SIGNIFICANT CHANGE UP (ref 0–0.9)
MONOCYTES NFR BLD AUTO: 5.7 % — SIGNIFICANT CHANGE UP (ref 2–14)
MONOCYTES NFR BLD AUTO: 5.7 % — SIGNIFICANT CHANGE UP (ref 2–14)
MONOCYTES NFR BLD AUTO: 6.7 % — SIGNIFICANT CHANGE UP (ref 2–14)
MONOCYTES NFR BLD AUTO: 6.7 % — SIGNIFICANT CHANGE UP (ref 2–14)
NEUTROPHILS # BLD AUTO: 5.7 K/UL — SIGNIFICANT CHANGE UP (ref 1.8–7.4)
NEUTROPHILS # BLD AUTO: 5.7 K/UL — SIGNIFICANT CHANGE UP (ref 1.8–7.4)
NEUTROPHILS # BLD AUTO: 7.14 K/UL — SIGNIFICANT CHANGE UP (ref 1.8–7.4)
NEUTROPHILS # BLD AUTO: 7.14 K/UL — SIGNIFICANT CHANGE UP (ref 1.8–7.4)
NEUTROPHILS NFR BLD AUTO: 62.9 % — SIGNIFICANT CHANGE UP (ref 43–77)
NEUTROPHILS NFR BLD AUTO: 62.9 % — SIGNIFICANT CHANGE UP (ref 43–77)
NEUTROPHILS NFR BLD AUTO: 69 % — SIGNIFICANT CHANGE UP (ref 43–77)
NEUTROPHILS NFR BLD AUTO: 69 % — SIGNIFICANT CHANGE UP (ref 43–77)
NITRITE UR-MCNC: NEGATIVE — SIGNIFICANT CHANGE UP
NITRITE UR-MCNC: NEGATIVE — SIGNIFICANT CHANGE UP
NRBC # BLD: 0 /100 WBCS — SIGNIFICANT CHANGE UP (ref 0–0)
NRBC # FLD: 0 K/UL — SIGNIFICANT CHANGE UP (ref 0–0)
PH UR: 6 — SIGNIFICANT CHANGE UP (ref 5–8)
PH UR: 6 — SIGNIFICANT CHANGE UP (ref 5–8)
PLATELET # BLD AUTO: 364 K/UL — SIGNIFICANT CHANGE UP (ref 150–400)
PLATELET # BLD AUTO: 364 K/UL — SIGNIFICANT CHANGE UP (ref 150–400)
PLATELET # BLD AUTO: 389 K/UL — SIGNIFICANT CHANGE UP (ref 150–400)
PLATELET # BLD AUTO: 389 K/UL — SIGNIFICANT CHANGE UP (ref 150–400)
POTASSIUM SERPL-MCNC: 3.9 MMOL/L — SIGNIFICANT CHANGE UP (ref 3.5–5.3)
POTASSIUM SERPL-MCNC: 3.9 MMOL/L — SIGNIFICANT CHANGE UP (ref 3.5–5.3)
POTASSIUM SERPL-SCNC: 3.9 MMOL/L — SIGNIFICANT CHANGE UP (ref 3.5–5.3)
POTASSIUM SERPL-SCNC: 3.9 MMOL/L — SIGNIFICANT CHANGE UP (ref 3.5–5.3)
PROT ?TM UR-MCNC: 45 MG/DL — SIGNIFICANT CHANGE UP
PROT SERPL-MCNC: 6.6 G/DL — SIGNIFICANT CHANGE UP (ref 6–8.3)
PROT SERPL-MCNC: 6.6 G/DL — SIGNIFICANT CHANGE UP (ref 6–8.3)
PROT UR-MCNC: 30 MG/DL
PROT UR-MCNC: 30 MG/DL
PROT/CREAT UR-RTO: 0.3 RATIO — HIGH (ref 0–0.2)
PROT/CREAT UR-RTO: 0.3 RATIO — HIGH (ref 0–0.2)
PROTHROM AB SERPL-ACNC: 10.7 SEC — SIGNIFICANT CHANGE UP (ref 9.5–13)
PROTHROM AB SERPL-ACNC: 10.7 SEC — SIGNIFICANT CHANGE UP (ref 9.5–13)
RBC # BLD: 3.93 M/UL — SIGNIFICANT CHANGE UP (ref 3.8–5.2)
RBC # BLD: 3.93 M/UL — SIGNIFICANT CHANGE UP (ref 3.8–5.2)
RBC # BLD: 4.43 M/UL — SIGNIFICANT CHANGE UP (ref 3.8–5.2)
RBC # BLD: 4.43 M/UL — SIGNIFICANT CHANGE UP (ref 3.8–5.2)
RBC # FLD: 15.4 % — HIGH (ref 10.3–14.5)
RBC # FLD: 15.4 % — HIGH (ref 10.3–14.5)
RBC # FLD: 15.6 % — HIGH (ref 10.3–14.5)
RBC # FLD: 15.6 % — HIGH (ref 10.3–14.5)
RBC CASTS # UR COMP ASSIST: 44 /HPF — HIGH (ref 0–4)
RBC CASTS # UR COMP ASSIST: 44 /HPF — HIGH (ref 0–4)
REVIEW: SIGNIFICANT CHANGE UP
REVIEW: SIGNIFICANT CHANGE UP
RH IG SCN BLD-IMP: POSITIVE — SIGNIFICANT CHANGE UP
RH IG SCN BLD-IMP: POSITIVE — SIGNIFICANT CHANGE UP
SODIUM SERPL-SCNC: 136 MMOL/L — SIGNIFICANT CHANGE UP (ref 135–145)
SODIUM SERPL-SCNC: 136 MMOL/L — SIGNIFICANT CHANGE UP (ref 135–145)
SP GR SPEC: 1.02 — SIGNIFICANT CHANGE UP (ref 1–1.03)
SP GR SPEC: 1.02 — SIGNIFICANT CHANGE UP (ref 1–1.03)
SQUAMOUS # UR AUTO: 6 /HPF — HIGH (ref 0–5)
SQUAMOUS # UR AUTO: 6 /HPF — HIGH (ref 0–5)
T PALLIDUM AB TITR SER: NEGATIVE — SIGNIFICANT CHANGE UP
T PALLIDUM AB TITR SER: NEGATIVE — SIGNIFICANT CHANGE UP
URATE SERPL-MCNC: 6.1 MG/DL — SIGNIFICANT CHANGE UP (ref 2.5–7)
URATE SERPL-MCNC: 6.1 MG/DL — SIGNIFICANT CHANGE UP (ref 2.5–7)
UROBILINOGEN FLD QL: 1 MG/DL — SIGNIFICANT CHANGE UP (ref 0.2–1)
UROBILINOGEN FLD QL: 1 MG/DL — SIGNIFICANT CHANGE UP (ref 0.2–1)
WBC # BLD: 10.34 K/UL — SIGNIFICANT CHANGE UP (ref 3.8–10.5)
WBC # BLD: 10.34 K/UL — SIGNIFICANT CHANGE UP (ref 3.8–10.5)
WBC # BLD: 9.06 K/UL — SIGNIFICANT CHANGE UP (ref 3.8–10.5)
WBC # BLD: 9.06 K/UL — SIGNIFICANT CHANGE UP (ref 3.8–10.5)
WBC # FLD AUTO: 10.34 K/UL — SIGNIFICANT CHANGE UP (ref 3.8–10.5)
WBC # FLD AUTO: 10.34 K/UL — SIGNIFICANT CHANGE UP (ref 3.8–10.5)
WBC # FLD AUTO: 9.06 K/UL — SIGNIFICANT CHANGE UP (ref 3.8–10.5)
WBC # FLD AUTO: 9.06 K/UL — SIGNIFICANT CHANGE UP (ref 3.8–10.5)
WBC UR QL: 8 /HPF — HIGH (ref 0–5)
WBC UR QL: 8 /HPF — HIGH (ref 0–5)

## 2023-11-09 RX ORDER — OXYTOCIN 10 UNIT/ML
VIAL (ML) INJECTION
Qty: 30 | Refills: 0 | Status: DISCONTINUED | OUTPATIENT
Start: 2023-11-09 | End: 2023-11-10

## 2023-11-09 RX ORDER — SODIUM CHLORIDE 9 MG/ML
1000 INJECTION, SOLUTION INTRAVENOUS
Refills: 0 | Status: DISCONTINUED | OUTPATIENT
Start: 2023-11-09 | End: 2023-11-10

## 2023-11-09 RX ORDER — SODIUM CHLORIDE 9 MG/ML
1000 INJECTION INTRAMUSCULAR; INTRAVENOUS; SUBCUTANEOUS
Refills: 0 | Status: DISCONTINUED | OUTPATIENT
Start: 2023-11-09 | End: 2023-11-10

## 2023-11-09 RX ORDER — CHLORHEXIDINE GLUCONATE 213 G/1000ML
1 SOLUTION TOPICAL DAILY
Refills: 0 | Status: DISCONTINUED | OUTPATIENT
Start: 2023-11-09 | End: 2023-11-10

## 2023-11-09 RX ORDER — AMPICILLIN TRIHYDRATE 250 MG
2 CAPSULE ORAL ONCE
Refills: 0 | Status: COMPLETED | OUTPATIENT
Start: 2023-11-09 | End: 2023-11-09

## 2023-11-09 RX ORDER — OXYTOCIN 10 UNIT/ML
333.33 VIAL (ML) INJECTION
Qty: 20 | Refills: 0 | Status: DISCONTINUED | OUTPATIENT
Start: 2023-11-09 | End: 2023-11-10

## 2023-11-09 RX ORDER — CITRIC ACID/SODIUM CITRATE 300-500 MG
15 SOLUTION, ORAL ORAL EVERY 6 HOURS
Refills: 0 | Status: DISCONTINUED | OUTPATIENT
Start: 2023-11-09 | End: 2023-11-10

## 2023-11-09 RX ORDER — AMPICILLIN TRIHYDRATE 250 MG
1 CAPSULE ORAL EVERY 4 HOURS
Refills: 0 | Status: DISCONTINUED | OUTPATIENT
Start: 2023-11-09 | End: 2023-11-10

## 2023-11-09 RX ADMIN — SODIUM CHLORIDE 125 MILLILITER(S): 9 INJECTION INTRAMUSCULAR; INTRAVENOUS; SUBCUTANEOUS at 06:45

## 2023-11-09 RX ADMIN — Medication 108 GRAM(S): at 19:28

## 2023-11-09 RX ADMIN — Medication 200 GRAM(S): at 06:55

## 2023-11-09 RX ADMIN — CHLORHEXIDINE GLUCONATE 1 APPLICATION(S): 213 SOLUTION TOPICAL at 06:01

## 2023-11-09 RX ADMIN — Medication 2 MILLIUNIT(S)/MIN: at 18:10

## 2023-11-09 RX ADMIN — Medication 108 GRAM(S): at 23:30

## 2023-11-09 RX ADMIN — SODIUM CHLORIDE 125 MILLILITER(S): 9 INJECTION, SOLUTION INTRAVENOUS at 15:28

## 2023-11-09 RX ADMIN — Medication 108 GRAM(S): at 15:25

## 2023-11-09 RX ADMIN — Medication 108 GRAM(S): at 11:11

## 2023-11-09 NOTE — OB PROVIDER H&P - ASSESSMENT
A/P: Pt is a 42 yo  at 38/4 weeks who presents for A2 IOL    1. Admit to LND. Routine Labs. IVF  2. IOL via vaginal cytotec and cervical balloon   3. Fetus: Cat 1 tracing, Vertex, EFW 2885 grams. C/w EFM.  4. GDMA2 rotating fluids and fingersticks per policy   5. GBS positive, ampicillin   6. Pain: IV pain meds/epidural PRN    d/w Dr. Radhames tijerina PA-C    A/P  44 yo  at 38/4 weeks IOL for poorly controlled GDMA2 on humilin 35 units QHS. GBS positive for prophylaxis   EFW 2885 grams   plan cervical balloon , Cytotec -vaginal  d/w Dr. Radhames tijerina PA-C

## 2023-11-09 NOTE — OB PROVIDER H&P - NSHPPHYSICALEXAM_GEN_ALL_CORE
Vital Signs Last 24 Hrs  T(C): 36.7 (09 Nov 2023 05:40), Max: 36.7 (08 Nov 2023 13:59)  T(F): 98 (09 Nov 2023 05:40), Max: 98.1 (08 Nov 2023 13:59)  HR: 97 (09 Nov 2023 06:01) (87 - 109)  BP: 132/81 (09 Nov 2023 06:01) (110/65 - 137/94)  BP(mean): --  RR: 18 (09 Nov 2023 05:40) (18 - 18)  SpO2: 99% (09 Nov 2023 06:01) (99% - 99%)    Parameters below as of 09 Nov 2023 05:40  Patient On (Oxygen Delivery Method): room air        Physical Exam:  Gen: NAD, AOx3  CV: RR  Resp: unlabored respirations  Abd: soft, NT, ND      SVE: 0/50/-3  FHT: 140/mod variability/+accels/no decels  [place]: irregular   EFW: 2885 grams   Sono: fetal presentation, cephalic

## 2023-11-09 NOTE — OB RN PATIENT PROFILE - FALL HARM RISK - UNIVERSAL INTERVENTIONS
Bed in lowest position, wheels locked, appropriate side rails in place/Call bell, personal items and telephone in reach/Instruct patient to call for assistance before getting out of bed or chair/Non-slip footwear when patient is out of bed/[place] to call system/Physically safe environment - no spills, clutter or unnecessary equipment/Purposeful Proactive Rounding/Room/bathroom lighting operational, light cord in reach

## 2023-11-09 NOTE — OB PROVIDER H&P - HISTORY OF PRESENT ILLNESS
44 yo  at 38/4 weeks IOL for poorly controlled GDMA2 on humilin 35 units QHS. fingerstick on admission 107.  IVF pregnancy   GBS positive   EFW 2885 grams   pt reports good fetal movement. denies LOF/VB, contractions

## 2023-11-09 NOTE — OB PROVIDER IHI INDUCTION/AUGMENTATION NOTE - NS_PELVIS_OBGYN_ALL_OB
Adequate Xenograft Text: The defect edges were debeveled with a #15 scalpel blade.  Given the location of the defect, shape of the defect and the proximity to free margins a xenograft was deemed most appropriate.  The graft was then trimmed to fit the size of the defect.  The graft was then placed in the primary defect and oriented appropriately.

## 2023-11-09 NOTE — OB PROVIDER H&P - ATTENDING COMMENTS
42 yo  at 38.4 weeks Induction of labor for poorly controlled GDMA2 on humilin 35 units QHS.   GBS positive for ampicillin prophylaxis   IOL - Cervical nieto balloon, vaginal Cytotec

## 2023-11-09 NOTE — OB PROVIDER H&P - NSLOWPPHRISK_OBGYN_A_OB
No previous uterine incision/[place]avez Pregnancy/Less than or equal to 4 previous vaginal births/No known bleeding disorder/No history of postpartum hemorrhage/No other PPH risks indicated

## 2023-11-10 LAB
ALBUMIN SERPL ELPH-MCNC: 2.6 G/DL — LOW (ref 3.3–5)
ALBUMIN SERPL ELPH-MCNC: 2.6 G/DL — LOW (ref 3.3–5)
ALP SERPL-CCNC: 210 U/L — HIGH (ref 40–120)
ALP SERPL-CCNC: 210 U/L — HIGH (ref 40–120)
ALT FLD-CCNC: 10 U/L — SIGNIFICANT CHANGE UP (ref 4–33)
ALT FLD-CCNC: 10 U/L — SIGNIFICANT CHANGE UP (ref 4–33)
ANION GAP SERPL CALC-SCNC: 13 MMOL/L — SIGNIFICANT CHANGE UP (ref 7–14)
ANION GAP SERPL CALC-SCNC: 13 MMOL/L — SIGNIFICANT CHANGE UP (ref 7–14)
APTT BLD: 29.5 SEC — SIGNIFICANT CHANGE UP (ref 24.5–35.6)
APTT BLD: 29.5 SEC — SIGNIFICANT CHANGE UP (ref 24.5–35.6)
AST SERPL-CCNC: 21 U/L — SIGNIFICANT CHANGE UP (ref 4–32)
AST SERPL-CCNC: 21 U/L — SIGNIFICANT CHANGE UP (ref 4–32)
BASOPHILS # BLD AUTO: 0.03 K/UL — SIGNIFICANT CHANGE UP (ref 0–0.2)
BASOPHILS # BLD AUTO: 0.03 K/UL — SIGNIFICANT CHANGE UP (ref 0–0.2)
BASOPHILS NFR BLD AUTO: 0.2 % — SIGNIFICANT CHANGE UP (ref 0–2)
BASOPHILS NFR BLD AUTO: 0.2 % — SIGNIFICANT CHANGE UP (ref 0–2)
BILIRUB SERPL-MCNC: 0.3 MG/DL — SIGNIFICANT CHANGE UP (ref 0.2–1.2)
BILIRUB SERPL-MCNC: 0.3 MG/DL — SIGNIFICANT CHANGE UP (ref 0.2–1.2)
BUN SERPL-MCNC: 8 MG/DL — SIGNIFICANT CHANGE UP (ref 7–23)
BUN SERPL-MCNC: 8 MG/DL — SIGNIFICANT CHANGE UP (ref 7–23)
CALCIUM SERPL-MCNC: 8.1 MG/DL — LOW (ref 8.4–10.5)
CALCIUM SERPL-MCNC: 8.1 MG/DL — LOW (ref 8.4–10.5)
CHLORIDE SERPL-SCNC: 105 MMOL/L — SIGNIFICANT CHANGE UP (ref 98–107)
CHLORIDE SERPL-SCNC: 105 MMOL/L — SIGNIFICANT CHANGE UP (ref 98–107)
CO2 SERPL-SCNC: 16 MMOL/L — LOW (ref 22–31)
CO2 SERPL-SCNC: 16 MMOL/L — LOW (ref 22–31)
CREAT SERPL-MCNC: 0.8 MG/DL — SIGNIFICANT CHANGE UP (ref 0.5–1.3)
CREAT SERPL-MCNC: 0.8 MG/DL — SIGNIFICANT CHANGE UP (ref 0.5–1.3)
EGFR: 94 ML/MIN/1.73M2 — SIGNIFICANT CHANGE UP
EGFR: 94 ML/MIN/1.73M2 — SIGNIFICANT CHANGE UP
EOSINOPHIL # BLD AUTO: 0 K/UL — SIGNIFICANT CHANGE UP (ref 0–0.5)
EOSINOPHIL # BLD AUTO: 0 K/UL — SIGNIFICANT CHANGE UP (ref 0–0.5)
EOSINOPHIL NFR BLD AUTO: 0 % — SIGNIFICANT CHANGE UP (ref 0–6)
EOSINOPHIL NFR BLD AUTO: 0 % — SIGNIFICANT CHANGE UP (ref 0–6)
FIBRINOGEN PPP-MCNC: 505 MG/DL — HIGH (ref 200–465)
FIBRINOGEN PPP-MCNC: 505 MG/DL — HIGH (ref 200–465)
GLUCOSE BLDC GLUCOMTR-MCNC: 104 MG/DL — HIGH (ref 70–99)
GLUCOSE BLDC GLUCOMTR-MCNC: 92 MG/DL — SIGNIFICANT CHANGE UP (ref 70–99)
GLUCOSE BLDC GLUCOMTR-MCNC: 92 MG/DL — SIGNIFICANT CHANGE UP (ref 70–99)
GLUCOSE SERPL-MCNC: 135 MG/DL — HIGH (ref 70–99)
GLUCOSE SERPL-MCNC: 135 MG/DL — HIGH (ref 70–99)
HCT VFR BLD CALC: 34.9 % — SIGNIFICANT CHANGE UP (ref 34.5–45)
HCT VFR BLD CALC: 34.9 % — SIGNIFICANT CHANGE UP (ref 34.5–45)
HGB BLD-MCNC: 11.1 G/DL — LOW (ref 11.5–15.5)
HGB BLD-MCNC: 11.1 G/DL — LOW (ref 11.5–15.5)
IANC: 15 K/UL — HIGH (ref 1.8–7.4)
IANC: 15 K/UL — HIGH (ref 1.8–7.4)
IMM GRANULOCYTES NFR BLD AUTO: 0.5 % — SIGNIFICANT CHANGE UP (ref 0–0.9)
IMM GRANULOCYTES NFR BLD AUTO: 0.5 % — SIGNIFICANT CHANGE UP (ref 0–0.9)
INR BLD: <0.9 RATIO — SIGNIFICANT CHANGE UP (ref 0.85–1.18)
INR BLD: <0.9 RATIO — SIGNIFICANT CHANGE UP (ref 0.85–1.18)
LDH SERPL L TO P-CCNC: 288 U/L — HIGH (ref 135–225)
LDH SERPL L TO P-CCNC: 288 U/L — HIGH (ref 135–225)
LYMPHOCYTES # BLD AUTO: 1.09 K/UL — SIGNIFICANT CHANGE UP (ref 1–3.3)
LYMPHOCYTES # BLD AUTO: 1.09 K/UL — SIGNIFICANT CHANGE UP (ref 1–3.3)
LYMPHOCYTES # BLD AUTO: 6.5 % — LOW (ref 13–44)
LYMPHOCYTES # BLD AUTO: 6.5 % — LOW (ref 13–44)
MAGNESIUM SERPL-MCNC: 4.8 MG/DL — HIGH (ref 1.6–2.6)
MAGNESIUM SERPL-MCNC: 4.8 MG/DL — HIGH (ref 1.6–2.6)
MAGNESIUM SERPL-MCNC: 6.4 MG/DL — HIGH (ref 1.6–2.6)
MAGNESIUM SERPL-MCNC: 6.4 MG/DL — HIGH (ref 1.6–2.6)
MAGNESIUM SERPL-MCNC: 7.4 MG/DL — CRITICAL HIGH (ref 1.6–2.6)
MAGNESIUM SERPL-MCNC: 7.4 MG/DL — CRITICAL HIGH (ref 1.6–2.6)
MCHC RBC-ENTMCNC: 25.3 PG — LOW (ref 27–34)
MCHC RBC-ENTMCNC: 25.3 PG — LOW (ref 27–34)
MCHC RBC-ENTMCNC: 31.8 GM/DL — LOW (ref 32–36)
MCHC RBC-ENTMCNC: 31.8 GM/DL — LOW (ref 32–36)
MCV RBC AUTO: 79.7 FL — LOW (ref 80–100)
MCV RBC AUTO: 79.7 FL — LOW (ref 80–100)
MONOCYTES # BLD AUTO: 0.49 K/UL — SIGNIFICANT CHANGE UP (ref 0–0.9)
MONOCYTES # BLD AUTO: 0.49 K/UL — SIGNIFICANT CHANGE UP (ref 0–0.9)
MONOCYTES NFR BLD AUTO: 2.9 % — SIGNIFICANT CHANGE UP (ref 2–14)
MONOCYTES NFR BLD AUTO: 2.9 % — SIGNIFICANT CHANGE UP (ref 2–14)
NEUTROPHILS # BLD AUTO: 15 K/UL — HIGH (ref 1.8–7.4)
NEUTROPHILS # BLD AUTO: 15 K/UL — HIGH (ref 1.8–7.4)
NEUTROPHILS NFR BLD AUTO: 89.9 % — HIGH (ref 43–77)
NEUTROPHILS NFR BLD AUTO: 89.9 % — HIGH (ref 43–77)
NRBC # BLD: 0 /100 WBCS — SIGNIFICANT CHANGE UP (ref 0–0)
NRBC # BLD: 0 /100 WBCS — SIGNIFICANT CHANGE UP (ref 0–0)
NRBC # FLD: 0 K/UL — SIGNIFICANT CHANGE UP (ref 0–0)
NRBC # FLD: 0 K/UL — SIGNIFICANT CHANGE UP (ref 0–0)
PLATELET # BLD AUTO: 356 K/UL — SIGNIFICANT CHANGE UP (ref 150–400)
PLATELET # BLD AUTO: 356 K/UL — SIGNIFICANT CHANGE UP (ref 150–400)
POTASSIUM SERPL-MCNC: 4.2 MMOL/L — SIGNIFICANT CHANGE UP (ref 3.5–5.3)
POTASSIUM SERPL-MCNC: 4.2 MMOL/L — SIGNIFICANT CHANGE UP (ref 3.5–5.3)
POTASSIUM SERPL-SCNC: 4.2 MMOL/L — SIGNIFICANT CHANGE UP (ref 3.5–5.3)
POTASSIUM SERPL-SCNC: 4.2 MMOL/L — SIGNIFICANT CHANGE UP (ref 3.5–5.3)
PROT SERPL-MCNC: 6.1 G/DL — SIGNIFICANT CHANGE UP (ref 6–8.3)
PROT SERPL-MCNC: 6.1 G/DL — SIGNIFICANT CHANGE UP (ref 6–8.3)
PROTHROM AB SERPL-ACNC: 10 SEC — SIGNIFICANT CHANGE UP (ref 9.5–13)
PROTHROM AB SERPL-ACNC: 10 SEC — SIGNIFICANT CHANGE UP (ref 9.5–13)
RBC # BLD: 4.38 M/UL — SIGNIFICANT CHANGE UP (ref 3.8–5.2)
RBC # BLD: 4.38 M/UL — SIGNIFICANT CHANGE UP (ref 3.8–5.2)
RBC # FLD: 15.3 % — HIGH (ref 10.3–14.5)
RBC # FLD: 15.3 % — HIGH (ref 10.3–14.5)
SODIUM SERPL-SCNC: 134 MMOL/L — LOW (ref 135–145)
SODIUM SERPL-SCNC: 134 MMOL/L — LOW (ref 135–145)
URATE SERPL-MCNC: 6.2 MG/DL — SIGNIFICANT CHANGE UP (ref 2.5–7)
URATE SERPL-MCNC: 6.2 MG/DL — SIGNIFICANT CHANGE UP (ref 2.5–7)
WBC # BLD: 16.69 K/UL — HIGH (ref 3.8–10.5)
WBC # BLD: 16.69 K/UL — HIGH (ref 3.8–10.5)
WBC # FLD AUTO: 16.69 K/UL — HIGH (ref 3.8–10.5)
WBC # FLD AUTO: 16.69 K/UL — HIGH (ref 3.8–10.5)

## 2023-11-10 PROCEDURE — 59514 CESAREAN DELIVERY ONLY: CPT | Mod: AS,U8

## 2023-11-10 DEVICE — INTERCEED 3 X 4": Type: IMPLANTABLE DEVICE | Status: FUNCTIONAL

## 2023-11-10 DEVICE — ARISTA 1GR: Type: IMPLANTABLE DEVICE | Status: FUNCTIONAL

## 2023-11-10 RX ORDER — MAGNESIUM SULFATE 500 MG/ML
4 VIAL (ML) INJECTION ONCE
Refills: 0 | Status: COMPLETED | OUTPATIENT
Start: 2023-11-10 | End: 2023-11-10

## 2023-11-10 RX ORDER — NALOXONE HYDROCHLORIDE 4 MG/.1ML
0.1 SPRAY NASAL
Refills: 0 | Status: DISCONTINUED | OUTPATIENT
Start: 2023-11-10 | End: 2023-11-13

## 2023-11-10 RX ORDER — SODIUM CHLORIDE 9 MG/ML
1000 INJECTION, SOLUTION INTRAVENOUS
Refills: 0 | Status: DISCONTINUED | OUTPATIENT
Start: 2023-11-10 | End: 2023-11-13

## 2023-11-10 RX ORDER — ACETAMINOPHEN 500 MG
975 TABLET ORAL
Refills: 0 | Status: DISCONTINUED | OUTPATIENT
Start: 2023-11-10 | End: 2023-11-13

## 2023-11-10 RX ORDER — OXYTOCIN 10 UNIT/ML
333.33 VIAL (ML) INJECTION
Qty: 20 | Refills: 0 | Status: DISCONTINUED | OUTPATIENT
Start: 2023-11-10 | End: 2023-11-10

## 2023-11-10 RX ORDER — TETANUS TOXOID, REDUCED DIPHTHERIA TOXOID AND ACELLULAR PERTUSSIS VACCINE, ADSORBED 5; 2.5; 8; 8; 2.5 [IU]/.5ML; [IU]/.5ML; UG/.5ML; UG/.5ML; UG/.5ML
0.5 SUSPENSION INTRAMUSCULAR ONCE
Refills: 0 | Status: DISCONTINUED | OUTPATIENT
Start: 2023-11-10 | End: 2023-11-13

## 2023-11-10 RX ORDER — OXYTOCIN 10 UNIT/ML
333.33 VIAL (ML) INJECTION
Qty: 20 | Refills: 0 | Status: DISCONTINUED | OUTPATIENT
Start: 2023-11-10 | End: 2023-11-13

## 2023-11-10 RX ORDER — HEPARIN SODIUM 5000 [USP'U]/ML
10000 INJECTION INTRAVENOUS; SUBCUTANEOUS EVERY 12 HOURS
Refills: 0 | Status: DISCONTINUED | OUTPATIENT
Start: 2023-11-10 | End: 2023-11-13

## 2023-11-10 RX ORDER — OXYCODONE HYDROCHLORIDE 5 MG/1
5 TABLET ORAL
Refills: 0 | Status: DISCONTINUED | OUTPATIENT
Start: 2023-11-10 | End: 2023-11-13

## 2023-11-10 RX ORDER — DEXAMETHASONE 0.5 MG/5ML
4 ELIXIR ORAL EVERY 6 HOURS
Refills: 0 | Status: DISCONTINUED | OUTPATIENT
Start: 2023-11-10 | End: 2023-11-13

## 2023-11-10 RX ORDER — SODIUM CHLORIDE 9 MG/ML
1000 INJECTION, SOLUTION INTRAVENOUS ONCE
Refills: 0 | Status: DISCONTINUED | OUTPATIENT
Start: 2023-11-10 | End: 2023-11-10

## 2023-11-10 RX ORDER — NALBUPHINE HYDROCHLORIDE 10 MG/ML
2.5 INJECTION, SOLUTION INTRAMUSCULAR; INTRAVENOUS; SUBCUTANEOUS EVERY 6 HOURS
Refills: 0 | Status: DISCONTINUED | OUTPATIENT
Start: 2023-11-10 | End: 2023-11-13

## 2023-11-10 RX ORDER — OXYCODONE HYDROCHLORIDE 5 MG/1
5 TABLET ORAL ONCE
Refills: 0 | Status: DISCONTINUED | OUTPATIENT
Start: 2023-11-10 | End: 2023-11-13

## 2023-11-10 RX ORDER — ONDANSETRON 8 MG/1
4 TABLET, FILM COATED ORAL EVERY 6 HOURS
Refills: 0 | Status: DISCONTINUED | OUTPATIENT
Start: 2023-11-10 | End: 2023-11-13

## 2023-11-10 RX ORDER — NIFEDIPINE 30 MG
30 TABLET, EXTENDED RELEASE 24 HR ORAL DAILY
Refills: 0 | Status: DISCONTINUED | OUTPATIENT
Start: 2023-11-10 | End: 2023-11-10

## 2023-11-10 RX ORDER — MAGNESIUM SULFATE 500 MG/ML
2 VIAL (ML) INJECTION
Qty: 40 | Refills: 0 | Status: DISCONTINUED | OUTPATIENT
Start: 2023-11-10 | End: 2023-11-10

## 2023-11-10 RX ORDER — SODIUM CHLORIDE 9 MG/ML
1000 INJECTION, SOLUTION INTRAVENOUS
Refills: 0 | Status: DISCONTINUED | OUTPATIENT
Start: 2023-11-10 | End: 2023-11-10

## 2023-11-10 RX ORDER — DIPHENOXYLATE HCL/ATROPINE 2.5-.025MG
2 TABLET ORAL ONCE
Refills: 0 | Status: DISCONTINUED | OUTPATIENT
Start: 2023-11-10 | End: 2023-11-10

## 2023-11-10 RX ORDER — NIFEDIPINE 30 MG
10 TABLET, EXTENDED RELEASE 24 HR ORAL ONCE
Refills: 0 | Status: COMPLETED | OUTPATIENT
Start: 2023-11-10 | End: 2023-11-10

## 2023-11-10 RX ORDER — SODIUM CHLORIDE 9 MG/ML
500 INJECTION, SOLUTION INTRAVENOUS ONCE
Refills: 0 | Status: DISCONTINUED | OUTPATIENT
Start: 2023-11-10 | End: 2023-11-13

## 2023-11-10 RX ORDER — CITRIC ACID/SODIUM CITRATE 300-500 MG
30 SOLUTION, ORAL ORAL ONCE
Refills: 0 | Status: DISCONTINUED | OUTPATIENT
Start: 2023-11-10 | End: 2023-11-10

## 2023-11-10 RX ORDER — CEFAZOLIN SODIUM 1 G
2000 VIAL (EA) INJECTION EVERY 8 HOURS
Refills: 0 | Status: COMPLETED | OUTPATIENT
Start: 2023-11-10 | End: 2023-11-11

## 2023-11-10 RX ORDER — LANOLIN
1 OINTMENT (GRAM) TOPICAL EVERY 6 HOURS
Refills: 0 | Status: DISCONTINUED | OUTPATIENT
Start: 2023-11-10 | End: 2023-11-13

## 2023-11-10 RX ORDER — DIPHENHYDRAMINE HCL 50 MG
25 CAPSULE ORAL EVERY 6 HOURS
Refills: 0 | Status: DISCONTINUED | OUTPATIENT
Start: 2023-11-10 | End: 2023-11-13

## 2023-11-10 RX ORDER — MAGNESIUM SULFATE 500 MG/ML
2 VIAL (ML) INJECTION
Qty: 40 | Refills: 0 | Status: DISCONTINUED | OUTPATIENT
Start: 2023-11-10 | End: 2023-11-11

## 2023-11-10 RX ORDER — IBUPROFEN 200 MG
600 TABLET ORAL EVERY 6 HOURS
Refills: 0 | Status: COMPLETED | OUTPATIENT
Start: 2023-11-10 | End: 2024-10-08

## 2023-11-10 RX ORDER — KETOROLAC TROMETHAMINE 30 MG/ML
30 SYRINGE (ML) INJECTION EVERY 6 HOURS
Refills: 0 | Status: COMPLETED | OUTPATIENT
Start: 2023-11-10 | End: 2023-11-11

## 2023-11-10 RX ORDER — SIMETHICONE 80 MG/1
80 TABLET, CHEWABLE ORAL EVERY 4 HOURS
Refills: 0 | Status: DISCONTINUED | OUTPATIENT
Start: 2023-11-10 | End: 2023-11-13

## 2023-11-10 RX ORDER — FAMOTIDINE 10 MG/ML
20 INJECTION INTRAVENOUS ONCE
Refills: 0 | Status: DISCONTINUED | OUTPATIENT
Start: 2023-11-10 | End: 2023-11-10

## 2023-11-10 RX ORDER — MAGNESIUM HYDROXIDE 400 MG/1
30 TABLET, CHEWABLE ORAL
Refills: 0 | Status: DISCONTINUED | OUTPATIENT
Start: 2023-11-10 | End: 2023-11-13

## 2023-11-10 RX ADMIN — Medication 30 MILLIGRAM(S): at 02:09

## 2023-11-10 RX ADMIN — Medication 108 GRAM(S): at 12:42

## 2023-11-10 RX ADMIN — Medication 50 GM/HR: at 07:25

## 2023-11-10 RX ADMIN — FAMOTIDINE 20 MILLIGRAM(S): 10 INJECTION INTRAVENOUS at 12:55

## 2023-11-10 RX ADMIN — Medication 100 MILLIGRAM(S): at 21:34

## 2023-11-10 RX ADMIN — Medication 1000 MILLIUNIT(S)/MIN: at 15:52

## 2023-11-10 RX ADMIN — Medication 30 MILLILITER(S): at 12:55

## 2023-11-10 RX ADMIN — Medication 2 TABLET(S): at 13:50

## 2023-11-10 RX ADMIN — Medication 108 GRAM(S): at 04:11

## 2023-11-10 RX ADMIN — Medication 50 GM/HR: at 22:36

## 2023-11-10 RX ADMIN — CHLORHEXIDINE GLUCONATE 1 APPLICATION(S): 213 SOLUTION TOPICAL at 06:32

## 2023-11-10 RX ADMIN — Medication 108 GRAM(S): at 08:03

## 2023-11-10 RX ADMIN — HEPARIN SODIUM 10000 UNIT(S): 5000 INJECTION INTRAVENOUS; SUBCUTANEOUS at 21:39

## 2023-11-10 RX ADMIN — Medication 50 GM/HR: at 02:25

## 2023-11-10 RX ADMIN — Medication 50 GM/HR: at 15:51

## 2023-11-10 RX ADMIN — Medication 10 MILLIGRAM(S): at 02:01

## 2023-11-10 RX ADMIN — Medication 300 GRAM(S): at 02:01

## 2023-11-10 NOTE — BRIEF OPERATIVE NOTE - OPERATION/FINDINGS
Viable female infant, apgar 9/9, weight 2560 gms  delivered @13:33  cephalic presentation, deflexed head at OP presentation, clear copious fluid noted  body cord x 1,   hysterotomy closed in 2 layers using Caprosyn suture  TXA, Hemabate, Lomotil administered for atonic uterus.   Jennifer and Interceed placed over hysterotomy, rectus layer reapproximated over the bladder, fascia layer closed using 0-Vicryl suture.  uterus with multiple small subserosal myomas, otherwise tubes & ovaries are wnl.    QBL: 511 cc  UOP: 125 cc  IVF: 1500 cc  Dictation Number: [id number]

## 2023-11-10 NOTE — BRIEF OPERATIVE NOTE - NSICDXBRIEFPOSTOP_GEN_ALL_CORE_FT
POST-OP DIAGNOSIS:  Delivery by  section using transverse incision of lower segment of uterus 10-Nov-2023 15:24:35  Allyson Gibson  Severe preeclampsia, third trimester 10-Nov-2023 15:24:46  Allyson Gibson  GDM, class A2 10-Nov-2023 15:24:55  Allyson Gibson

## 2023-11-10 NOTE — OB PROVIDER DELIVERY SUMMARY - NSSELHIDDEN_OBGYN_ALL_OB_FT
[NS_DeliveryAttending1_OBGYN_ALL_OB_FT:[id number]],[NS_DeliveryRN_OBGYN_ALL_OB_FT:[id number]],[NS_DeliveryAssist1_OBGYN_ALL_OB_FT:[id number]]

## 2023-11-10 NOTE — OB NEONATOLOGY/PEDIATRICIAN DELIVERY SUMMARY - NS_BIRTHTRAUMAA_OBGYN_ALL_OB
6/11/2021      RE: Anshu Root  [address]  [facility] [address]       Anshu Root is a 10 year old 11 month old male with a history of acute lymphoblastic leukemia, diagnosed in January of 2016.  Anshu was treated according to [facility] protocol [id number], he comes to clinic today for his routine cancer survivorship visit.    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  CNS negative.  He was treated as per the [facility] protocol [id number] from 1/27/2016 until 4/1/2019.  He was on study for induction only and then off the remainder of the study.  He received the following chemotherapy:  1.  Vincristine IV  2.  Cytarabine IT with a cumulative dose of 99 mg/m2  3.  PEG-Asparaginase IV  4.  Dexamethasone PO  5.  Methotrexate IT with a cumulative dose of 339 mg/m2  6.  Methotrexate PO  7.  Methotrexate IV with a cumulative dose of 2.5 GM/m2  8.  6- Mercaptopurine PO  9.  Cyclophosphamide IV with a cumulative dose of 1 GM/m2  10. 6-Thioguanine PO  11.  Doxorubicin IV with a cumulative dose of 75 mg/m2  12.  Cytarabine IV with a cumulative dose of 600 mg/m2    Anshu DID NOT receive radiation therapy    Late effects known to date include:  1.  Peripheral motor neuropathy- 2/2016 and resolved  2.  ADHD- dx 8/2016, on going  3.  Problems with fine motor skills    History of Present Illness:  Anshu is here today with his mom.  He has been doing well since his last visit.  He was diagnosed with ADHD a few years ago and his mom is considering trialing medications.  They are going to make a PCP appt soon but haven't done that yet.   He has been having more issues with focusing at home and school.  He finished school yesterday for the year and was able to graduate out of his extra reading help.  He is also doing better in Math.  He does have an IEP.   He is very active and enjoys biking, riding his scooter, ice skating, fishing  "and also playing video games.  No significant illnesses.  He did not get a flu vaccine.  He did have 2 cavities recently.  No bruising and bleeding.  No HA.  No bone pain.  No rashes.  No N/V/D/C.      Review of systems:  General:   Good appetite, strong energy level, sleeping well.  No fever, no pain.  HEENT:  No changes in vision or hearing.  No headache.  No rhinorrhea.     Respiratory: No SOB.  No coughing or wheezing.  Cardiovascular: No chest pain or palpitations  Endocrine:  No hot/cold intolerance.  No increase thirst or urination.  GI:  No nausea, vomiting, diarrhea or constipation.  No abdominal pain.  : No difficulty with urination.  Skin: No rashes, bruises, petechiae or other skin lesions noted.  Neuro: No weakness or numbness.  MSK:  No change in ROM or function.    PMH:   Past Medical History:   Diagnosis Date     ADHD (attention deficit hyperactivity disorder)      ALL (acute lymphoblastic leukemia) (H) 1/2016    B-cell     Decreased strength      PONV (postoperative nausea and vomiting)      S/P chemotherapy, time since 4-12 weeks      Vitamin D deficiency        PFMH:   Family History   Problem Relation Age of Onset     Attention Deficit Disorder Mother      Attention Deficit Disorder Maternal Grandfather          Social History: Anshu lives with his parents and sister.  He completed 3rd grade and has an IEP at school.    Current Medications: has a current medication list which includes the following prescription(s): acetaminophen and lidocaine-prilocaine.    Physical Exam: /76   Pulse 93   Temp 97.9  F (36.6  C) (Axillary)   Resp 18   Ht 1.391 m (4' 6.76\")   Wt 35.5 kg (78 lb 4.2 oz)   SpO2 98%   BMI 18.35 kg/m       Wt Readings from Last 3 Encounters:   06/11/21 35.5 kg (78 lb 4.2 oz) (72 %, Z= 0.59)*   03/16/21 35.4 kg (78 lb 0.7 oz) (76 %, Z= 0.72)*   12/01/20 33.2 kg (73 lb 3.1 oz) (72 %, Z= 0.58)*     * Growth percentiles are based on [facility] (Boys, 2-20 Years) data.     Ht Readings " "from Last 2 Encounters:   06/11/21 1.391 m (4' 6.76\") (55 %, Z= 0.12)*   03/16/21 1.377 m (4' 6.2\") (53 %, Z= 0.08)*     * Growth percentiles are based on [facility] (Boys, 2-20 Years) data.     77 %ile (Z= 0.74) based on CDC (Boys, 2-20 Years) BMI-for-age based on BMI available as of 6/11/2021.    General: Anshu Root is alert, interactive and appropriate for age throughout exam.    Karnofsky/Lansky score is 100.  HEENT: Skull is atrauamatic and normocephalic. PERRLA, sclera are non icteric and not injected, EOM are intact.  Nares are patent without drainage.  Oropharynx is clear without exudate, erythema or lesions.  Tympanic membranes are opaque bilaterally with light reflex and landmarks present.  Lymph:  Neck is supple without lymphadenopathy.  There is no supraclavicular, axillary or inguinal lymphadenopathy palpated.  Cardiovascular:  HR is regular, S1, S2 no murmur.  Capillary refill is < 2 seconds.  There is no edema.  Respiratory: Respirations are easy.  Lungs are clear to auscultation through out.  No crackles or wheezes.  Gastrointestinal:  BS present in all quadrants.  Abdomen is soft and non-tender.  No hepatosplenomegaly or masses are palpated.  Genitourinary: deferred  Skin: No rashes, bruises or other skin lesions are noted.   Neurological:  DTR are present and equal at patellas bilaterally.  Gait is normal.  Sensation intact in hands and feet.  Musculoskeletal:  Good strength and ROM in all extremities.  Strong dorsiflexion at ankles bilaterally without any pain at the Achilles.    Labs:   Results for orders placed or performed in visit on 06/11/21   CBC with platelets differential     Status: Abnormal   Result Value Ref Range    WBC 6.1 5.0 - 14.5 10e9/L    RBC Count 4.91 3.7 - 5.3 10e12/L    Hemoglobin 13.8 10.5 - 14.0 g/dL    Hematocrit 40.2 31.5 - 43.0 %    MCV 82 70 - 100 fl    MCH 28.1 26.5 - 33.0 pg    MCHC 34.3 31.5 - 36.5 g/dL    RDW 12.9 10.0 - 15.0 %    Platelet Count 148 (L) 150 - 450 " "10e9/L    Diff Method Automated Method     % Neutrophils 49.4 %    % Lymphocytes 39.1 %    % Monocytes 7.6 %    % Eosinophils 3.0 %    % Basophils 0.7 %    % Immature Granulocytes 0.2 %    Nucleated RBCs 0 0 /100    Absolute Neutrophil 3.0 1.3 - 8.1 10e9/L    Absolute Lymphocytes 2.4 1.1 - 8.6 10e9/L    Absolute Monocytes 0.5 0.0 - 1.1 10e9/L    Absolute Eosinophils 0.2 0.0 - 0.7 10e9/L    Absolute Basophils 0.0 0.0 - 0.2 10e9/L    Abs Immature Granulocytes 0.0 0 - 0.4 10e9/L    Absolute Nucleated RBC 0.0          Radiology: INDICATION: ALL     COMPARISON: None     TECHNICAL: The patient was scanned using a GE Lunar Prodigy, with  pediatric software.     Age: 10 years 10 months  Gender: Male  Race/Ethnicity: White     FINDINGS:     Image quality: adequate  Height: 55 inches, ( 60 %)  Weight: 78.3 pounds     Densitometry results:     Spine L1-L4:  Chronological age Z-score: -1.1  Bone Mineral Density: 0.624 gm/cm2     Total Body Less Head:  Chronological age Z-score: -0.8  Bone Mineral Density: 0.701 gm/cm2     Body composition:  % body fat: 28.7%  Lean body mass for height centile: 24%                                                                      IMPRESSION:  1. Normal bone mineral density.  2. Moderate percent body fat.     Notes:  DXA     According to the ISCD October 2007 Position Statements at www.iscd.org   \"the diagnosis of osteoporosis in males and females ages 5 - 19  requires the presence of both a clinically significant fracture  history (one long bone fracture of the lower extremities, vertebral  compression fracture, or 2+ long bone fractures of the upper  extremities) and low bone mineral density. Low bone mineral density is  defined as BMD Z-score less than or equal to -2.0 adjusted for age,  gender and body size as appropriate.\"     The least significant change (LSC) for AP Spine = 2%     Body Composition     Cutoffs for Body Fatness (from Jackie et al. Arch Ped Adol " Med  2009;163(7):809):     Age, y      Normal       Moderate       Elevated     Boys  <9           <22%           22-26%           >26%  9-11.9     <24%           24-34%           >34%  12-14.9   <23%           23-32%           >32%  >=15       <22%           22-29%           >29%     Girls  <9           <27%           27-34%           >34%  9-11.9     <30%           30-37%           >37%  12-14.9   <32%           32-39%           >39%  >=15       <36%           36-42%           >42%     QIAN GARRETT MD    Assessment:  Anshu Root is a 9 1/2 year old male with a history of acute lymphoblastic leukemia that is here for his routine cancer survivorship program visit.  He is now ~2 years 3 months off therapy with no evidence of disease recurrence. PLT down a little but other cell lines look great.  He has been having issues with focusing at school and home and holds a prior diagnosis of ADHD.  The following are our long term follow up recommendations:    New late effects:  none    Plan:     1.  RISK OF RECURRENCE:  Anshu is 2.25 years off therapy and the risk for recurrence continues to decline over time.  CBC today is great with slightly lower platelets.  We will recheck in 3 months and continue that for the next year.  At the 4th year off therapy we will transition to every 4-6 months and then transition to yearly.    2.  PSYCHOSOCIAL EFFECTS:  Anshu has a diagnosis of ADHD and is having some issues with focusing.  I discussed with mom that she should establish care with a PCP and discuss the possibility of medication trial.  They plan to make this appt soon.    3.  RISK FOR PERIPHERAL NEUROPATHY:  Anshu has a history of vincristine and foot drop.  No current issues and should not be an issue in the future.    4.  BONE HEALTH:  Anshu has a history of methotrexate and steroids and risk for osteopenia.  Baseline dexa on 5/26/21 WNL.    5.  RISK FOR LIVER TOXICITY:  Anshu has a history of chemotherapy that can affect liver  function.  He had baseline LFTs today that are WNL.  We will continue to check those as clinically indicated.    6.  GROWTH AND DEVELOPMENT:  Anshu has a history of chemotherapy before the age of 6 so risk for issues with growth.  We will continue to follow his height and weight at each visit.  He is growing well.      7.  MALE ISSUES RELATED TO FERTILITY:  Anshu has a history of 1 GM of cyclophosphamide so is at low risk for infertility.  Should he be interested in knowing the true effect of chemo on his sperm production we could assess that post puberty.    8.  HEART HEALTH:  Anshu has a history of doxorubicin and risk for cardiomyopathy (low risk). He should have an echocardiogram every 5 years.  Last done in 2019 and will plan to repeat in 2024.      9.  RISK FOR SECONDARY NEOPLASMS:  Anshu has a history of chemotherapy and risk for secondary neoplasm.  He should wear sunscreen when outdoors.  We will continue to check CBCs at each visit until 10 years off therapy.    It was a pleasure meeting Anshu and his mom today. We appreciate the opportunity to participate in his care.  If you have any questions or concerns, I can be reached at [phone].  We ask that Anshu return in 3 months for exam and labs.    Reema Reynoso MSN, APRN, CPNP-AC, CPON  Department of Pediatrics  Division of Hematology/Oncology    Review of the result(s) of each unique test - CBC, DEXA  Assessment requiring an independent historian(s) - family - mother  30 minutes spent on the date of the encounter doing chart review, history and exam, documentation and further activities per the note    Parent(s) of Anshu Dk  [id number]   [facility] [id number]               None

## 2023-11-10 NOTE — OB PROVIDER LABOR PROGRESS NOTE - ASSESSMENT
@ 38.4wks A2 IOL  sp cervical balloon  AROM without incident -clear  ISE/IUPC placed without incident- FHR and contraction pattern via external monitoring indeterminate secondary to patients body habitus  Cont with pitocin titration  Cont fetal/maternal monitoring  Will reassess PRN  Anticipate       dw MD Radhames Wilkinson NP
Spoke to patient, informed her that her annual visit is scheduled for 12/6/19. Pt voiced understanding. She did ask about her donor and I told her that her potential donors will need to call the donor coordinator to get information.   
44y/o  at 38+4wsk GDMA2 IOL now with CB in place.     Plan  -continue cytotec  -continue EFM/TOCO/IV fluids  -repositioning    Dr Ricci updated  Rosana Fry CNM
 @38.5wks A2, gHTN  VE unchanged  Cont with pitocin titration, currently @4mu  Cont fetal/maternal monitoring  Will reassess PRSHEKHAR Wilkinson NP
42 yo  at 38/4 weeks A2 IOL  - pt very uncomfortable, requesting epidural  - plan to move to labor floor  - cont to monitor     d/w Dr Radhames tijerina PA-C
42 yo  at 38/4 weeks IOL for A2  - vaginal cytotec #2 given   - comfortable with epidural  - cont to monitor. consider switching to external fetal monitor if discontinuous     breezy GUARDADO

## 2023-11-10 NOTE — OB RN DELIVERY SUMMARY - NS_SEPSISRSKCALC_OBGYN_ALL_OB_FT
EOS calculated successfully. EOS Risk Factor: 0.5/1000 live births ([facility] [place] national incidence); GA=38w5d; Temp=98.78; ROM=17.133; GBS='Positive'; Antibiotics='GBS specific antibiotics > 2 hrs prior to birth'

## 2023-11-10 NOTE — OB PROVIDER LABOR PROGRESS NOTE - NS_SUBJECTIVE/OBJECTIVE_OBGYN_ALL_OB_FT
Pt seen & examined at bedside for labor progress and CB placement. s/p VC#2 placed at 3pm by SAMI RUIZ. Resting comfortably with epidural.     Vital Signs Last 24 Hrs  T(C): 37.1 (09 Nov 2023 13:00), Max: 37.1 (09 Nov 2023 13:00)  T(F): 98.78 (09 Nov 2023 13:00), Max: 98.78 (09 Nov 2023 13:00)  HR: 83 (09 Nov 2023 15:12) (78 - 113)  BP: 147/78 (09 Nov 2023 15:01) (109/60 - 183/85)  BP(mean): --  RR: 16 (09 Nov 2023 13:00) (16 - 18)  SpO2: 99% (09 Nov 2023 15:12) (87% - 100%)    Parameters below as of 09 Nov 2023 09:52  Patient On (Oxygen Delivery Method): room air        FHR  discontinuous. RN adjusting.  with no audible decels noted.   TOCO: irregular  VE: 0.5/50/-3, intact, posterior, firm  CB placed in usual fashion. Pt tolerated procedure well.
pt see and examined at bedside for placement of vaginal cytotec
Patient seen and examined to assess for cervical change. Patient comfortable in bed with epidural in place.  VS  T(C): 37.1 (11-09-23 @ 20:35)  HR: 112 (11-10-23 @ 00:49)  BP: 135/69 (11-10-23 @ 00:30)  RR: 16 (11-09-23 @ 20:35)  SpO2: 98% (11-10-23 @ 00:49)
pt seen and examined for continuation of care
Reported by RN that patients cervical balloon came out. Patient seen and examined at bedside. Effective epidural in place.  VS  T(C): 37.1 (11-09-23 @ 20:35)  HR: 93 (11-09-23 @ 20:59)  BP: 134/93 (11-09-23 @ 20:47)  RR: 16 (11-09-23 @ 20:35)  SpO2: 98% (11-09-23 @ 20:59)

## 2023-11-10 NOTE — CHART NOTE - NSCHARTNOTEFT_GEN_A_CORE
Attending Note     PT feeling pressure     AFVSS  Gen in NAD   ABd soft, gravid  Ext: no c/c/e     SVE 5/70/-3 unchanged  FHTS 150 mod hazel no decel + accels   IUPC a 2-4 min < 200 MU/MIN     A/P P0 at 38 weeks here for IOL   will continue to titrate pitocin   jeni Luz MD
Patient meeting criteria for gHTN. BP >140/90 x2, 4 hrs apart    Patient seen at bedside to discuss dx    At this time, patient denies headache, blurry vision, epigastric pain, n/v  Patient informed that she is at increased risk for developing PEC/sPEC. Discussed the need for antihypertensive meds and/or Mag ppx if progressed to sPEC.  Patient informed that she is at increased risk for developing hypertensive dx and/or PEC with future pregnancies  In addition, patient informed she is at increased risk for cardiovascular disease or hypertension outside of pregnancy  Patient verbalizes understanding. All questions and concerns addressed at this time.      HELLP labs pending  BP monitoring    Amberly Stone PGY1
NP note       @38.5wks A2, gHTN IOL  Patient meeting criteria for sPEC with SF secondary to sustained severe range BPs:   0132: 171/78, HR: 89   0150: 163/84, HR: 90  Patient seen at bedside to discuss dx    VS  T(C): 36.8 (11-10-23 @ 01:45)  HR: 93 (11-10-23 @ 02:02)  BP: 157/77 (11-10-23 @ 02:02)  RR: 16 (23 @ 20:35)  SpO2: 96% (11-10-23 @ 01:59)    At this time, patient denies headache, blurry vision, epigastric pain, n/v  Patient informed that she is at increased risk for developing PEC/sPEC. Discussed the need for antihypertensive meds and Mag ppx.  Patient informed that she is at increased risk for developing hypertensive dx and/or PEC with future pregnancies  In addition, patient informed she is at increased risk for cardiovascular disease or hypertension outside of pregnancy  Patient verbalizes understanding. All questions and concerns addressed at this time.    Procardia 10IR  Procardia 30XL  Magnesium for seizure ppx  BP monitoring    dw MD Radhames Wilkinson NP
pt seen at bedside for placement of vaginal cytotec    cat 1 tracing   periods of discontinuity. pt to be repositioned      44 yo  at 38/4 weeks IOL A2  - vaginal cytotec placed without incident. pt tolerated well  - will re examine prior to next does   - cont to monitor EFM/toco    breezy BARFIELD

## 2023-11-10 NOTE — BRIEF OPERATIVE NOTE - NSICDXBRIEFPREOP_GEN_ALL_CORE_FT
PRE-OP DIAGNOSIS:  Abnormal fetal heart rate or rhythm affecting management of mother 10-Nov-2023 15:24:09  Allyson Gibson  Arrested labor 10-Nov-2023 15:24:23  Allyson Gibson

## 2023-11-10 NOTE — OB PROVIDER LABOR PROGRESS NOTE - NS_OBIHIFHRDETAILS_OBGYN_ALL_OB_FT
130/mod variability/+accels/no decels    periods of discontinuity     cat 1
140/mod variability/+accels/late decel x1     cat 2    periods of discontinuity
FHR discontinuous    Once ISE placed:  145 mod hazel/+accels/-decels
135 mod hazel/+accels/-decels

## 2023-11-10 NOTE — OB RN INTRAOPERATIVE NOTE - NSSELHIDDEN_OBGYN_ALL_OB_FT
[NS_DeliveryAttending1_OBGYN_ALL_OB_FT:[id number]],[NS_DeliveryRN_OBGYN_ALL_OB_FT:[id number]]

## 2023-11-10 NOTE — OB RN INTRAOPERATIVE NOTE - NS_ROOMIN_OBGYN_ALL_OB_DT
[facility],  [address]  Phone: [phone]  Fax [phone]    Physical Therapy Daily Treatment Note  Date:  2020    Patient Name:  Eulogio Cross    :  1953  MRN: [id number]  Restrictions/Precautions:    Medical/Treatment Diagnosis Information:  · Diagnosis: M17.12 Primary OA of the L knee  · Treatment Diagnosis: M25.562 Left knee pain; M17.12 OA of the Left knee  Insurance/Certification information:  PT Insurance Information: [employer]  Physician Information:  Referring Practitioner: Abraham James MD  Plan of care signed (Y/N):     Date of Patient follow up with Physician: Sgretel date 2021    G-Code (if applicable):      Date G-Code Applied:         Progress Note: [x]  Yes  []  No  Next due by: Visit #10       Latex Allergy:  [x]NO      []YES   Preferred Language for Healthcare:   [x]English       []other:        Visit # Insurance Allowable Auth Required   In-person 1 60 visits []  Yes [x]  No    Telehealth     []  Yes []  No    Total        Pain level:  2/10     SUBJECTIVE:  See eval    OBJECTIVE: See eval  ? Observation:   ? Test measurements:      RESTRICTIONS/PRECAUTIONS: L knee OA    Exercises/Interventions:     Therapeutic Ex Sets/reps Notes        Seated HS stretch 3x30 sec HEP   Seated calf stretch 3x30 sec HEP   Seated QS BLEs 10 sec 10x HEP   SLR FLX supine 1x10 HEP   Seated heelslide with strap 10 sec 10x HEP   LAQ  1x10 HEP   minisquats 1x10 HEP                                 Pt education on current condition, POC, expectations for therapy, and HEP 8'         Manual Intervention                                   NMR re-education                                                      Therapeutic Exercise and NMR EXR [x] (51195) Provided verbal/tactile cueing for activities related to strengthening, flexibility, endurance, ROM for improvements in LE, proximal hip, and core control with self care, mobility, lifting, ambulation.  [] (56221) Provided verbal/tactile cueing for activities related to improving balance, coordination, kinesthetic sense, posture, motor skill, proprioception  to assist with LE, proximal hip, and core control in self care, mobility, lifting, ambulation and eccentric single leg control.      NMR and Therapeutic Activities:    [] (56695 or 78439) Provided verbal/tactile cueing for activities related to improving balance, coordination, kinesthetic sense, posture, motor skill, proprioception and motor activation to allow for proper function of core, proximal hip and LE with self care and ADLs  [] (47219) Gait Re-education- Provided training and instruction to the patient for proper LE, core and proximal hip recruitment and positioning and eccentric body weight control with ambulation re-education including up and down stairs     Home Exercise Program:    [x] (29123) Reviewed/Progressed HEP activities related to strengthening, flexibility, endurance, ROM of core, proximal hip and LE for functional self-care, mobility, lifting and ambulation/stair navigation   [] (10654)Reviewed/Progressed HEP activities related to improving balance, coordination, kinesthetic sense, posture, motor skill, proprioception of core, proximal hip and LE for self care, mobility, lifting, and ambulation/stair navigation      Manual Treatments:  PROM / STM / Oscillations-Mobs:  G-I, II, III, IV (PA's, Inf., Post.) [] (05739) Provided manual therapy to mobilize LE, proximal hip and/or LS spine soft tissue/joints for the purpose of modulating pain, promoting relaxation,  increasing ROM, reducing/eliminating soft tissue swelling/inflammation/restriction, improving soft tissue extensibility and allowing for proper ROM for normal function with self care, mobility, lifting and ambulation. Modalities:      Charges:  Timed Code Treatment Minutes: 25'   Total Treatment Minutes: 36'     [x] EVAL (LOW) 455 1011 (typically 20 minutes face-to-face)  [] EVAL (MOD) 05461 (typically 30 minutes face-to-face)  [] EVAL (HIGH) 71806 (typically 45 minutes face-to-face)  [] RE-EVAL     [x] EY(57463) x   2  [] IONTO  [] NMR (61682) x     [] VASO  [] Manual (38179) x      [] Other:  [] TA x      [] Mech Traction (25141)  [] ES(attended) (02816)      [] ES (un) (74948):     GOALS:   Patient stated goal: To be prepared for surgery      Therapist goals for Patient:    Short Term Goals: To be achieved in: 1 weeks  1. Independent in HEP and progression per patient tolerance, in order to prevent re-injury and to prepare for surgery by strength and flexibility therex . Progression Towards Functional goals:  [] Patient is progressing as expected towards functional goals listed. [] Progression is slowed due to complexities listed. [] Progression has been slowed due to co-morbidities.   [x] Plan just implemented, too soon to assess goals progression  [] Other:     ASSESSMENT:  See eval    Treatment/Activity Tolerance:  [x] Patient tolerated treatment well [] Patient limited by fatique  [] Patient limited by pain  [] Patient limited by other medical complications  [] Other:   [] Patient did not tolerate physical therapy activity due to substantial increase in pain    Prognosis: [x] Good [] Fair  [] Poor          Patient Requires Follow-up: [] Yes  [x] No    PLAN: See eval       [] Continue per plan of care  [] Alter current plan (see comments) [x] Plan of care initiated [] Discharge    [x] Discharge to home program at this time due to inability to tolerate physical therapy activity       Electronically signed by: Sary Cardoso, PT, DPT, [facility] [id number] 10-Nov-2023 13:00

## 2023-11-10 NOTE — OB PROVIDER DELIVERY SUMMARY - NSPROVIDERDELIVERYNOTE_OBGYN_ALL_OB_FT
Viable female infant, apgar 9/9, weight 2560 gms  delivered @13:33  cephalic presentation, deflexed head at OP presentation, clear copious fluid noted  body cord x 1,   hysterotomy closed in 2 layers using Caprosyn suture  TXA, Hemabate, Lomotil administered for atonic uterus.   Jennifer and Interceed placed over hysterotomy, rectus layer reapproximated over the bladder, fascia layer closed using 0-Vicryl suture.  uterus with multiple small subserosal myomas, otherwise tubes & ovaries are wnl.    QBL: 511 cc  UOP: 125 cc  IVF: 1500 cc  Dictation Number: [id number]

## 2023-11-10 NOTE — OB RN DELIVERY SUMMARY - NSSELHIDDEN_OBGYN_ALL_OB_FT
[NS_DeliveryAttending1_OBGYN_ALL_OB_FT:[id number]],[NS_DeliveryRN_OBGYN_ALL_OB_FT:[id number]=]

## 2023-11-10 NOTE — OB NEONATOLOGY/PEDIATRICIAN DELIVERY SUMMARY - NSPEDSNEONOTESA_OBGYN_ALL_OB_FT
Peds called to OR for NRFHT and magnesium induction. 38.5wga SGA female born via pC/S to a 43y/o G 1 mother.  Maternal history of TAMMY, endometriosis (s/p lap ). IVF gestation. Maternal labs include blood type AB+, HIV Ag/Ab nonreactive, RPR nonreactive, rubella immune, HBsAg negative, GBS + on 10/17 (received amp x 8, first dose at 0655 ). ROM at  on  with clear fluids (ROM hours: 17H 8M).  Baby emerged vigorous, crying, was w/d/s/s with APGARS of 9/ 9. Body cord x 1. Resuscitation included: tactile stimulation and bulb suction. Mom plans to initiate breastfeeding, consents Hep B vaccine.  Highest maternal temp: 37.0. EOS 0.11.    : 11/10  TOB: 1333  Weight: 2560g    Physical Exam:  General: NAD, awake, alert, healthy appearing for age  Head: AFSOF  Eyes: White sclerae  Ears: Normal position and shape of external ears, no tags or pits  Nose: Patent nares  Throat: MMM, intact palate  Neck: Clavicles intact without palpable step off b/l  Cardiovascular: CR <2 sec, 2+ femoral pulses  Pulmonary: No retractions, no increased WOB  Abdominal: Soft, NTND x 4Q, no masses, umbilical stump clamped, 3 vessel umbilical cord  Spine: Straight, no sacral dimple or tuft  Genitourinary: Patent anus, NL external genitalia, no lesions, SMR 1  Skin: Warm, dry, no abnormal rashes  Extremities: FROM x 4 extremities, no deformities, no edema, negative Pizano and Ortolani, >60 degrees of hip abduction b/l  Neurologic: Strong suck and gag, no gross motor or sensory deficit, grasp intact x 4 exts, upgoing Babinski b/l, Brinda symmetric b/l Peds called to OR for NRFHT and magnesium induction. 38.5wga SGA female born via pC/S to a 43y/o G 1 mother. Severe preE and GDMA2 with present gestation. Maternal history of TAMMY, endometriosis (s/p lap ). IVF gestation. Maternal labs include blood type AB+, HIV Ag/Ab nonreactive, RPR nonreactive, rubella immune, HBsAg negative, GBS + on 10/17 (received amp x 8, first dose at 0655 ). ROM at  on  with clear fluids (ROM hours: 17H 8M).  Baby emerged vigorous, crying, was w/d/s/s with APGARS of 9/ 9. Body cord x 1. Resuscitation included: tactile stimulation and bulb suction. Mom plans to initiate breastfeeding, consents Hep B vaccine.  Highest maternal temp: 37.0. EOS 0.11.    : 11/10  TOB: 1333  Weight: 2560g    Physical Exam:  General: NAD, awake, alert, healthy appearing for age  Head: AFSOF  Eyes: White sclerae  Ears: Normal position and shape of external ears, no tags or pits  Nose: Patent nares  Throat: MMM, intact palate  Neck: Clavicles intact without palpable step off b/l  Cardiovascular: CR <2 sec, 2+ femoral pulses  Pulmonary: No retractions, no increased WOB  Abdominal: Soft, NTND x 4Q, no masses, umbilical stump clamped, 3 vessel umbilical cord  Spine: Straight, no sacral dimple or tuft  Genitourinary: Patent anus, NL external genitalia, no lesions, SMR 1  Skin: Warm, dry, no abnormal rashes  Extremities: FROM x 4 extremities, no deformities, no edema, negative Pizano and Ortolani, >60 degrees of hip abduction b/l  Neurologic: Strong suck and gag, no gross motor or sensory deficit, grasp intact x 4 exts, upgoing Babinski b/l, Stilwell symmetric b/l

## 2023-11-11 LAB
ALBUMIN SERPL ELPH-MCNC: 2.7 G/DL — LOW (ref 3.3–5)
ALBUMIN SERPL ELPH-MCNC: 2.7 G/DL — LOW (ref 3.3–5)
ALP SERPL-CCNC: 190 U/L — HIGH (ref 40–120)
ALP SERPL-CCNC: 190 U/L — HIGH (ref 40–120)
ALT FLD-CCNC: 11 U/L — SIGNIFICANT CHANGE UP (ref 4–33)
ALT FLD-CCNC: 11 U/L — SIGNIFICANT CHANGE UP (ref 4–33)
ANION GAP SERPL CALC-SCNC: 11 MMOL/L — SIGNIFICANT CHANGE UP (ref 7–14)
ANION GAP SERPL CALC-SCNC: 11 MMOL/L — SIGNIFICANT CHANGE UP (ref 7–14)
APTT BLD: 30.7 SEC — SIGNIFICANT CHANGE UP (ref 24.5–35.6)
APTT BLD: 30.7 SEC — SIGNIFICANT CHANGE UP (ref 24.5–35.6)
AST SERPL-CCNC: 25 U/L — SIGNIFICANT CHANGE UP (ref 4–32)
AST SERPL-CCNC: 25 U/L — SIGNIFICANT CHANGE UP (ref 4–32)
BASOPHILS # BLD AUTO: 0.06 K/UL — SIGNIFICANT CHANGE UP (ref 0–0.2)
BASOPHILS # BLD AUTO: 0.06 K/UL — SIGNIFICANT CHANGE UP (ref 0–0.2)
BASOPHILS NFR BLD AUTO: 0.4 % — SIGNIFICANT CHANGE UP (ref 0–2)
BASOPHILS NFR BLD AUTO: 0.4 % — SIGNIFICANT CHANGE UP (ref 0–2)
BILIRUB SERPL-MCNC: 0.2 MG/DL — SIGNIFICANT CHANGE UP (ref 0.2–1.2)
BILIRUB SERPL-MCNC: 0.2 MG/DL — SIGNIFICANT CHANGE UP (ref 0.2–1.2)
BUN SERPL-MCNC: 9 MG/DL — SIGNIFICANT CHANGE UP (ref 7–23)
BUN SERPL-MCNC: 9 MG/DL — SIGNIFICANT CHANGE UP (ref 7–23)
CALCIUM SERPL-MCNC: 7.4 MG/DL — LOW (ref 8.4–10.5)
CALCIUM SERPL-MCNC: 7.4 MG/DL — LOW (ref 8.4–10.5)
CHLORIDE SERPL-SCNC: 102 MMOL/L — SIGNIFICANT CHANGE UP (ref 98–107)
CHLORIDE SERPL-SCNC: 102 MMOL/L — SIGNIFICANT CHANGE UP (ref 98–107)
CO2 SERPL-SCNC: 19 MMOL/L — LOW (ref 22–31)
CO2 SERPL-SCNC: 19 MMOL/L — LOW (ref 22–31)
CREAT SERPL-MCNC: 0.95 MG/DL — SIGNIFICANT CHANGE UP (ref 0.5–1.3)
CREAT SERPL-MCNC: 0.95 MG/DL — SIGNIFICANT CHANGE UP (ref 0.5–1.3)
EGFR: 77 ML/MIN/1.73M2 — SIGNIFICANT CHANGE UP
EGFR: 77 ML/MIN/1.73M2 — SIGNIFICANT CHANGE UP
EOSINOPHIL # BLD AUTO: 0.03 K/UL — SIGNIFICANT CHANGE UP (ref 0–0.5)
EOSINOPHIL # BLD AUTO: 0.03 K/UL — SIGNIFICANT CHANGE UP (ref 0–0.5)
EOSINOPHIL NFR BLD AUTO: 0.2 % — SIGNIFICANT CHANGE UP (ref 0–6)
EOSINOPHIL NFR BLD AUTO: 0.2 % — SIGNIFICANT CHANGE UP (ref 0–6)
FIBRINOGEN PPP-MCNC: 603 MG/DL — HIGH (ref 200–465)
FIBRINOGEN PPP-MCNC: 603 MG/DL — HIGH (ref 200–465)
GLUCOSE BLDC GLUCOMTR-MCNC: 106 MG/DL — HIGH (ref 70–99)
GLUCOSE BLDC GLUCOMTR-MCNC: 106 MG/DL — HIGH (ref 70–99)
GLUCOSE SERPL-MCNC: 96 MG/DL — SIGNIFICANT CHANGE UP (ref 70–99)
GLUCOSE SERPL-MCNC: 96 MG/DL — SIGNIFICANT CHANGE UP (ref 70–99)
HCT VFR BLD CALC: 31.4 % — LOW (ref 34.5–45)
HCT VFR BLD CALC: 31.4 % — LOW (ref 34.5–45)
HGB BLD-MCNC: 10 G/DL — LOW (ref 11.5–15.5)
HGB BLD-MCNC: 10 G/DL — LOW (ref 11.5–15.5)
IANC: 14.61 K/UL — HIGH (ref 1.8–7.4)
IANC: 14.61 K/UL — HIGH (ref 1.8–7.4)
IMM GRANULOCYTES NFR BLD AUTO: 0.3 % — SIGNIFICANT CHANGE UP (ref 0–0.9)
IMM GRANULOCYTES NFR BLD AUTO: 0.3 % — SIGNIFICANT CHANGE UP (ref 0–0.9)
INR BLD: 0.92 RATIO — SIGNIFICANT CHANGE UP (ref 0.85–1.18)
INR BLD: 0.92 RATIO — SIGNIFICANT CHANGE UP (ref 0.85–1.18)
LDH SERPL L TO P-CCNC: 355 U/L — HIGH (ref 135–225)
LDH SERPL L TO P-CCNC: 355 U/L — HIGH (ref 135–225)
LYMPHOCYTES # BLD AUTO: 1.75 K/UL — SIGNIFICANT CHANGE UP (ref 1–3.3)
LYMPHOCYTES # BLD AUTO: 1.75 K/UL — SIGNIFICANT CHANGE UP (ref 1–3.3)
LYMPHOCYTES # BLD AUTO: 10.3 % — LOW (ref 13–44)
LYMPHOCYTES # BLD AUTO: 10.3 % — LOW (ref 13–44)
MAGNESIUM SERPL-MCNC: 6.6 MG/DL — HIGH (ref 1.6–2.6)
MAGNESIUM SERPL-MCNC: 6.6 MG/DL — HIGH (ref 1.6–2.6)
MCHC RBC-ENTMCNC: 25.4 PG — LOW (ref 27–34)
MCHC RBC-ENTMCNC: 25.4 PG — LOW (ref 27–34)
MCHC RBC-ENTMCNC: 31.8 GM/DL — LOW (ref 32–36)
MCHC RBC-ENTMCNC: 31.8 GM/DL — LOW (ref 32–36)
MCV RBC AUTO: 79.9 FL — LOW (ref 80–100)
MCV RBC AUTO: 79.9 FL — LOW (ref 80–100)
MONOCYTES # BLD AUTO: 0.55 K/UL — SIGNIFICANT CHANGE UP (ref 0–0.9)
MONOCYTES # BLD AUTO: 0.55 K/UL — SIGNIFICANT CHANGE UP (ref 0–0.9)
MONOCYTES NFR BLD AUTO: 3.2 % — SIGNIFICANT CHANGE UP (ref 2–14)
MONOCYTES NFR BLD AUTO: 3.2 % — SIGNIFICANT CHANGE UP (ref 2–14)
NEUTROPHILS # BLD AUTO: 14.61 K/UL — HIGH (ref 1.8–7.4)
NEUTROPHILS # BLD AUTO: 14.61 K/UL — HIGH (ref 1.8–7.4)
NEUTROPHILS NFR BLD AUTO: 85.6 % — HIGH (ref 43–77)
NEUTROPHILS NFR BLD AUTO: 85.6 % — HIGH (ref 43–77)
NRBC # BLD: 0 /100 WBCS — SIGNIFICANT CHANGE UP (ref 0–0)
NRBC # BLD: 0 /100 WBCS — SIGNIFICANT CHANGE UP (ref 0–0)
NRBC # FLD: 0 K/UL — SIGNIFICANT CHANGE UP (ref 0–0)
NRBC # FLD: 0 K/UL — SIGNIFICANT CHANGE UP (ref 0–0)
PLATELET # BLD AUTO: 370 K/UL — SIGNIFICANT CHANGE UP (ref 150–400)
PLATELET # BLD AUTO: 370 K/UL — SIGNIFICANT CHANGE UP (ref 150–400)
POTASSIUM SERPL-MCNC: 4.3 MMOL/L — SIGNIFICANT CHANGE UP (ref 3.5–5.3)
POTASSIUM SERPL-MCNC: 4.3 MMOL/L — SIGNIFICANT CHANGE UP (ref 3.5–5.3)
POTASSIUM SERPL-SCNC: 4.3 MMOL/L — SIGNIFICANT CHANGE UP (ref 3.5–5.3)
POTASSIUM SERPL-SCNC: 4.3 MMOL/L — SIGNIFICANT CHANGE UP (ref 3.5–5.3)
PROT SERPL-MCNC: 6.3 G/DL — SIGNIFICANT CHANGE UP (ref 6–8.3)
PROT SERPL-MCNC: 6.3 G/DL — SIGNIFICANT CHANGE UP (ref 6–8.3)
PROTHROM AB SERPL-ACNC: 10.4 SEC — SIGNIFICANT CHANGE UP (ref 9.5–13)
PROTHROM AB SERPL-ACNC: 10.4 SEC — SIGNIFICANT CHANGE UP (ref 9.5–13)
RBC # BLD: 3.93 M/UL — SIGNIFICANT CHANGE UP (ref 3.8–5.2)
RBC # BLD: 3.93 M/UL — SIGNIFICANT CHANGE UP (ref 3.8–5.2)
RBC # FLD: 15.6 % — HIGH (ref 10.3–14.5)
RBC # FLD: 15.6 % — HIGH (ref 10.3–14.5)
SODIUM SERPL-SCNC: 132 MMOL/L — LOW (ref 135–145)
SODIUM SERPL-SCNC: 132 MMOL/L — LOW (ref 135–145)
URATE SERPL-MCNC: 6.5 MG/DL — SIGNIFICANT CHANGE UP (ref 2.5–7)
URATE SERPL-MCNC: 6.5 MG/DL — SIGNIFICANT CHANGE UP (ref 2.5–7)
WBC # BLD: 17.05 K/UL — HIGH (ref 3.8–10.5)
WBC # BLD: 17.05 K/UL — HIGH (ref 3.8–10.5)
WBC # FLD AUTO: 17.05 K/UL — HIGH (ref 3.8–10.5)
WBC # FLD AUTO: 17.05 K/UL — HIGH (ref 3.8–10.5)

## 2023-11-11 RX ORDER — NIFEDIPINE 30 MG
30 TABLET, EXTENDED RELEASE 24 HR ORAL DAILY
Refills: 0 | Status: DISCONTINUED | OUTPATIENT
Start: 2023-11-11 | End: 2023-11-13

## 2023-11-11 RX ORDER — IBUPROFEN 200 MG
600 TABLET ORAL EVERY 6 HOURS
Refills: 0 | Status: DISCONTINUED | OUTPATIENT
Start: 2023-11-11 | End: 2023-11-13

## 2023-11-11 RX ORDER — SODIUM CHLORIDE 9 MG/ML
1000 INJECTION, SOLUTION INTRAVENOUS
Refills: 0 | Status: DISCONTINUED | OUTPATIENT
Start: 2023-11-11 | End: 2023-11-13

## 2023-11-11 RX ADMIN — Medication 100 MILLIGRAM(S): at 05:28

## 2023-11-11 RX ADMIN — Medication 30 MILLIGRAM(S): at 07:25

## 2023-11-11 RX ADMIN — Medication 975 MILLIGRAM(S): at 08:42

## 2023-11-11 RX ADMIN — Medication 600 MILLIGRAM(S): at 23:45

## 2023-11-11 RX ADMIN — Medication 975 MILLIGRAM(S): at 21:40

## 2023-11-11 RX ADMIN — Medication 975 MILLIGRAM(S): at 09:12

## 2023-11-11 RX ADMIN — Medication 975 MILLIGRAM(S): at 14:12

## 2023-11-11 RX ADMIN — HEPARIN SODIUM 10000 UNIT(S): 5000 INJECTION INTRAVENOUS; SUBCUTANEOUS at 20:44

## 2023-11-11 RX ADMIN — HEPARIN SODIUM 10000 UNIT(S): 5000 INJECTION INTRAVENOUS; SUBCUTANEOUS at 08:39

## 2023-11-11 RX ADMIN — Medication 50 GM/HR: at 07:25

## 2023-11-11 RX ADMIN — Medication 600 MILLIGRAM(S): at 18:48

## 2023-11-11 RX ADMIN — Medication 975 MILLIGRAM(S): at 20:44

## 2023-11-11 RX ADMIN — Medication 600 MILLIGRAM(S): at 18:18

## 2023-11-11 RX ADMIN — Medication 975 MILLIGRAM(S): at 14:42

## 2023-11-11 RX ADMIN — Medication 30 MILLIGRAM(S): at 06:23

## 2023-11-11 RX ADMIN — Medication 30 MILLIGRAM(S): at 07:55

## 2023-11-11 NOTE — PROVIDER CONTACT NOTE (OTHER) - ASSESSMENT
vitals as per flowsheet. stable. Denies headache, visual disturbance, pain and or dizziness. Anne output adequate, lochia WNL.

## 2023-11-11 NOTE — PROGRESS NOTE ADULT - ASSESSMENT
A/P: 44yo POD#1 s/p LTCS c/b sPEC.  Patient is stable and doing well post-operatively.      #Postop from LTCS  - Continue regular diet.  - Increase ambulation.  - Continue motrin, tylenol, oxycodone PRN for pain control.   - F/u AM CBC    #sPEC  -cw BP monitoring, BP: 123/64 (11-11-23 @ 03:00) (117/68 - 123/64)  -HELLP wnl, P/C: 0.3  -Pt on Pro30  -No PEC symptoms    Olinda Youssef MD PGY1   A/P: 42yo POD#1 s/p LTCS c/b sPEC.  Patient is stable and doing well post-operatively.      #Postop from LTCS  - Continue regular diet.  - Increase ambulation.  - Continue motrin, tylenol, oxycodone PRN for pain control.   - F/u AM CBC    #sPEC  -cw BP monitoring, BP: 123/64 (11-11-23 @ 03:00) (117/68 - 123/64)  -HELLP wnl, P/C: 0.3  -Pt on Pro30  -No PEC symptoms    Olinda Youssef MD PGY1      Attending note   agree with assessment and plan   continue postop care C Radhames

## 2023-11-11 NOTE — LACTATION INITIAL EVALUATION - POTENTIAL FOR
I spoke to the patient. He is going to get a chest x-ray ordered by another physician and will call me for results. He is already on cefdinir.
Spoke with patient. Patient reports shortness of breath with mild exertion and cough with yellow/green phlegm x 1-2 weeks. Last O2sat check was this AM, 99% on room air. Patient using Trelegy and Flonase nasal spray. Denies fever/chest pain. Patient states Dr. Noe Anguiano (cardiologist) recommended patient to be seen by Dr. Gemma Tuttle. Patient unaware of any COVID exposure.
Wife called in stating pt is having SOB and coughing. Attempting to call RN now.  Please call
ineffective breastfeeding/sore breast/s/sore nipples/engorgement/knowledge deficit/plugged ducts/latch on difficulty/low supply/delayed secretory activation

## 2023-11-11 NOTE — PROVIDER CONTACT NOTE (OTHER) - ACTION/TREATMENT ORDERED:
MD made aware. No new orders at this time. continue to monitor. Encourage hydration. Continue on magnesium

## 2023-11-12 LAB
ANION GAP SERPL CALC-SCNC: 10 MMOL/L — SIGNIFICANT CHANGE UP (ref 7–14)
ANION GAP SERPL CALC-SCNC: 10 MMOL/L — SIGNIFICANT CHANGE UP (ref 7–14)
BUN SERPL-MCNC: 16 MG/DL — SIGNIFICANT CHANGE UP (ref 7–23)
BUN SERPL-MCNC: 16 MG/DL — SIGNIFICANT CHANGE UP (ref 7–23)
CALCIUM SERPL-MCNC: 8.4 MG/DL — SIGNIFICANT CHANGE UP (ref 8.4–10.5)
CALCIUM SERPL-MCNC: 8.4 MG/DL — SIGNIFICANT CHANGE UP (ref 8.4–10.5)
CHLORIDE SERPL-SCNC: 108 MMOL/L — HIGH (ref 98–107)
CHLORIDE SERPL-SCNC: 108 MMOL/L — HIGH (ref 98–107)
CO2 SERPL-SCNC: 22 MMOL/L — SIGNIFICANT CHANGE UP (ref 22–31)
CO2 SERPL-SCNC: 22 MMOL/L — SIGNIFICANT CHANGE UP (ref 22–31)
CREAT SERPL-MCNC: 1 MG/DL — SIGNIFICANT CHANGE UP (ref 0.5–1.3)
CREAT SERPL-MCNC: 1 MG/DL — SIGNIFICANT CHANGE UP (ref 0.5–1.3)
EGFR: 72 ML/MIN/1.73M2 — SIGNIFICANT CHANGE UP
EGFR: 72 ML/MIN/1.73M2 — SIGNIFICANT CHANGE UP
GLUCOSE SERPL-MCNC: 87 MG/DL — SIGNIFICANT CHANGE UP (ref 70–99)
GLUCOSE SERPL-MCNC: 87 MG/DL — SIGNIFICANT CHANGE UP (ref 70–99)
MAGNESIUM SERPL-MCNC: 3.3 MG/DL — HIGH (ref 1.6–2.6)
MAGNESIUM SERPL-MCNC: 3.3 MG/DL — HIGH (ref 1.6–2.6)
PHOSPHATE SERPL-MCNC: 4.8 MG/DL — HIGH (ref 2.5–4.5)
PHOSPHATE SERPL-MCNC: 4.8 MG/DL — HIGH (ref 2.5–4.5)
POTASSIUM SERPL-MCNC: 4.5 MMOL/L — SIGNIFICANT CHANGE UP (ref 3.5–5.3)
POTASSIUM SERPL-MCNC: 4.5 MMOL/L — SIGNIFICANT CHANGE UP (ref 3.5–5.3)
POTASSIUM SERPL-SCNC: 4.5 MMOL/L — SIGNIFICANT CHANGE UP (ref 3.5–5.3)
POTASSIUM SERPL-SCNC: 4.5 MMOL/L — SIGNIFICANT CHANGE UP (ref 3.5–5.3)
SODIUM SERPL-SCNC: 140 MMOL/L — SIGNIFICANT CHANGE UP (ref 135–145)
SODIUM SERPL-SCNC: 140 MMOL/L — SIGNIFICANT CHANGE UP (ref 135–145)

## 2023-11-12 RX ADMIN — Medication 600 MILLIGRAM(S): at 13:30

## 2023-11-12 RX ADMIN — Medication 975 MILLIGRAM(S): at 02:39

## 2023-11-12 RX ADMIN — Medication 600 MILLIGRAM(S): at 06:30

## 2023-11-12 RX ADMIN — Medication 975 MILLIGRAM(S): at 09:21

## 2023-11-12 RX ADMIN — Medication 975 MILLIGRAM(S): at 03:37

## 2023-11-12 RX ADMIN — Medication 600 MILLIGRAM(S): at 05:37

## 2023-11-12 RX ADMIN — Medication 975 MILLIGRAM(S): at 08:27

## 2023-11-12 RX ADMIN — Medication 975 MILLIGRAM(S): at 20:38

## 2023-11-12 RX ADMIN — Medication 600 MILLIGRAM(S): at 18:24

## 2023-11-12 RX ADMIN — HEPARIN SODIUM 10000 UNIT(S): 5000 INJECTION INTRAVENOUS; SUBCUTANEOUS at 08:27

## 2023-11-12 RX ADMIN — HEPARIN SODIUM 10000 UNIT(S): 5000 INJECTION INTRAVENOUS; SUBCUTANEOUS at 20:38

## 2023-11-12 RX ADMIN — Medication 600 MILLIGRAM(S): at 12:59

## 2023-11-12 RX ADMIN — Medication 600 MILLIGRAM(S): at 00:36

## 2023-11-12 RX ADMIN — Medication 975 MILLIGRAM(S): at 21:30

## 2023-11-12 RX ADMIN — Medication 975 MILLIGRAM(S): at 16:00

## 2023-11-12 RX ADMIN — Medication 975 MILLIGRAM(S): at 15:03

## 2023-11-12 RX ADMIN — Medication 30 MILLIGRAM(S): at 05:37

## 2023-11-12 RX ADMIN — Medication 600 MILLIGRAM(S): at 18:55

## 2023-11-12 NOTE — PROGRESS NOTE ADULT - ATTENDING COMMENTS
OBGYN Attending    Pt seen and examined at bedside.  Agree with above.  Doing well POD#2.  Patient ambulating, tolerating regular diet w/o nausea/vomiting, voiding w/o difficulty, passing flatus.  Pain is controlled.    Abdomen benign and minimally tender   Incision c/d/i   Ext NT b/l     A/P: 44yo POD#2 s/p pLTCS 2/2 Cat II tracing c/b sPEC s/p Mg.  Patient is stable and doing well post-operatively.    -BP reviewed and within goal range on Proc 30 XL  -repeat BMP this AM to trend Cr (slight increase in Cr from 0.80-> 0.95 yesterday)   -routine pp care.   -Discharge planning for today     MOOSE Loyd MD

## 2023-11-12 NOTE — PROGRESS NOTE ADULT - ASSESSMENT
A/P: 42yo POD#2 s/p pLTCS 2/2 Cat II tracing c/b sPEC s/p Mg.  Patient is stable and doing well post-operatively.      #sPEC   - s/p Mg  - BPs overnight WNL  - Continue Pro 30XL qd  - Denies severe features  - HELLP wnl, 24UP: 609    #Postpartum   - EBL: 511  - Hct: 34.9->31.4  - Continue regular diet.  - Increase ambulation.  - Continue motrin, tylenol, oxycodone PRN for pain control.     Sasha Cuevas, PGY-1

## 2023-11-13 ENCOUNTER — TRANSCRIPTION ENCOUNTER (OUTPATIENT)
Age: 43
End: 2023-11-13

## 2023-11-13 VITALS
RESPIRATION RATE: 18 BRPM | TEMPERATURE: 98 F | DIASTOLIC BLOOD PRESSURE: 70 MMHG | OXYGEN SATURATION: 100 % | SYSTOLIC BLOOD PRESSURE: 134 MMHG | HEART RATE: 82 BPM

## 2023-11-13 LAB
ANION GAP SERPL CALC-SCNC: 11 MMOL/L — SIGNIFICANT CHANGE UP (ref 7–14)
ANION GAP SERPL CALC-SCNC: 11 MMOL/L — SIGNIFICANT CHANGE UP (ref 7–14)
BUN SERPL-MCNC: 16 MG/DL — SIGNIFICANT CHANGE UP (ref 7–23)
BUN SERPL-MCNC: 16 MG/DL — SIGNIFICANT CHANGE UP (ref 7–23)
CALCIUM SERPL-MCNC: 8.9 MG/DL — SIGNIFICANT CHANGE UP (ref 8.4–10.5)
CALCIUM SERPL-MCNC: 8.9 MG/DL — SIGNIFICANT CHANGE UP (ref 8.4–10.5)
CHLORIDE SERPL-SCNC: 105 MMOL/L — SIGNIFICANT CHANGE UP (ref 98–107)
CHLORIDE SERPL-SCNC: 105 MMOL/L — SIGNIFICANT CHANGE UP (ref 98–107)
CO2 SERPL-SCNC: 23 MMOL/L — SIGNIFICANT CHANGE UP (ref 22–31)
CO2 SERPL-SCNC: 23 MMOL/L — SIGNIFICANT CHANGE UP (ref 22–31)
CREAT SERPL-MCNC: 0.9 MG/DL — SIGNIFICANT CHANGE UP (ref 0.5–1.3)
CREAT SERPL-MCNC: 0.9 MG/DL — SIGNIFICANT CHANGE UP (ref 0.5–1.3)
EGFR: 82 ML/MIN/1.73M2 — SIGNIFICANT CHANGE UP
EGFR: 82 ML/MIN/1.73M2 — SIGNIFICANT CHANGE UP
GLUCOSE SERPL-MCNC: 72 MG/DL — SIGNIFICANT CHANGE UP (ref 70–99)
GLUCOSE SERPL-MCNC: 72 MG/DL — SIGNIFICANT CHANGE UP (ref 70–99)
POTASSIUM SERPL-MCNC: 4.4 MMOL/L — SIGNIFICANT CHANGE UP (ref 3.5–5.3)
POTASSIUM SERPL-MCNC: 4.4 MMOL/L — SIGNIFICANT CHANGE UP (ref 3.5–5.3)
POTASSIUM SERPL-SCNC: 4.4 MMOL/L — SIGNIFICANT CHANGE UP (ref 3.5–5.3)
POTASSIUM SERPL-SCNC: 4.4 MMOL/L — SIGNIFICANT CHANGE UP (ref 3.5–5.3)
SODIUM SERPL-SCNC: 139 MMOL/L — SIGNIFICANT CHANGE UP (ref 135–145)
SODIUM SERPL-SCNC: 139 MMOL/L — SIGNIFICANT CHANGE UP (ref 135–145)

## 2023-11-13 RX ORDER — ACETAMINOPHEN 500 MG
3 TABLET ORAL
Qty: 0 | Refills: 0 | DISCHARGE
Start: 2023-11-13

## 2023-11-13 RX ORDER — ASPIRIN/CALCIUM CARB/MAGNESIUM 324 MG
1 TABLET ORAL
Refills: 0 | DISCHARGE

## 2023-11-13 RX ORDER — NIFEDIPINE 30 MG
1 TABLET, EXTENDED RELEASE 24 HR ORAL
Qty: 30 | Refills: 0
Start: 2023-11-13 | End: 2023-12-12

## 2023-11-13 RX ORDER — IBUPROFEN 200 MG
1 TABLET ORAL
Qty: 0 | Refills: 0 | DISCHARGE
Start: 2023-11-13

## 2023-11-13 RX ORDER — HUMAN INSULIN 100 [IU]/ML
30 INJECTION, SUSPENSION SUBCUTANEOUS
Refills: 0 | DISCHARGE

## 2023-11-13 RX ADMIN — Medication 975 MILLIGRAM(S): at 07:55

## 2023-11-13 RX ADMIN — Medication 600 MILLIGRAM(S): at 05:20

## 2023-11-13 RX ADMIN — Medication 600 MILLIGRAM(S): at 00:24

## 2023-11-13 RX ADMIN — Medication 600 MILLIGRAM(S): at 06:00

## 2023-11-13 RX ADMIN — Medication 975 MILLIGRAM(S): at 08:30

## 2023-11-13 RX ADMIN — HEPARIN SODIUM 10000 UNIT(S): 5000 INJECTION INTRAVENOUS; SUBCUTANEOUS at 07:57

## 2023-11-13 RX ADMIN — Medication 30 MILLIGRAM(S): at 05:19

## 2023-11-13 RX ADMIN — Medication 600 MILLIGRAM(S): at 01:02

## 2023-11-13 NOTE — DISCHARGE NOTE OB - PLAN OF CARE
Regular diet.  Resume normal activity as tolerated. Follow up in the office in 2 weeks for an incision check and in 6 weeks for a postpartum visit.  No heavy lifting, driving, or strenuous activity for 2 weeks.  Nothing per vagina such as tampons, intercourse, douches or tub baths for 6 weeks or until you see your doctor.  Call your doctor with any signs and symptoms of infection such as fever, chills, nausea or vomiting.  Call your doctor with redness or swelling at the incision site, fluid leakage or wound separation.  Call your doctor if you're unable to tolerate food, increase in vaginal bleeding or have difficulty urinating.  Call your doctor if you have pain that is not relieved by your prescribed medications.  Notify your doctor with any other concerns. Follow-up in your OB office within 1 week for a blood pressure check. Please take your medication Procardia 30 mg XL daily as directed.  Please take your blood pressure 3x/day. Call your doctor if your blood pressure is 140 (top number) OR 90 (bottom number) or higher. Return to hospital if your blood pressure is 160 (top number)  (bottom number) or higher. Call your doctor if you experience symptoms such as headache, blurry vision, epigastric pain, or nausea/vomiting.

## 2023-11-13 NOTE — DISCHARGE NOTE OB - NS MD DC FALL RISK RISK
For information on Fall & Injury Prevention, visit: [email or web address] OR  [email or web address] OR  https://www.cdc.gov/steadi/patient.html

## 2023-11-13 NOTE — DISCHARGE NOTE OB - NS AS DC AMI YN
Pt comes in w/ co-worker  Injury to L eye at work   Was pulling on zip tie and it came back on him hitting him in he's L eye  Pain, redness and blurred vision    
no

## 2023-11-13 NOTE — PROGRESS NOTE ADULT - SUBJECTIVE AND OBJECTIVE BOX
Attending Note   FHRT reactive + Accels reassuring   Pelvic exam noted 4-5 /70 /vtx -3   on Pitocin at 24 mu   continue induction   continue Mg prophylaxis for SPEC   C Radhames 
Anesthesia Post-op Note    POD#1 S/P C/S + Duramorph.     Patientreports feeling well.  OOBAA. Tolerating clears.  Pain is tolerable.  Reports no symptomatology of PDPH. No residual anesthetic issues or complications noted. All questions answered.       Please reconsult with any questions.      Frank Mackey PGY4 
Attending note   FHRT- category 1   noted with lots of pain   sonogram + hyperflexed and no change on cervical dilation or descend  discussed with patient implications and recommended  section - she agrees   anesthesia consult to give some more pain control if unable to go to OR as floor and OR is very busy. OSCAR Ricci 
OB Progress Note: LTCS, POD#2    S: 44yo POD#2 s/p LTCS. Pain is well controlled. She is tolerating a regular diet and passing flatus. She is voiding spontaneously, and ambulating without difficulty. Denies CP/SOB. Denies lightheadedness/dizziness. Denies N/V.    O:  Vitals:  Vital Signs Last 24 Hrs  T(C): 36.4 (12 Nov 2023 05:30), Max: 37 (11 Nov 2023 12:48)  T(F): 97.5 (12 Nov 2023 05:30), Max: 98.6 (11 Nov 2023 12:48)  HR: 87 (12 Nov 2023 05:30) (81 - 100)  BP: 129/63 (12 Nov 2023 05:30) (116/68 - 137/74)  BP(mean): --  RR: 19 (12 Nov 2023 05:30) (18 - 19)  SpO2: 100% (12 Nov 2023 05:30) (98% - 100%)    Parameters below as of 12 Nov 2023 02:00  Patient On (Oxygen Delivery Method): room air        MEDICATIONS  (STANDING):  acetaminophen     Tablet .. 975 milliGRAM(s) Oral <User Schedule>  diphtheria/tetanus/pertussis (acellular) Vaccine (Adacel) 0.5 milliLiter(s) IntraMuscular once  heparin   Injectable 39286 Unit(s) SubCutaneous every 12 hours  ibuprofen  Tablet. 600 milliGRAM(s) Oral every 6 hours  lactated ringers Bolus 500 milliLiter(s) IV Bolus once  lactated ringers. 1000 milliLiter(s) (50 mL/Hr) IV Continuous <Continuous>  lactated ringers. 1000 milliLiter(s) (125 mL/Hr) IV Continuous <Continuous>  NIFEdipine XL 30 milliGRAM(s) Oral daily  oxytocin Infusion 333.333 milliUNIT(s)/Min (1000 mL/Hr) IV Continuous <Continuous>      MEDICATIONS  (PRN):  dexAMETHasone  Injectable 4 milliGRAM(s) IV Push every 6 hours PRN Nausea  diphenhydrAMINE 25 milliGRAM(s) Oral every 6 hours PRN Pruritus  lanolin Ointment 1 Application(s) Topical every 6 hours PRN Sore Nipples  magnesium hydroxide Suspension 30 milliLiter(s) Oral two times a day PRN Constipation  nalbuphine Injectable 2.5 milliGRAM(s) IV Push every 6 hours PRN Pruritus  naloxone Injectable 0.1 milliGRAM(s) IV Push every 3 minutes PRN For ANY of the following changes in patient status:  A. Breaths Per Minute LESS THAN 10, B. Oxygen saturation LESS THAN 90%, C. Sedation score of 6 for Stop After: 4 Times  ondansetron Injectable 4 milliGRAM(s) IV Push every 6 hours PRN Nausea  oxyCODONE    IR 5 milliGRAM(s) Oral once PRN Moderate to Severe Pain (4-10)  oxyCODONE    IR 5 milliGRAM(s) Oral every 3 hours PRN Moderate to Severe Pain (4-10)  simethicone 80 milliGRAM(s) Chew every 4 hours PRN Gas      Labs:  Blood type: AB Positive  Rubella IgG: RPR: Negative                          10.0<L>   17.05<H> >-----------< 370    ( 11-11 @ 06:55 )             31.4<L>                        11.1<L>   16.69<H> >-----------< 356    ( 11-10 @ 16:25 )             34.9                        11.1<L>   10.34 >-----------< 364    ( 11-09 @ 22:30 )             35.9    11-11-23 @ 06:55      132<L>  |  102  |  9   ----------------------------<  96  4.3   |  19<L>  |  0.95    11-10-23 @ 16:25      134<L>  |  105  |  8   ----------------------------<  135<H>  4.2   |  16<L>  |  0.80    11-09-23 @ 22:30      136  |  106  |  11  ----------------------------<  88  3.9   |  19<L>  |  0.89        Ca    7.4<L>      11 Nov 2023 06:55  Ca    8.1<L>      10 Nov 2023 16:25  Ca    9.5      09 Nov 2023 22:30  Mg     6.60<H>     11-11  Mg     7.40<HH>     11-10  Mg     6.40<H>     11-10  Mg     4.80<H>     11-10    TPro  6.3  /  Alb  2.7<L>  /  TBili  0.2  /  DBili  x   /  AST  25  /  ALT  11  /  AlkPhos  190<H>  11-11-23 @ 06:55  TPro  6.1  /  Alb  2.6<L>  /  TBili  0.3  /  DBili  x   /  AST  21  /  ALT  10  /  AlkPhos  210<H>  11-10-23 @ 16:25  TPro  6.6  /  Alb  3.2<L>  /  TBili  0.4  /  DBili  x   /  AST  15  /  ALT  9   /  AlkPhos  217<H>  11-09-23 @ 22:30          PE:  General: NAD  Abdomen: Soft, appropriately tender, incision c/d/i.  Extremities: No erythema, no pitting edema    
OB Postpartum Note:  Delivery, POD#3    S: 42yo POD#3 s/p LTCS. The patient feels well.  Pain is well controlled. She is tolerating a regular diet and passing flatus. She is voiding spontaneously, and ambulating without difficulty. Denies CP/SOB. Denies lightheadedness/dizziness. Denies N/V. Denies headaches, vision changes, RUQ pain, LE edema. Patient feels ready for discharge.    O:  Vitals:  Vital Signs Last 24 Hrs  T(C): 36.9 (2023 05:19), Max: 37.3 (2023 14:46)  T(F): 98.5 (2023 05:19), Max: 99.2 (2023 14:46)  HR: 94 (2023 05:19) (84 - 98)  BP: 139/79 (2023 05:19) (122/74 - 140/67)  BP(mean): --  RR: 16 (2023 05:19) (16 - 20)  SpO2: 98% (2023 05:19) (98% - 100%)    Parameters below as of 2023 05:19  Patient On (Oxygen Delivery Method): room air        MEDICATIONS  (STANDING):  acetaminophen     Tablet .. 975 milliGRAM(s) Oral <User Schedule>  diphtheria/tetanus/pertussis (acellular) Vaccine (Adacel) 0.5 milliLiter(s) IntraMuscular once  heparin   Injectable 34001 Unit(s) SubCutaneous every 12 hours  ibuprofen  Tablet. 600 milliGRAM(s) Oral every 6 hours  lactated ringers Bolus 500 milliLiter(s) IV Bolus once  lactated ringers. 1000 milliLiter(s) (50 mL/Hr) IV Continuous <Continuous>  lactated ringers. 1000 milliLiter(s) (125 mL/Hr) IV Continuous <Continuous>  NIFEdipine XL 30 milliGRAM(s) Oral daily  oxytocin Infusion 333.333 milliUNIT(s)/Min (1000 mL/Hr) IV Continuous <Continuous>    MEDICATIONS  (PRN):  dexAMETHasone  Injectable 4 milliGRAM(s) IV Push every 6 hours PRN Nausea  diphenhydrAMINE 25 milliGRAM(s) Oral every 6 hours PRN Pruritus  lanolin Ointment 1 Application(s) Topical every 6 hours PRN Sore Nipples  magnesium hydroxide Suspension 30 milliLiter(s) Oral two times a day PRN Constipation  nalbuphine Injectable 2.5 milliGRAM(s) IV Push every 6 hours PRN Pruritus  naloxone Injectable 0.1 milliGRAM(s) IV Push every 3 minutes PRN For ANY of the following changes in patient status:  A. Breaths Per Minute LESS THAN 10, B. Oxygen saturation LESS THAN 90%, C. Sedation score of 6 for Stop After: 4 Times  ondansetron Injectable 4 milliGRAM(s) IV Push every 6 hours PRN Nausea  oxyCODONE    IR 5 milliGRAM(s) Oral every 3 hours PRN Moderate to Severe Pain (4-10)  oxyCODONE    IR 5 milliGRAM(s) Oral once PRN Moderate to Severe Pain (4-10)  simethicone 80 milliGRAM(s) Chew every 4 hours PRN Gas      LABS:  Blood type: AB Positive  Rubella IgG: RPR: Negative                          10.0<L>   17.05<H> >-----------< 370    (  @ 06:55 )             31.4<L>                        11.1<L>   16.69<H> >-----------< 356    ( 11-10 @ 16:25 )             34.9    23 @ 06:34      139  |  105  |  16  ----------------------------<  72  4.4   |  23  |  0.90    23 @ 10:50      140  |  108<H>  |  16  ----------------------------<  87  4.5   |  22  |  1.00    23 @ 06:55      132<L>  |  102  |  9   ----------------------------<  96  4.3   |  19<L>  |  0.95    11-10-23 @ 16:25      134<L>  |  105  |  8   ----------------------------<  135<H>  4.2   |  16<L>  |  0.80        Ca    8.9      2023 06:34  Ca    8.4      2023 10:50  Ca    7.4<L>      2023 06:55  Ca    8.1<L>      10 Nov 2023 16:25  Phos  4.8<H>     11-12  Mg     3.30<H>     11-12  Mg     6.60<H>     11-11  Mg     7.40<HH>     11-10  Mg     6.40<H>     11-10  Mg     4.80<H>     -10    TPro  6.3  /  Alb  2.7<L>  /  TBili  0.2  /  DBili  x   /  AST  25  /  ALT  11  /  AlkPhos  190<H>  23 @ 06:55  TPro  6.1  /  Alb  2.6<L>  /  TBili  0.3  /  DBili  x   /  AST  21  /  ALT  10  /  AlkPhos  210<H>  11-10-23 @ 16:25          Physical exam:  Gen: NAD  Abdomen: Soft, nontender, no distension , firm uterine fundus at umbilicus.  Incision: Clean, dry, and intact   Pelvic: Normal lochia noted  Ext: No calf tenderness          
OB Progress Note:  Delivery, POD#1    S: 42yo POD#1 s/p LTCS c/b sPEC. Her pain is well controlled. She is tolerating a regular diet and passing flatus. Denies N/V. Denies CP/SOB/lightheadedness/dizziness.   She is ambulating without difficulty.   Voiding spontaneously.     O:   Vital Signs Last 24 Hrs  T(C): 36.7 (2023 03:00), Max: 36.8 (10 Nov 2023 23:00)  T(F): 98.1 (2023 03:00), Max: 98.2 (10 Nov 2023 23:00)  HR: 104 (2023 03:00) (77 - 127)  BP: 123/64 (2023 03:00) (109/80 - 156/91)  BP(mean): 106 (10 Nov 2023 17:30) (84 - 106)  RR: 20 (2023 03:) (17 - 27)  SpO2: 100% (2023 03:00) (91% - 100%)        Labs:  Blood type: AB Positive  Rubella IgG: RPR: Negative                          10.0<L>   17.05<H> >-----------< 370    (  @ 06:55 )             31.4<L>                        11.1<L>   16.69<H> >-----------< 356    ( 11-10 @ 16:25 )             34.9                        11.1<L>   10.34 >-----------< 364    (  @ 22:30 )             35.9                        10.0<L>   9.06 >-----------< 389    (  @ 06:40 )             31.4<L>    23 @ 06:55      132<L>  |  102  |  9   ----------------------------<  96  4.3   |  19<L>  |  0.95    11-10-23 @ 16:25      134<L>  |  105  |  8   ----------------------------<  135<H>  4.2   |  16<L>  |  0.80    23 @ 22:30      136  |  106  |  11  ----------------------------<  88  3.9   |  19<L>  |  0.89        Ca    7.4<L>      2023 06:55  Ca    8.1<L>      10 Nov 2023 16:25  Ca    9.5      2023 22:30  Mg     7.40<HH>     11-10  Mg     6.40<H>     11-10  Mg     4.80<H>     11-10    TPro  6.3  /  Alb  2.7<L>  /  TBili  0.2  /  DBili  x   /  AST  25  /  ALT  11  /  AlkPhos  190<H>  23 @ 06:55  TPro  6.1  /  Alb  2.6<L>  /  TBili  0.3  /  DBili  x   /  AST  21  /  ALT  10  /  AlkPhos  210<H>  11-10-23 @ 16:25  TPro  6.6  /  Alb  3.2<L>  /  TBili  0.4  /  DBili  x   /  AST  15  /  ALT  9   /  AlkPhos  217<H>  23 @ 22:30          PE:  General: NAD  Abdomen: Mildly distended, appropriately tender, incision c/d/i.  Extremities: No erythema, no pitting edema

## 2023-11-13 NOTE — PROGRESS NOTE ADULT - ASSESSMENT
A/P: 44yo POD#3 s/p LTCS.  Patient is stable and is doing well post-operatively.  pLTCS 2/2 Cat II tracing c/b sPEC s/p Mg.  Patient is stable and doing well post-operatively.      #sPEC   - s/p Mg  - BPs overnight 120s-140s/60-80  - Continue Pro 30XL qd  - Denies severe features  - HELLP wnl, 24UP: 609    #Postpartum   - EBL: 511  - Hct: 34.9->31.4  - Continue regular diet.  - Increase ambulation.  - Continue motrin, tylenol, oxycodone PRN for pain control.   - Discharge planning    Sasha Cuevas, PGY-1

## 2023-11-13 NOTE — DISCHARGE NOTE OB - PATIENT PORTAL LINK FT
You can access the FollowMyHealth Patient Portal offered by [facility] by registering at the following website: [email or web address]. By joining [employer]’s FollowMyHealth portal, you will also be able to view your health information using other applications (apps) compatible with our system.

## 2023-11-13 NOTE — DISCHARGE NOTE OB - MEDICATION SUMMARY - MEDICATIONS TO STOP TAKING
I will STOP taking the medications listed below when I get home from the hospital:    aspirin 81 mg oral capsule  -- 1 cap(s) by mouth once a day    HumuLIN N KwikPen 100 units/mL subcutaneous suspension  -- 30 unit(s) subcutaneous once a day (at bedtime)

## 2023-11-13 NOTE — DISCHARGE NOTE OB - MEDICATION SUMMARY - MEDICATIONS TO TAKE
I will START or STAY ON the medications listed below when I get home from the hospital:    ibuprofen 600 mg oral tablet  -- 1 tab(s) by mouth every 6 hours  -- Indication: For Pain    acetaminophen 325 mg oral tablet  -- 3 tab(s) by mouth every 6 hours  -- Indication: For Pain    NIFEdipine 90 mg oral tablet, extended release  -- 1 tab(s) by mouth once a day  -- Indication: For Blood pressure   I will START or STAY ON the medications listed below when I get home from the hospital:    ibuprofen 600 mg oral tablet  -- 1 tab(s) by mouth every 6 hours as needed for  moderate pain  -- Indication: For Pain    acetaminophen 325 mg oral tablet  -- 3 tab(s) by mouth every 6 hours as needed for  mild pain  -- Indication: For Pain    NIFEdipine 30 mg oral tablet, extended release  -- 1 tab(s) by mouth once a day Please do not take if your blood pressure is 110/60 or lower, or if your heart rate is less than 60 and call your MD  -- Indication: For preeclampsia

## 2023-11-13 NOTE — DISCHARGE NOTE OB - MATERIALS PROVIDED
Vaccinations/[facility] Leon Screening Program/Leon  Immunization Record/Breastfeeding Log/Bottle Feeding Log/Breastfeeding Mother’s Support Group Information/Guide to Postpartum Care/[facility] Hearing Screen Program/Back To Sleep Handout/Shaken Baby Prevention Handout/Breastfeeding Guide and Packet/Birth Certificate Instructions/Discharge Medication Information for Patients and Families Pocket Guide/MMR Vaccination (VIS Pub Date: 2012)/Tdap Vaccination (VIS Pub Date: 2012)

## 2023-11-13 NOTE — DISCHARGE NOTE OB - CARE PLAN
Principal Discharge DX:	 delivery delivered  Assessment and plan of treatment:	Regular diet.  Resume normal activity as tolerated. Follow up in the office in 2 weeks for an incision check and in 6 weeks for a postpartum visit.  No heavy lifting, driving, or strenuous activity for 2 weeks.  Nothing per vagina such as tampons, intercourse, douches or tub baths for 6 weeks or until you see your doctor.  Call your doctor with any signs and symptoms of infection such as fever, chills, nausea or vomiting.  Call your doctor with redness or swelling at the incision site, fluid leakage or wound separation.  Call your doctor if you're unable to tolerate food, increase in vaginal bleeding or have difficulty urinating.  Call your doctor if you have pain that is not relieved by your prescribed medications.  Notify your doctor with any other concerns.   1 Principal Discharge DX:	 delivery delivered  Assessment and plan of treatment:	Regular diet.  Resume normal activity as tolerated. Follow up in the office in 2 weeks for an incision check and in 6 weeks for a postpartum visit.  No heavy lifting, driving, or strenuous activity for 2 weeks.  Nothing per vagina such as tampons, intercourse, douches or tub baths for 6 weeks or until you see your doctor.  Call your doctor with any signs and symptoms of infection such as fever, chills, nausea or vomiting.  Call your doctor with redness or swelling at the incision site, fluid leakage or wound separation.  Call your doctor if you're unable to tolerate food, increase in vaginal bleeding or have difficulty urinating.  Call your doctor if you have pain that is not relieved by your prescribed medications.  Notify your doctor with any other concerns.  Secondary Diagnosis:	Preeclampsia complicating hypertension  Assessment and plan of treatment:	Follow-up in your OB office within 1 week for a blood pressure check. Please take your medication Procardia 30 mg XL daily as directed.  Please take your blood pressure 3x/day. Call your doctor if your blood pressure is 140 (top number) OR 90 (bottom number) or higher. Return to hospital if your blood pressure is 160 (top number)  (bottom number) or higher. Call your doctor if you experience symptoms such as headache, blurry vision, epigastric pain, or nausea/vomiting.

## 2023-11-13 NOTE — DISCHARGE NOTE OB - CARE PROVIDER_API CALL
Aileen Ricci.  Obstetrics and Gynecology  [address] [place] [address], [address]  [place] [address]  Phone: [phone]  Fax: [phone]  Follow Up Time:

## 2023-11-13 NOTE — PROGRESS NOTE ADULT - ATTENDING COMMENTS
OBGYN Attending    Pt seen and examined at bedside.  Agree with above.  Doing well POD#3.  Patient ambulating, tolerating regular diet w/o nausea/vomiting, voiding w/o difficulty, passing flatus.  Pain is controlled.    Abdomen benign and minimally tender   Incision c/d/i   Ext NT b/l    BP reviewed and range 130-140/67-89    A/P: 44yo POD#3 sp LTCS c/b severe PEC.   BP currently within goal range on Proc 30 XL.  BMP initially with slightly uptrending Cr- downtrending this AM.  Patient is stable and doing well post-operatively.    -continue Proc 30 XL   - Continue regular diet.  - Increase ambulation.  - Continue motrin, tylenol, oxycodone PRN for pain control.   - Discharge planning for today    MOOSE Loyd MD

## 2023-11-14 ENCOUNTER — NON-APPOINTMENT (OUTPATIENT)
Age: 43
End: 2023-11-14

## 2023-11-14 RX ORDER — PEN NEEDLE, DIABETIC 32GX 5/32"
32G X 4 MM NEEDLE, DISPOSABLE MISCELLANEOUS
Qty: 1 | Refills: 0 | Status: DISCONTINUED | COMMUNITY
Start: 2023-08-07 | End: 2023-11-14

## 2023-11-14 RX ORDER — URINE ACETONE TEST STRIPS
STRIP MISCELLANEOUS
Qty: 1 | Refills: 2 | Status: DISCONTINUED | COMMUNITY
Start: 2023-07-28 | End: 2023-11-14

## 2023-11-14 RX ORDER — PRENATAL VIT NO.126/IRON/FOLIC 28MG-0.8MG
28-0.8 TABLET ORAL DAILY
Refills: 0 | Status: ACTIVE | COMMUNITY
Start: 2023-11-14

## 2023-11-14 RX ORDER — ALBUTEROL SULFATE 90 UG/1
108 (90 BASE) INHALANT RESPIRATORY (INHALATION)
Qty: 1 | Refills: 1 | Status: DISCONTINUED | COMMUNITY
Start: 2023-08-01 | End: 2023-11-14

## 2023-11-14 RX ORDER — ISOPROPYL ALCOHOL 0.7 ML/ML
SWAB TOPICAL
Qty: 1 | Refills: 2 | Status: DISCONTINUED | COMMUNITY
Start: 2023-08-07 | End: 2023-11-14

## 2023-11-14 RX ORDER — LANCETS 33 GAUGE
EACH MISCELLANEOUS
Qty: 4 | Refills: 2 | Status: DISCONTINUED | COMMUNITY
Start: 2023-07-28 | End: 2023-11-14

## 2023-11-14 RX ORDER — BLOOD-GLUCOSE METER
KIT MISCELLANEOUS
Qty: 360 | Refills: 2 | Status: DISCONTINUED | COMMUNITY
Start: 2023-07-28 | End: 2023-11-14

## 2023-11-14 RX ORDER — INSULIN HUMAN 100 [IU]/ML
100 INJECTION, SUSPENSION SUBCUTANEOUS
Qty: 15 | Refills: 0 | Status: DISCONTINUED | COMMUNITY
Start: 2023-08-07 | End: 2023-11-14

## 2023-11-14 RX ORDER — ASPIRIN ENTERIC COATED TABLETS 81 MG 81 MG/1
81 TABLET, DELAYED RELEASE ORAL DAILY
Refills: 0 | Status: DISCONTINUED | COMMUNITY
End: 2023-11-14

## 2023-11-14 RX ORDER — BLOOD-GLUCOSE METER
W/DEVICE KIT MISCELLANEOUS
Qty: 1 | Refills: 0 | Status: DISCONTINUED | COMMUNITY
Start: 2023-07-28 | End: 2023-11-14

## 2023-11-16 ENCOUNTER — APPOINTMENT (OUTPATIENT)
Dept: CARE COORDINATION | Facility: HOME HEALTH | Age: 43
End: 2023-11-16
Payer: COMMERCIAL

## 2023-11-16 DIAGNOSIS — E66.01 MORBID (SEVERE) OBESITY DUE TO EXCESS CALORIES: ICD-10-CM

## 2023-11-16 PROCEDURE — 96127 BRIEF EMOTIONAL/BEHAV ASSMT: CPT | Mod: 95

## 2023-11-16 PROCEDURE — 99350 HOME/RES VST EST HIGH MDM 60: CPT | Mod: 95

## 2023-11-16 RX ORDER — ACETAMINOPHEN 325 MG/1
325 TABLET ORAL
Refills: 0 | Status: ACTIVE | COMMUNITY
Start: 2023-11-16

## 2023-11-16 RX ORDER — IBUPROFEN 600 MG/1
600 TABLET, FILM COATED ORAL EVERY 6 HOURS
Qty: 56 | Refills: 0 | Status: ACTIVE | COMMUNITY
Start: 2023-11-16

## 2023-11-20 ENCOUNTER — APPOINTMENT (OUTPATIENT)
Dept: MATERNAL FETAL MEDICINE | Facility: CLINIC | Age: 43
End: 2023-11-20

## 2023-11-20 ENCOUNTER — APPOINTMENT (OUTPATIENT)
Dept: PULMONOLOGY | Facility: CLINIC | Age: 43
End: 2023-11-20
Payer: COMMERCIAL

## 2023-11-20 ENCOUNTER — APPOINTMENT (OUTPATIENT)
Age: 43
End: 2023-11-20

## 2023-11-20 PROCEDURE — 99212 OFFICE O/P EST SF 10 MIN: CPT | Mod: 95

## 2023-11-21 ENCOUNTER — APPOINTMENT (OUTPATIENT)
Age: 43
End: 2023-11-21
Payer: COMMERCIAL

## 2023-11-21 ENCOUNTER — NON-APPOINTMENT (OUTPATIENT)
Age: 43
End: 2023-11-21

## 2023-11-21 PROCEDURE — S9443: CPT | Mod: 95

## 2023-11-21 RX ORDER — NIFEDIPINE 90 MG/1
90 TABLET, EXTENDED RELEASE ORAL
Qty: 30 | Refills: 0 | Status: ACTIVE | COMMUNITY
Start: 2023-11-14

## 2023-11-29 ENCOUNTER — NON-APPOINTMENT (OUTPATIENT)
Age: 43
End: 2023-11-29

## 2023-12-05 ENCOUNTER — NON-APPOINTMENT (OUTPATIENT)
Age: 43
End: 2023-12-05

## 2023-12-05 ENCOUNTER — APPOINTMENT (OUTPATIENT)
Dept: CARDIOLOGY | Facility: CLINIC | Age: 43
End: 2023-12-05
Payer: COMMERCIAL

## 2023-12-05 VITALS
BODY MASS INDEX: 36.15 KG/M2 | SYSTOLIC BLOOD PRESSURE: 124 MMHG | WEIGHT: 217 LBS | HEIGHT: 65 IN | DIASTOLIC BLOOD PRESSURE: 83 MMHG | HEART RATE: 86 BPM | OXYGEN SATURATION: 99 %

## 2023-12-05 DIAGNOSIS — O14.90 UNSPECIFIED PRE-ECLAMPSIA, UNSPECIFIED TRIMESTER: ICD-10-CM

## 2023-12-05 PROCEDURE — 93000 ELECTROCARDIOGRAM COMPLETE: CPT

## 2023-12-05 PROCEDURE — 99214 OFFICE O/P EST MOD 30 MIN: CPT

## 2023-12-10 ENCOUNTER — NON-APPOINTMENT (OUTPATIENT)
Age: 43
End: 2023-12-10

## 2023-12-13 ENCOUNTER — APPOINTMENT (OUTPATIENT)
Dept: MATERNAL FETAL MEDICINE | Facility: CLINIC | Age: 43
End: 2023-12-13

## 2023-12-19 VITALS
WEIGHT: 214 LBS | BODY MASS INDEX: 37.92 KG/M2 | HEART RATE: 74 BPM | SYSTOLIC BLOOD PRESSURE: 137 MMHG | DIASTOLIC BLOOD PRESSURE: 90 MMHG | HEIGHT: 63 IN

## 2024-02-01 ENCOUNTER — APPOINTMENT (OUTPATIENT)
Dept: CARDIOLOGY | Facility: CLINIC | Age: 44
End: 2024-02-01

## 2024-02-29 ENCOUNTER — NON-APPOINTMENT (OUTPATIENT)
Age: 44
End: 2024-02-29

## 2024-02-29 DIAGNOSIS — Z86.39 PERSONAL HISTORY OF OTHER ENDOCRINE, NUTRITIONAL AND METABOLIC DISEASE: ICD-10-CM

## 2024-03-20 ENCOUNTER — NON-APPOINTMENT (OUTPATIENT)
Age: 44
End: 2024-03-20

## 2024-03-21 ENCOUNTER — APPOINTMENT (OUTPATIENT)
Dept: DERMATOLOGY | Facility: CLINIC | Age: 44
End: 2024-03-21

## 2024-04-29 ENCOUNTER — APPOINTMENT (OUTPATIENT)
Dept: OBGYN | Facility: CLINIC | Age: 44
End: 2024-04-29

## 2024-04-29 ENCOUNTER — APPOINTMENT (OUTPATIENT)
Dept: MRI IMAGING | Facility: IMAGING CENTER | Age: 44
End: 2024-04-29

## 2024-05-03 ENCOUNTER — EMERGENCY (EMERGENCY)
Facility: HOSPITAL | Age: 44
LOS: 1 days | Discharge: HOME | End: 2024-05-03
Attending: EMERGENCY MEDICINE | Admitting: EMERGENCY MEDICINE
Payer: COMMERCIAL

## 2024-05-03 VITALS
DIASTOLIC BLOOD PRESSURE: 87 MMHG | HEART RATE: 88 BPM | RESPIRATION RATE: 16 BRPM | SYSTOLIC BLOOD PRESSURE: 117 MMHG | OXYGEN SATURATION: 98 % | TEMPERATURE: 99 F

## 2024-05-03 VITALS
RESPIRATION RATE: 18 BRPM | DIASTOLIC BLOOD PRESSURE: 87 MMHG | TEMPERATURE: 98 F | SYSTOLIC BLOOD PRESSURE: 124 MMHG | HEART RATE: 98 BPM | OXYGEN SATURATION: 97 %

## 2024-05-03 DIAGNOSIS — Z98.890 OTHER SPECIFIED POSTPROCEDURAL STATES: Chronic | ICD-10-CM

## 2024-05-03 LAB
ANION GAP SERPL CALC-SCNC: 10 MMOL/L — SIGNIFICANT CHANGE UP (ref 7–14)
APPEARANCE UR: ABNORMAL
BACTERIA # UR AUTO: NEGATIVE /HPF — SIGNIFICANT CHANGE UP
BASOPHILS # BLD AUTO: 0.06 K/UL — SIGNIFICANT CHANGE UP (ref 0–0.2)
BASOPHILS NFR BLD AUTO: 0.5 % — SIGNIFICANT CHANGE UP (ref 0–2)
BILIRUB UR-MCNC: ABNORMAL
BUN SERPL-MCNC: 9 MG/DL — SIGNIFICANT CHANGE UP (ref 7–23)
CALCIUM SERPL-MCNC: 8.9 MG/DL — SIGNIFICANT CHANGE UP (ref 8.4–10.5)
CAST: 1 /LPF — SIGNIFICANT CHANGE UP (ref 0–4)
CHLORIDE SERPL-SCNC: 106 MMOL/L — SIGNIFICANT CHANGE UP (ref 98–107)
CO2 SERPL-SCNC: 26 MMOL/L — SIGNIFICANT CHANGE UP (ref 22–31)
COLOR SPEC: ABNORMAL
CREAT SERPL-MCNC: 0.65 MG/DL — SIGNIFICANT CHANGE UP (ref 0.5–1.3)
DIFF PNL FLD: ABNORMAL
EGFR: 112 ML/MIN/1.73M2 — SIGNIFICANT CHANGE UP
EOSINOPHIL # BLD AUTO: 0.22 K/UL — SIGNIFICANT CHANGE UP (ref 0–0.5)
EOSINOPHIL NFR BLD AUTO: 2 % — SIGNIFICANT CHANGE UP (ref 0–6)
GLUCOSE SERPL-MCNC: 93 MG/DL — SIGNIFICANT CHANGE UP (ref 70–99)
GLUCOSE UR QL: NEGATIVE MG/DL — SIGNIFICANT CHANGE UP
HCG SERPL-ACNC: <1 MIU/ML — SIGNIFICANT CHANGE UP
HCT VFR BLD CALC: 34.6 % — SIGNIFICANT CHANGE UP (ref 34.5–45)
HCT VFR BLD CALC: 35.4 % — SIGNIFICANT CHANGE UP (ref 34.5–45)
HGB BLD-MCNC: 11 G/DL — LOW (ref 11.5–15.5)
HGB BLD-MCNC: 11.2 G/DL — LOW (ref 11.5–15.5)
IANC: 7.17 K/UL — SIGNIFICANT CHANGE UP (ref 1.8–7.4)
IMM GRANULOCYTES NFR BLD AUTO: 0.4 % — SIGNIFICANT CHANGE UP (ref 0–0.9)
KETONES UR-MCNC: NEGATIVE MG/DL — SIGNIFICANT CHANGE UP
LEUKOCYTE ESTERASE UR-ACNC: ABNORMAL
LYMPHOCYTES # BLD AUTO: 27.7 % — SIGNIFICANT CHANGE UP (ref 13–44)
LYMPHOCYTES # BLD AUTO: 3.11 K/UL — SIGNIFICANT CHANGE UP (ref 1–3.3)
MCHC RBC-ENTMCNC: 26.5 PG — LOW (ref 27–34)
MCHC RBC-ENTMCNC: 26.6 PG — LOW (ref 27–34)
MCHC RBC-ENTMCNC: 31.6 GM/DL — LOW (ref 32–36)
MCHC RBC-ENTMCNC: 31.8 GM/DL — LOW (ref 32–36)
MCV RBC AUTO: 83.6 FL — SIGNIFICANT CHANGE UP (ref 80–100)
MCV RBC AUTO: 83.7 FL — SIGNIFICANT CHANGE UP (ref 80–100)
MONOCYTES # BLD AUTO: 0.61 K/UL — SIGNIFICANT CHANGE UP (ref 0–0.9)
MONOCYTES NFR BLD AUTO: 5.4 % — SIGNIFICANT CHANGE UP (ref 2–14)
NEUTROPHILS # BLD AUTO: 7.17 K/UL — SIGNIFICANT CHANGE UP (ref 1.8–7.4)
NEUTROPHILS NFR BLD AUTO: 64 % — SIGNIFICANT CHANGE UP (ref 43–77)
NITRITE UR-MCNC: NEGATIVE — SIGNIFICANT CHANGE UP
NRBC # BLD: 0 /100 WBCS — SIGNIFICANT CHANGE UP (ref 0–0)
NRBC # BLD: 0 /100 WBCS — SIGNIFICANT CHANGE UP (ref 0–0)
NRBC # FLD: 0 K/UL — SIGNIFICANT CHANGE UP (ref 0–0)
NRBC # FLD: 0 K/UL — SIGNIFICANT CHANGE UP (ref 0–0)
PH UR: 5 — SIGNIFICANT CHANGE UP (ref 5–8)
PLATELET # BLD AUTO: 387 K/UL — SIGNIFICANT CHANGE UP (ref 150–400)
PLATELET # BLD AUTO: 401 K/UL — HIGH (ref 150–400)
POTASSIUM SERPL-MCNC: 4 MMOL/L — SIGNIFICANT CHANGE UP (ref 3.5–5.3)
POTASSIUM SERPL-SCNC: 4 MMOL/L — SIGNIFICANT CHANGE UP (ref 3.5–5.3)
PROT UR-MCNC: 100 MG/DL
RBC # BLD: 4.14 M/UL — SIGNIFICANT CHANGE UP (ref 3.8–5.2)
RBC # BLD: 4.23 M/UL — SIGNIFICANT CHANGE UP (ref 3.8–5.2)
RBC # FLD: 14.6 % — HIGH (ref 10.3–14.5)
RBC # FLD: 14.6 % — HIGH (ref 10.3–14.5)
RBC CASTS # UR COMP ASSIST: 3182 /HPF — HIGH (ref 0–4)
REVIEW: SIGNIFICANT CHANGE UP
SODIUM SERPL-SCNC: 142 MMOL/L — SIGNIFICANT CHANGE UP (ref 135–145)
SP GR SPEC: 1.03 — SIGNIFICANT CHANGE UP (ref 1–1.03)
SQUAMOUS # UR AUTO: 2 /HPF — SIGNIFICANT CHANGE UP (ref 0–5)
UROBILINOGEN FLD QL: 1 MG/DL — SIGNIFICANT CHANGE UP (ref 0.2–1)
WBC # BLD: 11.11 K/UL — HIGH (ref 3.8–10.5)
WBC # BLD: 11.22 K/UL — HIGH (ref 3.8–10.5)
WBC # FLD AUTO: 11.11 K/UL — HIGH (ref 3.8–10.5)
WBC # FLD AUTO: 11.22 K/UL — HIGH (ref 3.8–10.5)
WBC UR QL: 63 /HPF — HIGH (ref 0–5)

## 2024-05-03 PROCEDURE — 99284 EMERGENCY DEPT VISIT MOD MDM: CPT

## 2024-05-03 PROCEDURE — 76830 TRANSVAGINAL US NON-OB: CPT | Mod: 26

## 2024-05-03 NOTE — ED PROVIDER NOTE - NSFOLLOWUPINSTRUCTIONS_ED_ALL_ED_FT
Please return to the ED or seek medical attention immediately if you experience dizziness, fainting, chest pain, difficulty breathing, severe abdominal pain, fever, or have any other concerns. Please follow-up with your Ob/Gyn doctor in clinic.

## 2024-05-03 NOTE — ED PROVIDER NOTE - PATIENT PORTAL LINK FT
You can access the FollowMyHealth Patient Portal offered by [facility] by registering at the following website: [email or web address]. By joining [employer]’s FollowMyHealth portal, you will also be able to view your health information using other applications (apps) compatible with our system.

## 2024-05-03 NOTE — ED ADULT TRIAGE NOTE - CHIEF COMPLAINT QUOTE
c/o heavy vaginal bleeding x 3 days, states this is first menstrual cycle since giving birth last year, also c/o pelvic pain, RR even and unlabored

## 2024-05-03 NOTE — ED ADULT NURSE NOTE - NS ED NURSE RECORD ANOTHER VITAL SIGN
Message from [employer]:   From: RHEA MOORE   Sent: Sat Nov 4, 2017 10:41 AM   To: [facility] Castre[facility] Prac Nurse Msg Pool  Subject: Medication Renewal Request  Rhea Moore would like a refill of the following medications:     Other - Omeprazole 20    Preferred pharmacy: [facility] [place], [place]  Delivery method: Pickup        
Yes

## 2024-05-03 NOTE — ED ADULT NURSE NOTE - OBJECTIVE STATEMENT
Patient received to intake room 4 A&Ox4, ambulatory coming to the ED for c/o heavy vaginal bleeding x 3 days. Patient states this is first menstrual cycle since giving birth 6 months ago. Patient endorsing lower quadrant abdominal pain. States "to be saturating a maternity pad every hour or so, on the first day was saturating a pad every 15 mins." RR equal and unlabored. Abdomen soft, non-tender, non-distended. denies chest pain, sob, fevers, chills, N/V/D.  20G IV placed to the L AC, labs drawn and sent. Care plan continued. Comfort measures provided. Safety maintained. Awaiting lab results and imaging.

## 2024-05-03 NOTE — ED ADULT NURSE NOTE - NS ED NOTE ABUSE SUSPICION NEGLECT YN
I /Nisreen Bain, RN BSN called Gurjit to reminder for full evaluation and obtain pervious wait list time if now candidate for active listing. Carlos was previously on the list, but his function was 'too well' so the team advised removing his name from [facility] list.    Pt aware he can eat and drink.  He has two hours to drive to the transplant center [facility]..    fyi to Kath Elliott RN    No

## 2024-05-03 NOTE — ED PROVIDER NOTE - OBJECTIVE STATEMENT
44F with h/o endometriosis, TAMMY, PCOS, HLD p/w excessive vaginal bleeding x 4 days. Pt states she has used up to 7 heavy pads per day. Pt is approximately 6 months post-partum, and only started her period again last month. She had a abnormally light flow last month, and now a much heavier flow this month. She denies dizziness, syncope, SOB, fatigue, CP, fever, chills, abd pain, dysuria/hematuria, pelvic pain or other complaints.

## 2024-05-03 NOTE — ED PROVIDER NOTE - PHYSICAL EXAMINATION
CONSTITUTIONAL: No apparent distress; non-toxic appearing  RESP: No respiratory distress, no use of accessory muscles; lungs clear  CV: Normal rate and rhythm; no appreciable murmurs; no peripheral edema  GI: Abdomen is soft and non-tender without rebound, guarding, or distension; no palpable masses  SKIN: No rashes, ulcers, or bruising noted

## 2024-05-03 NOTE — ED PROVIDER NOTE - CLINICAL SUMMARY MEDICAL DECISION MAKING FREE TEXT BOX
44F with h/o endometriosis, TAMMY, PCOS, HLD p/w excessive vaginal bleeding x 4 days. Pt states she has used up to 7 heavy pads per day. Pt is approximately 6 months post-partum, and only started her period again last month. She had a abnormally light flow last month, and now a much heavier flow this month. She denies dizziness, syncope, SOB, fatigue, CP, fever, chills, abd pain, dysuria/hematuria, pelvic pain or other complaints. On exam, pt has no abd TTP or distension. No CVA TTP.     DDX/PLAN: Pt has heavier than usual menstrual flow after recently being post-partum. She does not have symptoms of anemia at this time. I have low suspicion for STI, PID, cyst rupture or ovarian torsion in the absence of abdominal or pelvic pain. Also low suspicion for other infection. Will obtain pelvic US and basic labs including serial CBC. Dispo pending workup.

## 2024-05-03 NOTE — ED PROVIDER NOTE - PROGRESS NOTE DETAILS
Workup negative for acute process. Endometrium appears normal on pelvic US with no other findings. Likely heavy menses s/p post-partum. Pt continues to have no symptoms of anemia. Serial CBC is stable. Plan for d/c home with Ob/Gyn follow-up. Pt given return precautions.

## 2024-05-04 LAB
CULTURE RESULTS: SIGNIFICANT CHANGE UP
SPECIMEN SOURCE: SIGNIFICANT CHANGE UP

## 2024-05-28 ENCOUNTER — APPOINTMENT (OUTPATIENT)
Dept: OBGYN | Facility: CLINIC | Age: 44
End: 2024-05-28

## 2024-05-28 ENCOUNTER — APPOINTMENT (OUTPATIENT)
Dept: OBGYN | Facility: CLINIC | Age: 44
End: 2024-05-28
Payer: COMMERCIAL

## 2024-06-24 ENCOUNTER — APPOINTMENT (OUTPATIENT)
Dept: OBGYN | Facility: CLINIC | Age: 44
End: 2024-06-24
Payer: COMMERCIAL

## 2024-06-25 ENCOUNTER — APPOINTMENT (OUTPATIENT)
Dept: OBGYN | Facility: CLINIC | Age: 44
End: 2024-06-25

## 2024-06-25 ENCOUNTER — APPOINTMENT (OUTPATIENT)
Dept: OBGYN | Facility: CLINIC | Age: 44
End: 2024-06-25
Payer: COMMERCIAL

## 2024-06-25 VITALS
DIASTOLIC BLOOD PRESSURE: 90 MMHG | HEART RATE: 89 BPM | HEIGHT: 63 IN | WEIGHT: 227 LBS | SYSTOLIC BLOOD PRESSURE: 144 MMHG | BODY MASS INDEX: 40.22 KG/M2

## 2024-06-25 DIAGNOSIS — Z12.39 ENCOUNTER FOR OTHER SCREENING FOR MALIGNANT NEOPLASM OF BREAST: ICD-10-CM

## 2024-06-25 DIAGNOSIS — N60.19 DIFFUSE CYSTIC MASTOPATHY OF UNSPECIFIED BREAST: ICD-10-CM

## 2024-06-25 DIAGNOSIS — E28.2 POLYCYSTIC OVARIAN SYNDROME: ICD-10-CM

## 2024-06-25 PROCEDURE — 99386 PREV VISIT NEW AGE 40-64: CPT

## 2024-06-25 PROCEDURE — 99459 PELVIC EXAMINATION: CPT

## 2024-06-25 PROCEDURE — 99213 OFFICE O/P EST LOW 20 MIN: CPT | Mod: 25

## 2024-06-25 PROCEDURE — 76830 TRANSVAGINAL US NON-OB: CPT

## 2024-06-25 PROCEDURE — ZZZZZ: CPT

## 2024-06-25 RX ORDER — METFORMIN ER 750 MG 750 MG/1
750 TABLET ORAL
Qty: 30 | Refills: 5 | Status: ACTIVE | COMMUNITY
Start: 2024-06-25 | End: 1900-01-01

## 2024-06-25 RX ORDER — MEDROXYPROGESTERONE ACETATE 10 MG/1
10 TABLET ORAL
Qty: 30 | Refills: 0 | Status: ACTIVE | COMMUNITY
Start: 2024-06-25 | End: 1900-01-01

## 2024-06-26 LAB — HPV HIGH+LOW RISK DNA PNL CVX: NOT DETECTED

## 2024-06-29 LAB — CYTOLOGY CVX/VAG DOC THIN PREP: NORMAL

## 2024-08-05 ENCOUNTER — NON-APPOINTMENT (OUTPATIENT)
Age: 44
End: 2024-08-05

## 2024-08-06 ENCOUNTER — APPOINTMENT (OUTPATIENT)
Dept: DERMATOLOGY | Facility: CLINIC | Age: 44
End: 2024-08-06

## 2024-08-06 ENCOUNTER — APPOINTMENT (OUTPATIENT)
Dept: ULTRASOUND IMAGING | Facility: IMAGING CENTER | Age: 44
End: 2024-08-06

## 2024-08-06 ENCOUNTER — APPOINTMENT (OUTPATIENT)
Dept: MAMMOGRAPHY | Facility: IMAGING CENTER | Age: 44
End: 2024-08-06

## 2024-08-06 PROBLEM — L21.9 SEBORRHEIC DERMATITIS: Status: ACTIVE | Noted: 2024-08-06

## 2024-08-06 PROBLEM — L73.2 HIDRADENITIS SUPPURATIVA: Status: ACTIVE | Noted: 2024-08-06

## 2024-08-06 PROBLEM — L71.9 ACNE ROSACEA: Status: ACTIVE | Noted: 2024-08-06

## 2024-08-06 PROBLEM — D48.5 NEOPLASM OF UNCERTAIN BEHAVIOR OF SKIN: Status: ACTIVE | Noted: 2024-08-06

## 2024-08-06 PROCEDURE — 99204 OFFICE O/P NEW MOD 45 MIN: CPT | Mod: 25

## 2024-08-06 PROCEDURE — 11102 TANGNTL BX SKIN SINGLE LES: CPT

## 2024-08-06 NOTE — HISTORY OF PRESENT ILLNESS
[FreeTextEntry1] : NPV - rosacea [de-identified] : 45 y/o F here for several concerns #rosacea, flaring after pregnancy 8 months ago gave birth, did IVF - no bumps, mostly red, flares with heat and sun #skin tag - 1 large one on neck, gets caught on clothing and painful feeling like it will fall off #itch in eyebrows, nasolabial folds #also gets bumps in axilla, groin

## 2024-08-06 NOTE — PHYSICAL EXAM
[Declined] : declined [FreeTextEntry3] : exophytic hyperpigmented papule on R neck mild erythema of bilateral cheeks hypopigmented patches of eyebrows with mild scale in bilateral NLF and scalp rare inflamed papules on bilateral axilla

## 2024-09-03 ENCOUNTER — APPOINTMENT (OUTPATIENT)
Dept: OBGYN | Facility: CLINIC | Age: 44
End: 2024-09-03

## 2024-09-13 ENCOUNTER — RX RENEWAL (OUTPATIENT)
Age: 44
End: 2024-09-13

## 2024-09-19 ENCOUNTER — NON-APPOINTMENT (OUTPATIENT)
Age: 44
End: 2024-09-19

## 2024-09-22 ENCOUNTER — NON-APPOINTMENT (OUTPATIENT)
Age: 44
End: 2024-09-22

## 2024-09-25 ENCOUNTER — APPOINTMENT (OUTPATIENT)
Dept: OBGYN | Facility: CLINIC | Age: 44
End: 2024-09-25

## 2024-09-26 NOTE — OB PROVIDER TRIAGE NOTE - PLAN OF CARE
NST reactive BPP 8/8 Continue Regimen: Recommend continuing Otezla at a decrease dose of 30mg daily and Vtama QD due to Otezla affecting kidneys. We will contact the  patients PCP regarding treatment plan and gather more information on renal issue. Render In Strict Bullet Format?: No Plan: Patient does not like injectables and prefers to continue Otezla treatment. Detail Level: Zone

## 2024-10-01 ENCOUNTER — APPOINTMENT (OUTPATIENT)
Dept: OBGYN | Facility: CLINIC | Age: 44
End: 2024-10-01

## 2024-10-01 VITALS
SYSTOLIC BLOOD PRESSURE: 142 MMHG | HEIGHT: 63 IN | DIASTOLIC BLOOD PRESSURE: 84 MMHG | BODY MASS INDEX: 40.04 KG/M2 | HEART RATE: 78 BPM | WEIGHT: 226 LBS

## 2024-10-01 LAB
HCG UR QL: NEGATIVE
QUALITY CONTROL: YES

## 2024-10-01 PROCEDURE — 99214 OFFICE O/P EST MOD 30 MIN: CPT

## 2024-10-01 PROCEDURE — 99459 PELVIC EXAMINATION: CPT

## 2024-10-01 PROCEDURE — 81025 URINE PREGNANCY TEST: CPT

## 2024-10-01 NOTE — PLAN
[FreeTextEntry1] : Discussed extensively with patient  Hysteroscopy D&C with Mirena IUD placement to help with PCOS symptoms Reviewed over the course of the visit 10-15 minutes of face-to-face time. -reviewed with patient answered all questions and concerns RTO 2 weeks after procedure for post-op visit

## 2024-10-01 NOTE — ED ADULT TRIAGE NOTE - RESPIRATORY RATE (BREATHS/MIN)
Anesthesia Pre Eval Note    Anesthesia ROS/Med Hx    Overall Review:  EKG was reviewed and Echo was reviewed       Pulmonary Review:    Positive for sleep apnea   Positive for COPD   The patient is a former smoker.     Neuro/Psych Review:    Positive for seizures   Positive for CVA  Positive for psychiatric history - Depression and Anxiety    Cardiovascular Review:   Comments: 9/16/15    EKG: Normal sinus rhythm   Left atrial enlargement   Right bundle branch block   When compared with ECG of   27-JUL-2014 09:34,     Exercise tolerance: good (>4 METS)  Positive for valvular problems/murmurs - murmur type MVP  Positive for PVD  Positive for hypertension    GI/HEPATIC/RENAL Review:   Comments: GLP1  Negative for nausea   Positive for renal disease    End/Other Review:  Positive for diabetes  Negative for bleeding disorder  Additional Results:  EKG:  No results found. However, due to the size of the patient record, not all encounters were searched. Please check Results Review for a complete set of results. Echo:  Ejection Fraction       Date                     Value               Ref Range           Status                09/11/2024               61                  %                   Final            ----------   Past Medical History:  7/27/2014: Calculus of kidney      Comment:  Right-sided  8/2011: Carotid artery stenosis      Comment:  70%  12/7/2015: Complex partial seizures with consciousness impaired  (CMD)  2/28/2012: COPD, V1 77% DL 65%  No date: Depressive disorder  No date: Hyperlipidemia  No date: Hypertension  No date: Mitral Valve Prolapse      Comment:  Mitral regurgitation  No date: TRAN (obstructive sleep apnea)      Comment:  uses CPAP  No date: Protein calorie malnutrition  (CMD)  7/2010: PVD (peripheral vascular disease) (CMD)      Comment:  PTA and stent of Right common femoral and righ common                iliac  9/16/2015: S/P mitral valve replacement with bioprosthetic valve  No date: Sleep  apnea, obstructive      Comment:  Uses CPAP  No date: Stroke  (CMD)  No date: Type II or unspecified type diabetes mellitus without   mention of complication, not stated as uncontrolled      Comment:  DM 2  Past Surgical History:  7/24/2015: Cardiac catherization      Comment:  No CAD.   Severe MR. Dilated LA.    EF 70%.  LVEDP  12                mmHg  9/23/2005: Colonoscopy diagnostic      Comment:  External hemorrhoids, diverticulosis, repeat in 8-10                years (Dr. Michael)  02/10/2017: Colonoscopy diagnostic      Comment:  Diverticulosis. Colon in 10 years. Dr. 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  6/22/2006: Shoulder surg proc unlisted      Comment:  Right shoulder (Dr. Mcmahon)      Relevant Problems   Anesthesia Problems   (+) TRAN , AHI 25, supine 72 CPAP 9 Quattro Fx large FFM       Physical Exam     Airway   Mallampati: II  TM Distance: >3 FB  Neck ROM: Full    Cardiovascular  Cardiovascular exam normal    Dental Exam    Patient has:  Edentulous    Pulmonary Exam  Pulmonary exam normal      Anesthesia Plan:    ASA Status: 3  Anesthesia Type: MAC    Checklist  Reviewed: Allergies, Medications, Problem list, NPO Status and Past Med History  Consent/Risks Discussed Statement:  The proposed anesthetic plan, including its risks and benefits, have been discussed with the Patient along with the risks and benefits of alternatives. Questions were encouraged and answered and the patient and/or representative understands and agrees to proceed.        I discussed with the patient (and/or patient's legal representative) the risks and benefits of the proposed anesthesia plan, MAC,  which may include services performed by other anesthesia providers.    Alternative anesthesia plans, if available, were reviewed with the patient (and/or patient's legal representative). Discussion has been held with the patient (and/or patient's legal representative) regarding risks of anesthesia, which include allergic reaction, anxiety, conversion to general anesthesia, dental injury, eye injury, intra-operative awareness, nausea, vomiting, nerve injury, organ damage and depressed breathing and emergent situations that may require change in anesthesia plan.    The patient (and/or patient's legal representative) has indicated understanding, his/her questions have been answered, and he/she wishes to proceed with the planned anesthetic.    Informed Consent for Blood: Consented  Blood Products: Not Anticipated    Comments  Plan Comments:       Risks and benefits of anesthesia reviewed including but not limited to: awareness, oral/dental injury, nausea, sore throat, corneal abrasion, cardiac and respiratory complications, allergic reactions, stroke or neurologic injury, and death. Questions sought and answered.     MD Zach  Anesthesiology       17

## 2024-10-01 NOTE — PROCEDURE
[Pre-op Evaluation] : Pre-op evaluation [Risks] : risks [Benefits] : benefits [Alternatives] : alternatives [Patient] : patient [Infection] : infection [Bleeding] : bleeding [Allergic Reaction] : allergic reaction [Uterine Perforation] : uterine perforation [Pain] : pain

## 2024-10-07 ENCOUNTER — OUTPATIENT (OUTPATIENT)
Dept: OUTPATIENT SERVICES | Facility: HOSPITAL | Age: 44
LOS: 1 days | End: 2024-10-07

## 2024-10-07 VITALS
HEIGHT: 63 IN | WEIGHT: 229.06 LBS | RESPIRATION RATE: 16 BRPM | TEMPERATURE: 98 F | DIASTOLIC BLOOD PRESSURE: 88 MMHG | SYSTOLIC BLOOD PRESSURE: 136 MMHG | HEART RATE: 83 BPM | OXYGEN SATURATION: 97 %

## 2024-10-07 DIAGNOSIS — N84.0 POLYP OF CORPUS UTERI: ICD-10-CM

## 2024-10-07 DIAGNOSIS — Z98.891 HISTORY OF UTERINE SCAR FROM PREVIOUS SURGERY: Chronic | ICD-10-CM

## 2024-10-07 DIAGNOSIS — E28.2 POLYCYSTIC OVARIAN SYNDROME: ICD-10-CM

## 2024-10-07 DIAGNOSIS — Z98.890 OTHER SPECIFIED POSTPROCEDURAL STATES: Chronic | ICD-10-CM

## 2024-10-07 LAB
HCT VFR BLD CALC: 34.1 % — LOW (ref 34.5–45)
HGB BLD-MCNC: 10.1 G/DL — LOW (ref 11.5–15.5)
MCHC RBC-ENTMCNC: 22.9 PG — LOW (ref 27–34)
MCHC RBC-ENTMCNC: 29.6 GM/DL — LOW (ref 32–36)
MCV RBC AUTO: 77.1 FL — LOW (ref 80–100)
NRBC # BLD: 0 /100 WBCS — SIGNIFICANT CHANGE UP (ref 0–0)
NRBC # FLD: 0 K/UL — SIGNIFICANT CHANGE UP (ref 0–0)
PLATELET # BLD AUTO: 437 K/UL — HIGH (ref 150–400)
RBC # BLD: 4.42 M/UL — SIGNIFICANT CHANGE UP (ref 3.8–5.2)
RBC # FLD: 18.8 % — HIGH (ref 10.3–14.5)
WBC # BLD: 11.66 K/UL — HIGH (ref 3.8–10.5)
WBC # FLD AUTO: 11.66 K/UL — HIGH (ref 3.8–10.5)

## 2024-10-07 RX ORDER — SODIUM CHLORIDE IRRIG SOLUTION 0.9 %
1000 SOLUTION, IRRIGATION IRRIGATION
Refills: 0 | Status: DISCONTINUED | OUTPATIENT
Start: 2024-10-14 | End: 2024-10-29

## 2024-10-07 NOTE — H&P PST ADULT - RESPIRATORY AND THORAX COMMENTS
"I had sleep apnea during pregnancy only", denies repeat sleep study after pregnancy, does not use CPAP

## 2024-10-07 NOTE — H&P PST ADULT - PROBLEM SELECTOR PLAN 1
pt scheduled for hysteroscopy dilation curettage with myosure, insertion of mirena intra uterine device on 10/14/24  Preop instructions provided. Pt verbalized understanding.   c/o heavy menstrual bleeding with presence of blood clots, CBC done @[facility] pt scheduled for hysteroscopy dilation curettage with myosure, insertion of mirena intra uterine device on 10/14/24  Preop instructions provided. Pt verbalized understanding.   Pepcid for GI prophylaxis with written and verbal instruction provided   c/o heavy menstrual bleeding with presence of blood clots, CBC done @[facility] pt scheduled for hysteroscopy dilation curettage with myosure, insertion of mirena intra uterine device on 10/14/24  Preop instructions provided. Pt verbalized understanding.   Pepcid for GI prophylaxis with written and verbal instruction provided   c/o heavy menstrual bleeding with presence of blood clots, CBC done @[facility]    h/o TAMMY, noncompliant with CPAP, TAMMY precautions    BMI 40.6, case discussed with Ole Birch to proceed in [facility] as per Dr. Cochran

## 2024-10-07 NOTE — H&P PST ADULT - PROBLEM SELECTOR PLAN 2
Nystatin cream placed on chart. on metformin, Patient instructed to hold Metformin on the morning of procedure. Pt stated understanding.

## 2024-10-07 NOTE — H&P PST ADULT - NSICDXPASTMEDICALHX_GEN_ALL_CORE_FT
PAST MEDICAL HISTORY:  Amenorrhea     History of endometriosis     Hyperlipidemia     PCOS (polycystic ovarian syndrome)     Pelvic and perineal pain     Polyp of corpus uteri     Sleep apnea

## 2024-10-07 NOTE — H&P PST ADULT - ENDOCRINE COMMENTS
h/o PCOS, on metformin, states " I had borderline sugar during pregnancy last year, but I don't' have DM"

## 2024-10-07 NOTE — H&P PST ADULT - ATTENDING COMMENTS
44 y.o. para1 with history of  HLD, PCOS, endometriosis, s/p laparoscopic surgery in 202 and  section.; , LMP 10/04/24, with abnormal uteirne bleeding -heavy bleeding with menses. Menses present with  clots lasting 7 days since 2023  worsened s/p delivery. She was seen in the ER 2024 due to heavy vaginal bleeding and sonogram showed endometrial polyp. After all discussions, she opted for  hysteroscopy dilation curettage with myosure, insertion of mirena intra uterine device. All questions and concerns were answered and addressed. She verbalized understanding and she signed informed consent.

## 2024-10-07 NOTE — H&P PST ADULT - HISTORY OF PRESENT ILLNESS
44 y.o. female h/o HLD, PCOS, endometriosis, s/p laparoscopic surgery in ; , LMP LMP 10/04/24, h/o HLD, PCOS, c/o menorrhagia with presents of clots lasting 7 days, since 2023 "after i had my baby", s/p ER visit in 2024 due to heavy vaginal bleeding, s/p transvaginal ultrasound, preop diagnosis polyp of corpus uteri, denies spotting between periods or pelvic pain 44 y.o. female h/o HLD, PCOS, endometriosis, s/p laparoscopic surgery in ; , LMP 10/04/24, c/o menorrhagia with presents of clots lasting 7 days since 2023 "after i had my baby", s/p ER visit in 2024 due to heavy vaginal bleeding, s/p transvaginal ultrasound, preop diagnosis polyp of corpus uteri, denies spotting between periods or pelvic pain, scheduled for hysteroscopy dilation curettage with myosure, insertion of mirena intra uterine device

## 2024-10-08 NOTE — PHYSICAL EXAM
[Chaperone Present] : A chaperone was present in the examining room during all aspects of the physical examination [Appropriately responsive] : appropriately responsive [Alert] : alert [No Acute Distress] : no acute distress [Oriented x3] : oriented x3 [FreeTextEntry2] : TIGRE Lunsford

## 2024-10-08 NOTE — DISCUSSION/SUMMARY
[FreeTextEntry1] : Discussed extensively with patient  Hysteroscopy D&C with Mirena IUD placement to help with PCOS symptoms  -reviewed with patient answered all questions and concerns RTO 2 weeks after procedure for post-op visit  I, Lucas olson acting as scribe for Dr. Ricci. 10/01/2024

## 2024-10-08 NOTE — HISTORY OF PRESENT ILLNESS
[Patient reported PAP Smear was normal] : Patient reported PAP Smear was normal [N] : Patient does not use contraception [Y] : Patient is sexually active [No] : Patient does not have concerns regarding sex [Currently Active] : currently active [Men] : men [PapSmeardate] : 6/25/24 [LMPDate] : 8/2024 [FreeTextEntry1] : 8/2024

## 2024-10-14 ENCOUNTER — RESULT REVIEW (OUTPATIENT)
Age: 44
End: 2024-10-14

## 2024-10-14 ENCOUNTER — APPOINTMENT (OUTPATIENT)
Dept: OBGYN | Facility: HOSPITAL | Age: 44
End: 2024-10-14

## 2024-10-14 ENCOUNTER — TRANSCRIPTION ENCOUNTER (OUTPATIENT)
Age: 44
End: 2024-10-14

## 2024-10-14 ENCOUNTER — OUTPATIENT (OUTPATIENT)
Dept: OUTPATIENT SERVICES | Facility: HOSPITAL | Age: 44
LOS: 1 days | Discharge: HOME | End: 2024-10-14
Payer: COMMERCIAL

## 2024-10-14 VITALS
HEART RATE: 88 BPM | SYSTOLIC BLOOD PRESSURE: 115 MMHG | RESPIRATION RATE: 16 BRPM | OXYGEN SATURATION: 97 % | WEIGHT: 229.06 LBS | DIASTOLIC BLOOD PRESSURE: 80 MMHG | TEMPERATURE: 98 F | HEIGHT: 63 IN

## 2024-10-14 VITALS
RESPIRATION RATE: 14 BRPM | HEART RATE: 80 BPM | TEMPERATURE: 98 F | SYSTOLIC BLOOD PRESSURE: 148 MMHG | OXYGEN SATURATION: 95 % | DIASTOLIC BLOOD PRESSURE: 90 MMHG

## 2024-10-14 DIAGNOSIS — Z98.891 HISTORY OF UTERINE SCAR FROM PREVIOUS SURGERY: Chronic | ICD-10-CM

## 2024-10-14 DIAGNOSIS — Z98.890 OTHER SPECIFIED POSTPROCEDURAL STATES: Chronic | ICD-10-CM

## 2024-10-14 DIAGNOSIS — N84.0 POLYP OF CORPUS UTERI: ICD-10-CM

## 2024-10-14 LAB — HCG UR QL: NEGATIVE — SIGNIFICANT CHANGE UP

## 2024-10-14 PROCEDURE — 58300 INSERT INTRAUTERINE DEVICE: CPT

## 2024-10-14 PROCEDURE — 58558 HYSTEROSCOPY BIOPSY: CPT

## 2024-10-14 PROCEDURE — 99234 HOSP IP/OBS SM DT SF/LOW 45: CPT | Mod: 25

## 2024-10-14 PROCEDURE — 88305 TISSUE EXAM BY PATHOLOGIST: CPT | Mod: 26

## 2024-10-14 DEVICE — MYOSURE TISSUE REMOVAL DEVICE REACH: Type: IMPLANTABLE DEVICE | Status: FUNCTIONAL

## 2024-10-14 DEVICE — IUD MIRENA: Type: IMPLANTABLE DEVICE | Status: FUNCTIONAL

## 2024-10-14 RX ORDER — ACETAMINOPHEN 325 MG
3 TABLET ORAL
Qty: 0 | Refills: 0 | DISCHARGE

## 2024-10-14 RX ORDER — METFORMIN HCL 500 MG
1 TABLET ORAL
Refills: 0 | DISCHARGE

## 2024-10-14 RX ORDER — ATORVASTATIN CALCIUM 10 MG/1
1 TABLET, FILM COATED ORAL
Refills: 0 | DISCHARGE

## 2024-10-14 NOTE — BRIEF OPERATIVE NOTE - NSICDXBRIEFPROCEDURE_GEN_ALL_CORE_FT
PROCEDURES:  Hysteroscopy 14-Oct-2024 17:13:41  Anshu Momin  Uterine polypectomy 14-Oct-2024 17:13:48  Anshu Momin  Dilation and curettage, uterus 14-Oct-2024 17:14:01  Anshu Momin  Insertion of Mirena IUD 14-Oct-2024 17:14:07  Anshu Momin

## 2024-10-14 NOTE — ASU DISCHARGE PLAN (ADULT/PEDIATRIC) - ASU DC SPECIAL INSTRUCTIONSFT
After your surgery it is normal to experience:    •	Vaginal bleeding- can last 1-2 weeks and should not be heavier than a period. It may come and go and be red, brown or pink. Use pads, not tampons.  •	Cramping- Is common especially within the first 24 hours. This should be relieved by taking over the counter motrin/advil or Tylenol. Alternate Tylenol and Motrin every 3 hours for pain control  •	Watery discharge- can be seen for a day or two after hysteroscopy because some of the fluid that is put into the uterus during surgery may continue to leak out for a day or two.  •	Vaginal soreness/irritation- can occur in the first few days after surgery because of the instruments that were used in the vagina. Soreness can be treated with ice pack and irritation can be taken care of with an emollient such as balmex or aquaphor that you can put directly on the irritated area.    Restrictions: For 10-14 days after the surgery you should avoid the following:    •	Tampons  •	Sex  •	Vigorous gym exercise  •	Swimming  pools and tub baths  •	Wait a day or two before going back to work    Anesthesia Precautions:  For the next 12 hours do not:   •	drive a car,  •	 operate power tools or machinery,  •	drink alcohol, beer, or wine,   •	make important personal or business decisions    Diet:   •	Resume Regular diet but Progress diet slowly     Physician Notification- Warning signs to look out for  •	Heavy Vaginal Bleeding   •	Shortness of breath or chest pain  •	Severe Abdominal Pain  •	Persistent nausea and vomiting  •	Pain not relieved by medications  •	Fever greater than 100.4®F  •	Inability to tolerate liquids or foods  •	Unable to urinate after 8 hours 2019 07:48

## 2024-10-14 NOTE — BRIEF OPERATIVE NOTE - OPERATION/FINDINGS
EUA revealed anteverted 6 week uterus. Adnexa not palpated. Cervix not dilated on exam. External genitalia grossly normal. Hysteroscopy revealed multiple intrauterine polyps. Ostia normal bilaterally.

## 2024-10-14 NOTE — ASU DISCHARGE PLAN (ADULT/PEDIATRIC) - NURSING INSTRUCTIONS
You have received Tyelneol/Acetaminophen during your hospital stay. The max amount of Tylenol daily is 4000MG. Please keep that in mind if you are taking other prescription or over the Counter pain medications or cold Medicine. The next time that you can take tylenol is at 10:45 PM__. You can then start taking it every 6 hours as  needed for pain.  You were given intravenous TORADOL for pain management at _____4:45 Pm_________. Please DO NOT take ibuprofen containing products such as MOTRIN or ADVIL, or any other NSAIDs (Non-Steroidal Anti-Inflammatory Drugs) such as ALEVE for the next 6 hours (until _____10:45 PM_________).

## 2024-10-14 NOTE — ASU DISCHARGE PLAN (ADULT/PEDIATRIC) - CARE PROVIDER_API CALL
Aileen Ricci.  Obstetrics and Gynecology  [address] [place] [address], [address]  [place] [address]  Phone: [phone]  Fax: [phone]  Established Patient  Follow Up Time: 2 weeks

## 2024-10-14 NOTE — BRIEF OPERATIVE NOTE - COMMENTS
Fluid deficit 50 ml  Mirena IUD: S/N [id number]; EXP 2026/AUG; LOT [id number]  Dictation number: [id number]

## 2024-10-17 LAB — SURGICAL PATHOLOGY STUDY: SIGNIFICANT CHANGE UP

## 2024-10-29 ENCOUNTER — APPOINTMENT (OUTPATIENT)
Dept: OBGYN | Facility: CLINIC | Age: 44
End: 2024-10-29
Payer: COMMERCIAL

## 2024-10-29 VITALS
HEART RATE: 88 BPM | HEIGHT: 63 IN | DIASTOLIC BLOOD PRESSURE: 84 MMHG | SYSTOLIC BLOOD PRESSURE: 143 MMHG | WEIGHT: 231 LBS | BODY MASS INDEX: 40.93 KG/M2

## 2024-10-29 PROCEDURE — 99212 OFFICE O/P EST SF 10 MIN: CPT

## 2024-11-08 ENCOUNTER — APPOINTMENT (OUTPATIENT)
Dept: DERMATOLOGY | Facility: CLINIC | Age: 44
End: 2024-11-08

## 2024-11-22 NOTE — ASU PATIENT PROFILE, ADULT - ACCEPTABLE
Call to make an appointment with your primary doctor regarding your elevated blood pressure.  You will have to reschedule another appointment for the liver biopsy after your blood pressure is more stable.  Make sure you take all of your blood pressure medications regularly   
7

## 2024-12-23 NOTE — H&P PST ADULT - SOURCE OF INFORMATION, PROFILE
Clean with vashe or baby shampoo and water  Apply Vashe moistened gauze and soak wound for 10 minutes  Apply silver polymem to wound bed.   Cover and secure with 4x4 gauze and ABD pad, wrap with Ace wrap from toe to knee. Change daily and as needed.     Monitor closely for s/s of infection including fever, chills, increase in pain, odor from wound, and increased redness from foot. Go to ER if any complications develop.   Keep leg elevated and avoid pressure on wound.   Diabetes:  Monitor glucose closely. Check fasting glucose and 2 hours after meals. HgA1C goal <7, fasting glucose , and 2 hours after meals <180  Hypertension:  Check blood pressure twice daily, goal <120/80  Diet:   Increase protein intake, avoid fried, fatty foods and foods high in simple carbs.   Vitamins:  Take vitamin C 1000 mg, zinc 50mg, vitamin d 5000 units, and a daily multivitamin. Angel is a good source of protein and nutrients to aid in wound healing.     patient

## 2025-01-30 PROBLEM — N84.0 POLYP OF CORPUS UTERI: Chronic | Status: ACTIVE | Noted: 2024-10-07

## 2025-02-22 ENCOUNTER — APPOINTMENT (OUTPATIENT)
Dept: ULTRASOUND IMAGING | Facility: IMAGING CENTER | Age: 45
End: 2025-02-22

## 2025-02-22 ENCOUNTER — APPOINTMENT (OUTPATIENT)
Dept: MAMMOGRAPHY | Facility: IMAGING CENTER | Age: 45
End: 2025-02-22

## 2025-04-07 ENCOUNTER — EMERGENCY (EMERGENCY)
Facility: HOSPITAL | Age: 45
LOS: 1 days | End: 2025-04-07
Admitting: STUDENT IN AN ORGANIZED HEALTH CARE EDUCATION/TRAINING PROGRAM
Payer: COMMERCIAL

## 2025-04-07 VITALS
HEART RATE: 86 BPM | OXYGEN SATURATION: 95 % | TEMPERATURE: 98 F | WEIGHT: 199.96 LBS | RESPIRATION RATE: 17 BRPM | DIASTOLIC BLOOD PRESSURE: 81 MMHG | SYSTOLIC BLOOD PRESSURE: 140 MMHG

## 2025-04-07 VITALS
TEMPERATURE: 98 F | SYSTOLIC BLOOD PRESSURE: 137 MMHG | RESPIRATION RATE: 19 BRPM | HEART RATE: 80 BPM | DIASTOLIC BLOOD PRESSURE: 86 MMHG | OXYGEN SATURATION: 95 %

## 2025-04-07 DIAGNOSIS — Z98.891 HISTORY OF UTERINE SCAR FROM PREVIOUS SURGERY: Chronic | ICD-10-CM

## 2025-04-07 DIAGNOSIS — Z98.890 OTHER SPECIFIED POSTPROCEDURAL STATES: Chronic | ICD-10-CM

## 2025-04-07 PROCEDURE — 99284 EMERGENCY DEPT VISIT MOD MDM: CPT

## 2025-04-07 RX ORDER — DIAZEPAM 2 MG/1
1 TABLET ORAL
Qty: 9 | Refills: 0
Start: 2025-04-07 | End: 2025-04-09

## 2025-04-07 RX ORDER — ACETAMINOPHEN 500 MG/5ML
975 LIQUID (ML) ORAL ONCE
Refills: 0 | Status: COMPLETED | OUTPATIENT
Start: 2025-04-07 | End: 2025-04-07

## 2025-04-07 RX ORDER — DIAZEPAM 2 MG/1
5 TABLET ORAL ONCE
Refills: 0 | Status: DISCONTINUED | OUTPATIENT
Start: 2025-04-07 | End: 2025-04-07

## 2025-04-07 RX ORDER — IBUPROFEN 200 MG
1 TABLET ORAL
Qty: 21 | Refills: 0
Start: 2025-04-07 | End: 2025-04-13

## 2025-04-07 RX ORDER — LIDOCAINE HYDROCHLORIDE 20 MG/ML
1 JELLY TOPICAL ONCE
Refills: 0 | Status: COMPLETED | OUTPATIENT
Start: 2025-04-07 | End: 2025-04-07

## 2025-04-07 RX ORDER — KETOROLAC TROMETHAMINE 30 MG/ML
15 INJECTION, SOLUTION INTRAMUSCULAR; INTRAVENOUS ONCE
Refills: 0 | Status: DISCONTINUED | OUTPATIENT
Start: 2025-04-07 | End: 2025-04-07

## 2025-04-07 RX ORDER — OXYCODONE HYDROCHLORIDE 30 MG/1
5 TABLET ORAL ONCE
Refills: 0 | Status: DISCONTINUED | OUTPATIENT
Start: 2025-04-07 | End: 2025-04-07

## 2025-04-07 RX ADMIN — KETOROLAC TROMETHAMINE 15 MILLIGRAM(S): 30 INJECTION, SOLUTION INTRAMUSCULAR; INTRAVENOUS at 18:40

## 2025-04-07 RX ADMIN — Medication 975 MILLIGRAM(S): at 20:46

## 2025-04-07 RX ADMIN — LIDOCAINE HYDROCHLORIDE 1 PATCH: 20 JELLY TOPICAL at 18:40

## 2025-04-07 RX ADMIN — DIAZEPAM 5 MILLIGRAM(S): 2 TABLET ORAL at 18:40

## 2025-04-07 RX ADMIN — OXYCODONE HYDROCHLORIDE 5 MILLIGRAM(S): 30 TABLET ORAL at 20:46

## 2025-04-07 NOTE — ED PROVIDER NOTE - CLINICAL SUMMARY MEDICAL DECISION MAKING FREE TEXT BOX
45yoF PMH lumbar herniated discs, p/w acute on chronic back pain over past few days. states worse when she woke up this am, no inciting incident, fall or trauma. does lift her kids up a lot. denies fevers, drug use. no b/b incontinence, saddle anesthesia, n/v/d/c. endorses some pain radiating down her b/l extremities. uses salonpas patches daily for her pain however had no relief today. pain worse w/. ambulation, however worse w/ prolonged sitting.    no c/f cord compression   will tx with toradol, valium, lido patch, reassess

## 2025-04-07 NOTE — ED PROVIDER NOTE - NSFOLLOWUPINSTRUCTIONS_ED_ALL_ED_FT
[facility]  Orthopedic Surgery  [address] [place] [address]  Phone: [phone]  Fax:   Follow Up Time:     [facility]  Orthopedic Surgery  [address], 3rd & 4th [place] [address]  Phone: [phone]  Fax:   Follow Up Time:     [facility] [place]  Orthopedics  .  [place]   Phone: [phone]  Fax:   Follow Up Time:     [facility]  Orthopedics  [address] [place] [address]  Phone: [phone]  Fax: [phone]  Follow Up Time:     [facility]  Orthopedics  [address] [place] [address]  Phone: [phone]  Fax:   Follow Up Time:     Back Pain  WHAT YOU NEED TO KNOW:  Back pain is common. It can be caused by many conditions, such as arthritis or the breakdown of spinal discs. Your risk for back pain is increased by injuries, lack of activity, or repeated bending and twisting. You may feel sore or stiff on one or both sides of your back. The pain may spread to your buttocks or thighs.  DISCHARGE INSTRUCTIONS:  Return to the emergency department if:   •You have pain, numbness, or weakness in one or both legs.  •Your pain becomes so severe that you cannot walk.  •You cannot control your urine or bowel movements.  •You have severe back pain with chest pain.  •You have severe back pain, nausea, and vomiting.  •You have severe back pain that spreads to your side or genital area.  Contact your healthcare provider if:   •You have back pain that does not get better with rest and pain medicine.  •You have a fever.  •You have pain that worsens when you are on your back or when you rest.  •You have pain that worsens when you cough or sneeze.  •You lose weight without trying.  •You have questions or concerns about your condition or care.  Medicines:   •NSAIDs help decrease swelling and pain. This medicine is available with or without a doctor's order. NSAIDs can cause stomach bleeding or kidney problems in certain people. If you take blood thinner medicine, always ask your healthcare provider if NSAIDs are safe for you. Always read the medicine label and follow directions.  •Acetaminophen decreases pain and fever. It is available without a doctor's order. Ask how much to take and how often to take it. Follow directions. Read the labels of all other medicines you are using to see if they also contain acetaminophen, or ask your doctor or pharmacist. Acetaminophen can cause liver damage if not taken correctly. Do not use more than 4 grams (4,000 milligrams) total of acetaminophen in one day.   •Muscle relaxers help decrease muscle spasms and back pain.  •Prescription pain medicine may be given. Ask your healthcare provider how to take this medicine safely. Some prescription pain medicines contain acetaminophen. Do not take other medicines that contain acetaminophen without talking to your healthcare provider. Too much acetaminophen may cause liver damage. Prescription pain medicine may cause constipation. Ask your healthcare provider how to prevent or treat constipation.   •Take your medicine as directed. Contact your healthcare provider if you think your medicine is not helping or if you have side effects. Tell him or her if you are allergic to any medicine. Keep a list of the medicines, vitamins, and herbs you take. Include the amounts, and when and why you take them. Bring the list or the pill bottles to follow-up visits. Carry your medicine list with you in case of an emergency.  How to manage your back pain:   •Apply ice on your back for 15 to 20 minutes every hour or as directed. Use an ice pack, or put crushed ice in a plastic bag. Cover it with a towel before you apply it to your skin. Ice helps prevent tissue damage and decreases pain.  •Apply heat on your back for 20 to 30 minutes every 2 hours for as many days as directed. Heat helps decrease pain and muscle spasms.  •Stay active as much as you can without causing more pain. Bed rest could make your back pain worse. Avoid heavy lifting until your pain is gone.  •Go to physical therapy as directed. A physical therapist can teach you exercises to help improve movement and strength, and to decrease pain.  Follow up with your healthcare provider in 2 weeks, or as directed: Write down your questions so you remember to ask them during your visits.    Dolor de espalda  LO QUE NECESITA SABER:  El dolor de espalda es común. Puede ser causado por patel gran cantidad de afecciones, tiago la artritis o por el deterioro de los discos de la columna vertebral. El riesgo del dolor de espalda aumenta al sufrir lesiones, por falta de actividad física, o inclinarse hacia adelante o girar de un lado a otro de forma repetitiva. Usted puede estar adolorido o sentir rigidez en david o ambos lados de la espalda. El dolor se puede propagar a kenny glúteos o muslos.  INSTRUCCIONES SOBRE EL RENE HOSPITALARIA:  Regrese a la chalino de emergencias si:  •Usted tiene dolor, entumecimiento o debilidad en patel o en ambas piernas.  •El dolor se vuelve tan intenso que lo incapacita para caminar.  •Usted no puede controlar portillo orina ni kenny deposiciones intestinales.  •Usted tiene dolor de espalda intenso con dolor en el pecho.  •Usted tiene dolor de espalda intenso, náuseas y vómito.  •Usted tiene un dolor de espalda intenso que se propaga a un costado o al área genital.  Comuníquese con portillo médico si:  •Usted tiene dolor de espalda que no mejora con el reposo, ni con el medicamento para el dolor.  •Tiene fiebre.  •Usted tiene un dolor que empeora cuando está acostado boca arriba o al descansar.  •Usted tiene dolor que empeora cuando tose o estornuda.  •Usted pierde peso sin proponérselo.  •Usted tiene preguntas o inquietudes acerca de portillo condición o cuidado.  Medicamentos:  •Los DIVINE,ayudan a disminuir la inflamación y el dolor. Nick medicamento está disponible con o sin patel receta médica. Los DIVINE pueden causar sangrado estomacal o problemas renales en ciertas personas. Si usted shahana un medicamento anticoagulante, siempre pregúntele a portillo médico si los DIVINE son seguros para usted. Siempre janay la etiqueta de nick medicamento y siga las instrucciones.  •Acetaminofénalivia el dolor y baja la fiebre. Está disponible sin receta médica. Pregunte la cantidad y la frecuencia con que debe tomarlos. Siga las indicaciones. Janay las etiquetas de todos los demás medicamentos que esté usando para saber si también contienen acetaminofén, o pregunte a portillo médico o farmacéutico. El acetaminofén puede causar daño en el hígado cuando no se shahana de forma correcta. No use más de 4 gramos (4000 miligramos) en total de acetaminofeno en un día.  •Relajantes muscularesayudan a disminuir los espasmos musculares y el dolor de espalda.  •Puede administrarsepodrían administrarse. Pregunte al médico cómo debe xiomara nick medicamento de forma mederos. Algunos medicamentos recetados para el dolor contienen acetaminofén. No tome otros medicamentos que contengan acetaminofén sin consultarlo con portillo médico. Demasiado acetaminofeno puede causar daño al hígado. Los medicamentos recetados para el dolor podrían causar estreñimiento. Pregunte a portillo médico tiago prevenir o tratar estreñimiento.  •[place]iscay kenny medicamentos tiago se le haya indicado.Consulte con portillo médico si usted megha que portillo medicamento no le está ayudando o si presenta efectos secundarios. Infórmele si es alérgico a cualquier medicamento. Mantenga patel lista actualizada de los medicamentos, las vitaminas y los productos herbales que shahana. Incluya los siguientes datos de los medicamentos: cantidad, frecuencia y motivo de administración. Traiga con usted la lista o los envases de las píldoras a kenny citas de seguimiento. Lleve la lista de los medicamentos con usted en shayla de patel emergencia.  La forma de controlar portillo dolor de espalda:  •Aplique hieloen la espalda de 15 a 20 minutos cada hora o tiago se le indique. Use patel compresa de hielo o ponga hielo triturado en patel bolsa de plástico. Cúbralo con patel toalla antes de aplicarlo sobre portillo piel. El hielo disminuye el dolor y ayuda a evitar el daño en los tejidos.  •La aplicación de caloren la espalda de 20 a 30 minutos cada 2 horas por los días que le indiquen. El calor ayuda a disminuir el dolor y los espasmos musculares.  •Manténgase activolo más que pueda sin causar más dolor. El reposo en cama puede empeorar portillo dolor de espalda. Evite levantar objetos hasta que ya no tenga dolor.  •Vaya a terapia física según indicaciones.Un fisioterapeuta puede enseñarle ejercicios que contribuyan a mejorar portillo movimiento y fuerza, y a aliviar el dolor.  Acuda en 2 semanas a portillo fernando de control con portillo médico, o según le indicaron:Anote kenny preguntas para que se acuerde de hacerlas gerson kenny visitas.

## 2025-04-07 NOTE — ED ADULT NURSE NOTE - EXTENSIONS OF SELF_ADULT
Patient called [phone]      Patient is requesting Lab orders for [facility]     He [facility] an [facility] 12[facility]19/22 [facility]  [facility]        Done   lab orders faxed  patient advised   SAM López  [facility]  12/09/22   None

## 2025-04-07 NOTE — ED PROVIDER NOTE - NSPTACCESSSVCSAPPTDETAILS_ED_ALL_ED_FT
hx of herniated discs, back pain, needs to establish care, needs another MRI, + eventual physcial therapy

## 2025-04-07 NOTE — ED PROVIDER NOTE - HIV OFFER
Go directly to [facility]'s ER.  Please have nothing to eat or drink on the way.  I am sending you for further workup of your heart given your significant shortness of breath and cardiac history.  
Previously Declined (within the last year)

## 2025-04-07 NOTE — ED ADULT NURSE NOTE - OBJECTIVE STATEMENT
Patient received in wellness, exam room 5. Patient A&Ox3 and ambulatory at baseline. Patient presents to the ED c/o acute on chronic back pain. Patient denies significant phx. Patient denies headache, dizziness, lightheadedness, nausea/vomiting, fever/chills. Patient offers no complaints at this time. Respirations even and unlabored, no signs/symptoms of acute distress; patient denies dyspnea, shortness of breath, and chest pain. Patient is stable at this time. Steady gait observed.

## 2025-04-07 NOTE — ED ADULT TRIAGE NOTE - CHIEF COMPLAINT QUOTE
pt with herniated discs, c/o acute on chronic lower back pain with numbness to the legs since Saturday. states pain is worse with weight bearing

## 2025-04-07 NOTE — ED PROVIDER NOTE - OBJECTIVE STATEMENT
45yoF PMH lumbar herniated discs, p/w acute on chronic back pain over past few days. states worse when she woke up this am, no inciting incident, fall or trauma. does lift her kids up a lot. denies fevers, drug use. no b/b incontinence, saddle anesthesia, n/v/d/c. endorses some pain radiating down her b/l extremities. uses salonpas patches daily for her pain however had no relief today. pain worse w/. ambulation, however worse w/ prolonged sitting. Instructions (Optional): The PDT was deferred today due to the patient receiving Avastin injections, would like to confirm with his ophthalmologist, it is safe to treat with PDT after injections. X Size Of Lesion In Cm (Optional): 0 Procedure To Be Performed At Next Visit: PDT Red Light Detail Level: Detailed Introduction Text (Please End With A Colon): The following procedure was deferred:

## 2025-04-07 NOTE — ED PROVIDER NOTE - PROGRESS NOTE DETAILS
pt reassessed, feeling better. remains ambulatory. offered CDU and declined as she needs to return home to her baby. return precautions discussed. will dc w/ valium and ibuprofen,.

## 2025-04-07 NOTE — ED PROVIDER NOTE - PHYSICAL EXAMINATION
CONSTITUTIONAL: Well-appearing; well-nourished; in no apparent distress;  NECK/LYMPH: Supple; non-tender;  CARD: Normal S1, S2; no murmurs, rubs, or gallops noted  RESP: Normal chest excursion with respiration; breath sounds clear and equal bilaterally; no wheezes, rhonchi, or rales noted  ABD/GI: soft, non-distended; non-tender; no palpable organomegaly, no pulsatile mass  EXT/MS: no visible deformity, no palpable stepoffs. midline ttp around L4-L5 region. moves all extremities; distal pulses are normal, no pedal edema  SKIN: Normal for age and race; warm; dry; good turgor; no apparent lesions or exudate noted  NEURO: Awake, alert, oriented x 3, no gross deficits, CN II-XII grossly intact, no motor or sensory deficit noted, 5/5 plantar/dorsiflexion b/l.   PSYCH: Normal mood; appropriate affect

## 2025-04-07 NOTE — ED PROVIDER NOTE - PATIENT PORTAL LINK FT
You can access the FollowMyHealth Patient Portal offered by [facility] by registering at the following website: [email or web address]. By joining [facility]’s FollowMyHealth portal, you will also be able to view your health information using other applications (apps) compatible with our system.

## 2025-04-09 ENCOUNTER — APPOINTMENT (OUTPATIENT)
Dept: ORTHOPEDIC SURGERY | Facility: CLINIC | Age: 45
End: 2025-04-09
Payer: COMMERCIAL

## 2025-04-09 PROCEDURE — 72100 X-RAY EXAM L-S SPINE 2/3 VWS: CPT

## 2025-04-09 PROCEDURE — 99204 OFFICE O/P NEW MOD 45 MIN: CPT

## 2025-04-09 RX ORDER — TIZANIDINE 2 MG/1
2 TABLET ORAL EVERY 6 HOURS
Qty: 56 | Refills: 1 | Status: ACTIVE | COMMUNITY
Start: 2025-04-09 | End: 1900-01-01

## 2025-04-09 RX ORDER — PREDNISONE 10 MG/1
10 TABLET ORAL
Qty: 39 | Refills: 0 | Status: ACTIVE | COMMUNITY
Start: 2025-04-09 | End: 1900-01-01

## 2025-04-15 ENCOUNTER — NON-APPOINTMENT (OUTPATIENT)
Age: 45
End: 2025-04-15

## 2025-04-15 ENCOUNTER — APPOINTMENT (OUTPATIENT)
Dept: MRI IMAGING | Facility: IMAGING CENTER | Age: 45
End: 2025-04-15

## 2025-04-16 ENCOUNTER — APPOINTMENT (OUTPATIENT)
Dept: ORTHOPEDIC SURGERY | Facility: CLINIC | Age: 45
End: 2025-04-16
Payer: COMMERCIAL

## 2025-04-16 ENCOUNTER — EMERGENCY (EMERGENCY)
Facility: HOSPITAL | Age: 45
LOS: 1 days | End: 2025-04-16
Attending: STUDENT IN AN ORGANIZED HEALTH CARE EDUCATION/TRAINING PROGRAM | Admitting: STUDENT IN AN ORGANIZED HEALTH CARE EDUCATION/TRAINING PROGRAM
Payer: COMMERCIAL

## 2025-04-16 VITALS
HEART RATE: 79 BPM | DIASTOLIC BLOOD PRESSURE: 91 MMHG | WEIGHT: 210.1 LBS | RESPIRATION RATE: 18 BRPM | HEIGHT: 64 IN | SYSTOLIC BLOOD PRESSURE: 136 MMHG | OXYGEN SATURATION: 96 % | TEMPERATURE: 98 F

## 2025-04-16 DIAGNOSIS — Z98.890 OTHER SPECIFIED POSTPROCEDURAL STATES: Chronic | ICD-10-CM

## 2025-04-16 DIAGNOSIS — Z98.891 HISTORY OF UTERINE SCAR FROM PREVIOUS SURGERY: Chronic | ICD-10-CM

## 2025-04-16 PROBLEM — M54.9 ACUTE BACK PAIN, UNSPECIFIED BACK LOCATION, UNSPECIFIED BACK PAIN LATERALITY: Status: ACTIVE | Noted: 2025-04-08

## 2025-04-16 LAB
ALBUMIN SERPL ELPH-MCNC: 3.9 G/DL — SIGNIFICANT CHANGE UP (ref 3.3–5)
ALP SERPL-CCNC: 99 U/L — SIGNIFICANT CHANGE UP (ref 40–120)
ALT FLD-CCNC: 21 U/L — SIGNIFICANT CHANGE UP (ref 4–33)
ANION GAP SERPL CALC-SCNC: 9 MMOL/L — SIGNIFICANT CHANGE UP (ref 7–14)
APTT BLD: 32.1 SEC — SIGNIFICANT CHANGE UP (ref 24.5–35.6)
AST SERPL-CCNC: 11 U/L — SIGNIFICANT CHANGE UP (ref 4–32)
BASOPHILS # BLD AUTO: 0.08 K/UL — SIGNIFICANT CHANGE UP (ref 0–0.2)
BASOPHILS NFR BLD AUTO: 0.5 % — SIGNIFICANT CHANGE UP (ref 0–2)
BILIRUB SERPL-MCNC: 0.2 MG/DL — SIGNIFICANT CHANGE UP (ref 0.2–1.2)
BLD GP AB SCN SERPL QL: NEGATIVE — SIGNIFICANT CHANGE UP
BUN SERPL-MCNC: 14 MG/DL — SIGNIFICANT CHANGE UP (ref 7–23)
CALCIUM SERPL-MCNC: 8.9 MG/DL — SIGNIFICANT CHANGE UP (ref 8.4–10.5)
CHLORIDE SERPL-SCNC: 101 MMOL/L — SIGNIFICANT CHANGE UP (ref 98–107)
CO2 SERPL-SCNC: 27 MMOL/L — SIGNIFICANT CHANGE UP (ref 22–31)
CREAT SERPL-MCNC: 0.76 MG/DL — SIGNIFICANT CHANGE UP (ref 0.5–1.3)
CRP SERPL-MCNC: 24.1 MG/L — HIGH
EGFR: 99 ML/MIN/1.73M2 — SIGNIFICANT CHANGE UP
EGFR: 99 ML/MIN/1.73M2 — SIGNIFICANT CHANGE UP
EOSINOPHIL # BLD AUTO: 0.4 K/UL — SIGNIFICANT CHANGE UP (ref 0–0.5)
EOSINOPHIL NFR BLD AUTO: 2.7 % — SIGNIFICANT CHANGE UP (ref 0–6)
ERYTHROCYTE [SEDIMENTATION RATE] IN BLOOD: 33 MM/HR — HIGH (ref 4–25)
GLUCOSE SERPL-MCNC: 95 MG/DL — SIGNIFICANT CHANGE UP (ref 70–99)
HCG SERPL-ACNC: <1 MIU/ML — SIGNIFICANT CHANGE UP
HCT VFR BLD CALC: 40 % — SIGNIFICANT CHANGE UP (ref 34.5–45)
HGB BLD-MCNC: 12.3 G/DL — SIGNIFICANT CHANGE UP (ref 11.5–15.5)
IANC: 9.14 K/UL — HIGH (ref 1.8–7.4)
IMM GRANULOCYTES NFR BLD AUTO: 0.6 % — SIGNIFICANT CHANGE UP (ref 0–0.9)
INR BLD: 0.9 RATIO — SIGNIFICANT CHANGE UP (ref 0.85–1.16)
LYMPHOCYTES # BLD AUTO: 30.1 % — SIGNIFICANT CHANGE UP (ref 13–44)
LYMPHOCYTES # BLD AUTO: 4.47 K/UL — HIGH (ref 1–3.3)
MCHC RBC-ENTMCNC: 24.7 PG — LOW (ref 27–34)
MCHC RBC-ENTMCNC: 30.8 G/DL — LOW (ref 32–36)
MCV RBC AUTO: 80.5 FL — SIGNIFICANT CHANGE UP (ref 80–100)
MONOCYTES # BLD AUTO: 0.69 K/UL — SIGNIFICANT CHANGE UP (ref 0–0.9)
MONOCYTES NFR BLD AUTO: 4.6 % — SIGNIFICANT CHANGE UP (ref 2–14)
NEUTROPHILS # BLD AUTO: 9.14 K/UL — HIGH (ref 1.8–7.4)
NEUTROPHILS NFR BLD AUTO: 61.5 % — SIGNIFICANT CHANGE UP (ref 43–77)
NRBC # BLD AUTO: 0 K/UL — SIGNIFICANT CHANGE UP (ref 0–0)
NRBC # FLD: 0 K/UL — SIGNIFICANT CHANGE UP (ref 0–0)
NRBC BLD AUTO-RTO: 0 /100 WBCS — SIGNIFICANT CHANGE UP (ref 0–0)
PLATELET # BLD AUTO: 453 K/UL — HIGH (ref 150–400)
POTASSIUM SERPL-MCNC: 4.2 MMOL/L — SIGNIFICANT CHANGE UP (ref 3.5–5.3)
POTASSIUM SERPL-SCNC: 4.2 MMOL/L — SIGNIFICANT CHANGE UP (ref 3.5–5.3)
PROT SERPL-MCNC: 7.7 G/DL — SIGNIFICANT CHANGE UP (ref 6–8.3)
PROTHROM AB SERPL-ACNC: 10.7 SEC — SIGNIFICANT CHANGE UP (ref 9.9–13.4)
RBC # BLD: 4.97 M/UL — SIGNIFICANT CHANGE UP (ref 3.8–5.2)
RBC # FLD: 16.3 % — HIGH (ref 10.3–14.5)
RH IG SCN BLD-IMP: POSITIVE — SIGNIFICANT CHANGE UP
SODIUM SERPL-SCNC: 137 MMOL/L — SIGNIFICANT CHANGE UP (ref 135–145)
WBC # BLD: 14.87 K/UL — HIGH (ref 3.8–10.5)
WBC # FLD AUTO: 14.87 K/UL — HIGH (ref 3.8–10.5)

## 2025-04-16 PROCEDURE — 99284 EMERGENCY DEPT VISIT MOD MDM: CPT

## 2025-04-16 PROCEDURE — 99214 OFFICE O/P EST MOD 30 MIN: CPT

## 2025-04-16 RX ORDER — LIDOCAINE HYDROCHLORIDE 20 MG/ML
1 JELLY TOPICAL ONCE
Refills: 0 | Status: COMPLETED | OUTPATIENT
Start: 2025-04-16 | End: 2025-04-16

## 2025-04-16 RX ORDER — METHOCARBAMOL 500 MG/1
1500 TABLET, FILM COATED ORAL ONCE
Refills: 0 | Status: COMPLETED | OUTPATIENT
Start: 2025-04-16 | End: 2025-04-16

## 2025-04-16 RX ORDER — KETOROLAC TROMETHAMINE 30 MG/ML
15 INJECTION, SOLUTION INTRAMUSCULAR; INTRAVENOUS ONCE
Refills: 0 | Status: DISCONTINUED | OUTPATIENT
Start: 2025-04-16 | End: 2025-04-16

## 2025-04-16 RX ADMIN — KETOROLAC TROMETHAMINE 15 MILLIGRAM(S): 30 INJECTION, SOLUTION INTRAMUSCULAR; INTRAVENOUS at 21:27

## 2025-04-16 RX ADMIN — LIDOCAINE HYDROCHLORIDE 1 PATCH: 20 JELLY TOPICAL at 21:27

## 2025-04-16 RX ADMIN — METHOCARBAMOL 1500 MILLIGRAM(S): 500 TABLET, FILM COATED ORAL at 21:27

## 2025-04-17 VITALS
OXYGEN SATURATION: 100 % | SYSTOLIC BLOOD PRESSURE: 119 MMHG | DIASTOLIC BLOOD PRESSURE: 58 MMHG | RESPIRATION RATE: 18 BRPM | HEART RATE: 83 BPM | TEMPERATURE: 97 F

## 2025-04-17 PROCEDURE — 72141 MRI NECK SPINE W/O DYE: CPT | Mod: 26

## 2025-04-17 PROCEDURE — 72146 MRI CHEST SPINE W/O DYE: CPT | Mod: 26

## 2025-04-17 PROCEDURE — 72148 MRI LUMBAR SPINE W/O DYE: CPT | Mod: 26

## 2025-04-21 ENCOUNTER — APPOINTMENT (OUTPATIENT)
Dept: ORTHOPEDIC SURGERY | Facility: CLINIC | Age: 45
End: 2025-04-21
Payer: COMMERCIAL

## 2025-04-21 DIAGNOSIS — M54.9 DORSALGIA, UNSPECIFIED: ICD-10-CM

## 2025-04-21 DIAGNOSIS — M51.369: ICD-10-CM

## 2025-04-21 PROCEDURE — 99214 OFFICE O/P EST MOD 30 MIN: CPT

## 2025-04-28 ENCOUNTER — APPOINTMENT (OUTPATIENT)
Dept: OBGYN | Facility: CLINIC | Age: 45
End: 2025-04-28

## 2025-04-29 ENCOUNTER — NON-APPOINTMENT (OUTPATIENT)
Age: 45
End: 2025-04-29

## 2025-06-25 ENCOUNTER — APPOINTMENT (OUTPATIENT)
Dept: OBGYN | Facility: CLINIC | Age: 45
End: 2025-06-25
Payer: COMMERCIAL

## 2025-06-25 ENCOUNTER — ASOB RESULT (OUTPATIENT)
Age: 45
End: 2025-06-25

## 2025-06-25 PROCEDURE — 76830 TRANSVAGINAL US NON-OB: CPT

## 2025-07-02 ENCOUNTER — NON-APPOINTMENT (OUTPATIENT)
Age: 45
End: 2025-07-02

## 2025-07-02 ENCOUNTER — APPOINTMENT (OUTPATIENT)
Dept: OBGYN | Facility: CLINIC | Age: 45
End: 2025-07-02

## 2025-07-02 VITALS
SYSTOLIC BLOOD PRESSURE: 149 MMHG | DIASTOLIC BLOOD PRESSURE: 92 MMHG | WEIGHT: 231 LBS | HEART RATE: 80 BPM | BODY MASS INDEX: 40.93 KG/M2 | HEIGHT: 63 IN

## 2025-07-02 PROBLEM — Z11.51 SCREENING FOR HUMAN PAPILLOMAVIRUS (HPV): Status: ACTIVE | Noted: 2025-07-02

## 2025-07-02 PROBLEM — R32 URINARY INCONTINENCE IN FEMALE: Status: ACTIVE | Noted: 2025-07-02

## 2025-07-02 PROBLEM — R63.8 INCREASED BMI: Status: ACTIVE | Noted: 2025-07-02

## 2025-07-02 PROBLEM — Z12.4 ENCOUNTER FOR PAPANICOLAOU SMEAR FOR CERVICAL CANCER SCREENING: Status: ACTIVE | Noted: 2025-07-02

## 2025-07-02 PROCEDURE — 99459 PELVIC EXAMINATION: CPT

## 2025-07-02 PROCEDURE — 99396 PREV VISIT EST AGE 40-64: CPT

## 2025-07-02 PROCEDURE — 99214 OFFICE O/P EST MOD 30 MIN: CPT | Mod: 25

## 2025-07-07 ENCOUNTER — APPOINTMENT (OUTPATIENT)
Dept: ORTHOPEDIC SURGERY | Facility: CLINIC | Age: 45
End: 2025-07-07

## 2025-07-07 PROBLEM — Z01.411 ENCOUNTER FOR WELL WOMAN EXAM WITH ABNORMAL FINDINGS: Status: ACTIVE | Noted: 2025-07-07

## 2025-07-07 PROBLEM — Z30.431 CHECKING OF INTRAUTERINE DEVICE: Status: ACTIVE | Noted: 2025-07-07

## 2025-07-07 LAB — HPV HIGH+LOW RISK DNA PNL CVX: NOT DETECTED

## 2025-07-09 ENCOUNTER — NON-APPOINTMENT (OUTPATIENT)
Age: 45
End: 2025-07-09

## 2025-07-09 LAB — CYTOLOGY CVX/VAG DOC THIN PREP: NORMAL

## (undated) DEVICE — MYOSURE SCOPE SEAL

## (undated) DEVICE — PREP DYNA-HEX CHG 4% 4OZ BOTTLE (BACTOSHIELD)

## (undated) DEVICE — PACK D&C

## (undated) DEVICE — PREP TRAY DRY SKIN PREP SCRUB

## (undated) DEVICE — DRSG DERMABOND PRINEO 22CM

## (undated) DEVICE — FLUENT FMS PROCEDURE KIT